# Patient Record
Sex: FEMALE | Race: WHITE | NOT HISPANIC OR LATINO | Employment: OTHER | URBAN - METROPOLITAN AREA
[De-identification: names, ages, dates, MRNs, and addresses within clinical notes are randomized per-mention and may not be internally consistent; named-entity substitution may affect disease eponyms.]

---

## 2019-08-08 ENCOUNTER — OFFICE VISIT (OUTPATIENT)
Dept: FAMILY MEDICINE CLINIC | Facility: CLINIC | Age: 64
End: 2019-08-08

## 2019-08-08 VITALS
TEMPERATURE: 99 F | HEART RATE: 90 BPM | SYSTOLIC BLOOD PRESSURE: 132 MMHG | OXYGEN SATURATION: 92 % | HEIGHT: 66 IN | DIASTOLIC BLOOD PRESSURE: 82 MMHG | BODY MASS INDEX: 25.52 KG/M2 | WEIGHT: 158.8 LBS

## 2019-08-08 DIAGNOSIS — R39.9 URINARY SYMPTOM OR SIGN: ICD-10-CM

## 2019-08-08 DIAGNOSIS — F39 MOOD DISORDER (HCC): ICD-10-CM

## 2019-08-08 DIAGNOSIS — H53.9 VISUAL DISTURBANCES: Primary | ICD-10-CM

## 2019-08-08 DIAGNOSIS — N95.9 POSTMENOPAUSAL SYMPTOMS: ICD-10-CM

## 2019-08-08 DIAGNOSIS — I10 ESSENTIAL HYPERTENSION: ICD-10-CM

## 2019-08-08 DIAGNOSIS — R51.9 WORSENING HEADACHES: ICD-10-CM

## 2019-08-08 DIAGNOSIS — E78.5 HYPERLIPIDEMIA, UNSPECIFIED HYPERLIPIDEMIA TYPE: ICD-10-CM

## 2019-08-08 DIAGNOSIS — M25.561 CHRONIC PAIN OF RIGHT KNEE: ICD-10-CM

## 2019-08-08 DIAGNOSIS — G89.29 CHRONIC PAIN OF RIGHT KNEE: ICD-10-CM

## 2019-08-08 DIAGNOSIS — K21.9 GASTROESOPHAGEAL REFLUX DISEASE WITHOUT ESOPHAGITIS: ICD-10-CM

## 2019-08-08 DIAGNOSIS — G89.4 CHRONIC PAIN SYNDROME: ICD-10-CM

## 2019-08-08 PROBLEM — N39.0 UTI (URINARY TRACT INFECTION): Status: ACTIVE | Noted: 2019-08-08

## 2019-08-08 LAB
BILIRUB BLD-MCNC: NEGATIVE MG/DL
CLARITY, POC: CLEAR
COLOR UR: YELLOW
GLUCOSE UR STRIP-MCNC: NEGATIVE MG/DL
KETONES UR QL: NEGATIVE
LEUKOCYTE EST, POC: NEGATIVE
NITRITE UR-MCNC: NEGATIVE MG/ML
PH UR: 7.5 [PH] (ref 5–8)
PROT UR STRIP-MCNC: NEGATIVE MG/DL
RBC # UR STRIP: NEGATIVE /UL
SP GR UR: 1.01 (ref 1–1.03)
UROBILINOGEN UR QL: NORMAL

## 2019-08-08 PROCEDURE — 81003 URINALYSIS AUTO W/O SCOPE: CPT | Performed by: INTERNAL MEDICINE

## 2019-08-08 PROCEDURE — 99214 OFFICE O/P EST MOD 30 MIN: CPT | Performed by: INTERNAL MEDICINE

## 2019-08-08 RX ORDER — METOPROLOL TARTRATE 100 MG/1
100 TABLET ORAL 2 TIMES DAILY
Qty: 180 TABLET | Refills: 1 | Status: SHIPPED | OUTPATIENT
Start: 2019-08-08 | End: 2020-02-06 | Stop reason: SDUPTHER

## 2019-08-08 RX ORDER — HYDROCODONE BITARTRATE AND ACETAMINOPHEN 7.5; 325 MG/1; MG/1
1 TABLET ORAL EVERY 8 HOURS PRN
Qty: 90 TABLET | Refills: 0 | Status: SHIPPED | OUTPATIENT
Start: 2019-08-08 | End: 2019-09-06 | Stop reason: SDUPTHER

## 2019-08-08 RX ORDER — PAROXETINE HYDROCHLORIDE 40 MG/1
60 TABLET, FILM COATED ORAL EVERY MORNING
Qty: 45 TABLET | Refills: 3 | Status: SHIPPED | OUTPATIENT
Start: 2019-08-08 | End: 2019-11-07

## 2019-08-08 RX ORDER — PANTOPRAZOLE SODIUM 40 MG/1
40 TABLET, DELAYED RELEASE ORAL DAILY
Qty: 90 TABLET | Refills: 1 | Status: SHIPPED | OUTPATIENT
Start: 2019-08-08 | End: 2020-02-06 | Stop reason: SDUPTHER

## 2019-08-08 NOTE — PROGRESS NOTES
Subjective   Lula Decker is a 64 y.o. female.     Chief Complaint   Patient presents with   • Knee Pain     right   • Shoulder Pain   • Osteoarthritis   • Urinary Tract Infection   • Vision Disturbance   • Lung Follow-up       History of Present Illness   Having strong smelling and appearing urine for the last 2 weeks.  No fever, chills, dysuria.  Has had some night sweats that was helped with paroxetine in the past.  Has chronic right knee pain with episodes of exacerbation the most recent 2 months ago that was severe and impeded walking for a week.  Some shoulder pain and shoulder blade pain worse with deep inspiration and carrying the portable oxygen with shoulder strap.  Had evaluation with Dr Falcon and had bronchoscopy finding pseudomonas and treated with cipro.  Better but still with chronic short of breath that is oxygen requiring.  When awakens has loss of vision with white starburst in the middle bilaterally. Has had 4-6 episodes and increasing. No associated dizziness.  Having increased headaches. Saw ophthalmology with Den and no eye issues found.  No new weakness or numbness.  The following portions of the patient's history were reviewed and updated as appropriate: allergies, current medications, past family history, past medical history, past social history, past surgical history and problem list.    Past Medical History:   Diagnosis Date   • Abnormal findings on diagnostic imaging of abdomen    • Alopecia areata    • Asthma    • Bleeding external hemorrhoids    • BMI 25.0-25.9,adult    • Calf cramp    • Carpal tunnel syndrome    • Chest pain    • Chest wall pain    • Constipation    • COPD (chronic obstructive pulmonary disease) (CMS/HCC)    • COPD with acute exacerbation (CMS/HCC)    • Discoloration of skin of foot    • Dyslipidemia    • Dysuria    • Eczema    • Encounter for immunization    • Esophageal reflux    • Fatty liver    • GERD (gastroesophageal reflux disease)    • Headache     • Hives of unknown origin    • Hyperactivity of bladder    • Hypertension    • IBS (irritable bowel syndrome)    • Incontinence of urine    • Insomnia    • Menopausal symptom    • Migraine    • MRSA exposure    • Muscle spasm    • Nocturia    • OA (osteoarthritis)    • Obstructive chronic bronchitis with acute exacerbation (CMS/HCC)    • Oral thrush    • Osteoarthritis of left knee    • Osteoporosis    • PAD (peripheral artery disease) (CMS/HCC)    • Sinusitis, acute    • Synovial cyst of popliteal space    • Tremor    • Tubular adenoma of colon    • Urinary frequency    • Urinary urgency    • Urinary, incontinence, stress female    • UTI (urinary tract infection)        Past Surgical History:   Procedure Laterality Date   • BREAST LUMPECTOMY     • BREAST SURGERY     • COLONOSCOPY     • HYSTERECTOMY      Not due to cancer   • NECK SURGERY     • NEUROPLASTY      Median nerve at carpal tunnel.   • OOPHORECTOMY     • OTHER SURGICAL HISTORY  08/20/2014    Esophadogastroduodenoscopy - Gastritis    • TONSILLECTOMY     • TUBAL ABDOMINAL LIGATION         Family History   Problem Relation Age of Onset   • Heart failure Father        Social History     Socioeconomic History   • Marital status:      Spouse name: Not on file   • Number of children: Not on file   • Years of education: Not on file   • Highest education level: Not on file   Tobacco Use   • Smoking status: Current Every Day Smoker     Packs/day: 0.50     Types: Cigarettes   Substance and Sexual Activity   • Alcohol use: Yes       Review of Systems    Objective   Vitals:    08/08/19 1338   BP: 132/82   Pulse: 90   Temp: 99 °F (37.2 °C)   SpO2: 92%     Body mass index is 25.63 kg/m².  Physical Exam    No results found for this or any previous visit (from the past 2016 hour(s)).  Assessment/Plan   Lula was seen today for knee pain, shoulder pain, osteoarthritis, urinary tract infection, vision disturbance and lung follow-up.    Diagnoses and all orders for  this visit:    Visual disturbances  -     MRI Angiogram Head With & Without Contrast; Future  -     CBC & Differential  -     TSH Rfx On Abnormal To Free T4    Postmenopausal symptoms  -     PARoxetine (PAXIL) 40 MG tablet; Take 1.5 tablets by mouth Every Morning.  -     CBC & Differential  -     TSH Rfx On Abnormal To Free T4    Chronic pain of right knee  -     Ambulatory Referral to Orthopedic Surgery    Chronic pain syndrome    Gastroesophageal reflux disease without esophagitis  -     pantoprazole (PROTONIX) 40 MG EC tablet; Take 1 tablet by mouth Daily.    Essential hypertension  -     metoprolol tartrate (LOPRESSOR) 100 MG tablet; Take 1 tablet by mouth 2 (Two) Times a Day.    Worsening headaches  -     MRI Angiogram Head With & Without Contrast; Future    Hyperlipidemia, unspecified hyperlipidemia type  -     Comprehensive Metabolic Panel  -     Lipid Panel    Mood disorder (CMS/HCC)   -     TSH Rfx On Abnormal To Free T4    Other orders  -     HYDROcodone-acetaminophen (NORCO) 7.5-325 MG per tablet; Take 1 tablet by mouth Every 8 (Eight) Hours As Needed for Moderate Pain .      Controlled substance agreement reviewed and signed in office.  ROGER run and reviewed.  Risks of the medication include but are not limited to fatigue, somnolence, increased risk of falls, constipation, allergic reaction, dependence, and addiction

## 2019-08-09 LAB
ALBUMIN SERPL-MCNC: 4.3 G/DL (ref 3.6–4.8)
ALBUMIN/GLOB SERPL: 1.7 {RATIO} (ref 1.2–2.2)
ALP SERPL-CCNC: 69 IU/L (ref 39–117)
ALT SERPL-CCNC: 12 IU/L (ref 0–32)
AST SERPL-CCNC: 23 IU/L (ref 0–40)
BASOPHILS # BLD AUTO: 0 X10E3/UL (ref 0–0.2)
BASOPHILS NFR BLD AUTO: 0 %
BILIRUB SERPL-MCNC: 0.2 MG/DL (ref 0–1.2)
BUN SERPL-MCNC: 7 MG/DL (ref 8–27)
BUN/CREAT SERPL: 12 (ref 12–28)
CALCIUM SERPL-MCNC: 9.1 MG/DL (ref 8.7–10.3)
CHLORIDE SERPL-SCNC: 93 MMOL/L (ref 96–106)
CHOLEST SERPL-MCNC: 257 MG/DL (ref 100–199)
CO2 SERPL-SCNC: 31 MMOL/L (ref 20–29)
CREAT SERPL-MCNC: 0.6 MG/DL (ref 0.57–1)
EOSINOPHIL # BLD AUTO: 0.1 X10E3/UL (ref 0–0.4)
EOSINOPHIL NFR BLD AUTO: 1 %
ERYTHROCYTE [DISTWIDTH] IN BLOOD BY AUTOMATED COUNT: 13.2 % (ref 12.3–15.4)
GLOBULIN SER CALC-MCNC: 2.6 G/DL (ref 1.5–4.5)
GLUCOSE SERPL-MCNC: 77 MG/DL (ref 65–99)
HCT VFR BLD AUTO: 35.8 % (ref 34–46.6)
HDLC SERPL-MCNC: 68 MG/DL
HGB BLD-MCNC: 11.6 G/DL (ref 11.1–15.9)
IMM GRANULOCYTES # BLD AUTO: 0 X10E3/UL (ref 0–0.1)
IMM GRANULOCYTES NFR BLD AUTO: 0 %
LDLC SERPL CALC-MCNC: 158 MG/DL (ref 0–99)
LYMPHOCYTES # BLD AUTO: 3.8 X10E3/UL (ref 0.7–3.1)
LYMPHOCYTES NFR BLD AUTO: 42 %
MCH RBC QN AUTO: 30 PG (ref 26.6–33)
MCHC RBC AUTO-ENTMCNC: 32.4 G/DL (ref 31.5–35.7)
MCV RBC AUTO: 93 FL (ref 79–97)
MONOCYTES # BLD AUTO: 0.9 X10E3/UL (ref 0.1–0.9)
MONOCYTES NFR BLD AUTO: 10 %
NEUTROPHILS # BLD AUTO: 4.1 X10E3/UL (ref 1.4–7)
NEUTROPHILS NFR BLD AUTO: 47 %
PLATELET # BLD AUTO: 325 X10E3/UL (ref 150–450)
POTASSIUM SERPL-SCNC: 4.3 MMOL/L (ref 3.5–5.2)
PROT SERPL-MCNC: 6.9 G/DL (ref 6–8.5)
RBC # BLD AUTO: 3.87 X10E6/UL (ref 3.77–5.28)
SODIUM SERPL-SCNC: 139 MMOL/L (ref 134–144)
TRIGL SERPL-MCNC: 156 MG/DL (ref 0–149)
TSH SERPL DL<=0.005 MIU/L-ACNC: 1.3 UIU/ML (ref 0.45–4.5)
VLDLC SERPL CALC-MCNC: 31 MG/DL (ref 5–40)
WBC # BLD AUTO: 8.9 X10E3/UL (ref 3.4–10.8)

## 2019-08-14 LAB — DRUGS UR: NORMAL

## 2019-09-04 ENCOUNTER — OFFICE VISIT (OUTPATIENT)
Dept: ORTHOPEDIC SURGERY | Facility: CLINIC | Age: 64
End: 2019-09-04

## 2019-09-04 VITALS — WEIGHT: 158 LBS | TEMPERATURE: 97.3 F | HEIGHT: 66 IN | BODY MASS INDEX: 25.39 KG/M2

## 2019-09-04 DIAGNOSIS — M17.11 ARTHRITIS OF RIGHT KNEE: ICD-10-CM

## 2019-09-04 DIAGNOSIS — M25.561 CHRONIC PAIN OF RIGHT KNEE: Primary | ICD-10-CM

## 2019-09-04 DIAGNOSIS — G89.29 CHRONIC PAIN OF RIGHT KNEE: Primary | ICD-10-CM

## 2019-09-04 PROCEDURE — 99203 OFFICE O/P NEW LOW 30 MIN: CPT | Performed by: ORTHOPAEDIC SURGERY

## 2019-09-04 PROCEDURE — 73562 X-RAY EXAM OF KNEE 3: CPT | Performed by: ORTHOPAEDIC SURGERY

## 2019-09-04 NOTE — PATIENT INSTRUCTIONS
Knee Exercises    Ask your health care provider which exercises are safe for you. Do exercises exactly as told by your health care provider and adjust them as directed. It is normal to feel mild stretching, pulling, tightness, or discomfort as you do these exercises, but you should stop right away if you feel sudden pain or your pain gets worse. Do not begin these exercises until told by your health care provider.  STRETCHING AND RANGE OF MOTION EXERCISES  These exercises warm up your muscles and joints and improve the movement and flexibility of your knee. These exercises also help to relieve pain, numbness, and tingling.  Exercise A: Knee Extension, Prone  1. Lie on your abdomen on a bed.  2. Place your left / right knee just beyond the edge of the surface so your knee is not on the bed. You can put a towel under your left / right thigh just above your knee for comfort.  3. Relax your leg muscles and allow gravity to straighten your knee. You should feel a stretch behind your left / right knee.  4. Hold this position for __________ seconds.  5. Scoot up so your knee is supported between repetitions.  Repeat __________ times. Complete this stretch __________ times a day.  Exercise B: Knee Flexion, Active  1. Lie on your back with both knees straight. If this causes back discomfort, bend your left / right knee so your foot is flat on the floor.  2. Slowly slide your left / right heel back toward your buttocks until you feel a gentle stretch in the front of your knee or thigh.  3. Hold this position for __________ seconds.  4. Slowly slide your left / right heel back to the starting position.  Repeat __________ times. Complete this exercise __________ times a day.  Exercise C: Quadriceps, Prone  1. Lie on your abdomen on a firm surface, such as a bed or padded floor.  2. Bend your left / right knee and hold your ankle. If you cannot reach your ankle or pant leg, loop a belt around your foot and grab the belt  instead.  3. Gently pull your heel toward your buttocks. Your knee should not slide out to the side. You should feel a stretch in the front of your thigh and knee.  4. Hold this position for __________ seconds.  Repeat __________ times. Complete this stretch __________ times a day.  Exercise D: Hamstring, Supine  1. Lie on your back.  2. Loop a belt or towel over the ball of your left / right foot. The ball of your foot is on the walking surface, right under your toes.  3. Straighten your left / right knee and slowly pull on the belt to raise your leg until you feel a gentle stretch behind your knee.  ? Do not let your left / right knee bend while you do this.  ? Keep your other leg flat on the floor.  4. Hold this position for __________ seconds.  Repeat __________ times. Complete this stretch __________ times a day.  STRENGTHENING EXERCISES  These exercises build strength and endurance in your knee. Endurance is the ability to use your muscles for a long time, even after they get tired.  Exercise E: Quadriceps, Isometric  1. Lie on your back with your left / right leg extended and your other knee bent. Put a rolled towel or small pillow under your knee if told by your health care provider.  2. Slowly tense the muscles in the front of your left / right thigh. You should see your kneecap slide up toward your hip or see increased dimpling just above the knee. This motion will push the back of the knee toward the floor.  3. For __________ seconds, keep the muscle as tight as you can without increasing your pain.  4. Relax the muscles slowly and completely.  Repeat __________ times. Complete this exercise __________ times a day.  Exercise F: Straight Leg Raises - Quadriceps  1. Lie on your back with your left / right leg extended and your other knee bent.  2. Tense the muscles in the front of your left / right thigh. You should see your kneecap slide up or see increased dimpling just above the knee. Your thigh may even  shake a bit.  3. Keep these muscles tight as you raise your leg 4-6 inches (10-15 cm) off the floor. Do not let your knee bend.  4. Hold this position for __________ seconds.  5. Keep these muscles tense as you lower your leg.  6. Relax your muscles slowly and completely after each repetition.  Repeat __________ times. Complete this exercise __________ times a day.  Exercise G: Hamstring, Isometric  1. Lie on your back on a firm surface.  2. Bend your left / right knee approximately __________ degrees.  3. Dig your left / right heel into the surface as if you are trying to pull it toward your buttocks. Tighten the muscles in the back of your thighs to dig as hard as you can without increasing any pain.  4. Hold this position for __________ seconds.  5. Release the tension gradually and allow your muscles to relax completely for __________ seconds after each repetition.  Repeat __________ times. Complete this exercise __________ times a day.  Exercise H: Hamstring Curls  If told by your health care provider, do this exercise while wearing ankle weights. Begin with __________ weights. Then increase the weight by 1 lb (0.5 kg) increments. Do not wear ankle weights that are more than __________.  1. Lie on your abdomen with your legs straight.  2. Bend your left / right knee as far as you can without feeling pain. Keep your hips flat against the floor.  3. Hold this position for __________ seconds.  4. Slowly lower your leg to the starting position.  Repeat __________ times. Complete this exercise __________ times a day.  Exercise I: Squats (Quadriceps)  1.  front of a table, with your feet and knees pointing straight ahead. You may rest your hands on the table for balance but not for support.  2. Slowly bend your knees and lower your hips like you are going to sit in a chair.  ? Keep your weight over your heels, not over your toes.  ? Keep your lower legs upright so they are parallel with the table legs.  ? Do  not let your hips go lower than your knees.  ? Do not bend lower than told by your health care provider.  ? If your knee pain increases, do not bend as low.  3. Hold the squat position for __________ seconds.  4. Slowly push with your legs to return to standing. Do not use your hands to pull yourself to standing.  Repeat __________ times. Complete this exercise __________ times a day.  Exercise J: Wall Slides (Quadriceps)  1. Lean your back against a smooth wall or door while you walk your feet out 18-24 inches (46-61 cm) from it.  2. Place your feet hip-width apart.  3. Slowly slide down the wall or door until your knees bend __________ degrees. Keep your knees over your heels, not over your toes. Keep your knees in line with your hips.  4. Hold for __________ seconds.  Repeat __________ times. Complete this exercise __________ times a day.  Exercise K: Straight Leg Raises - Hip Abductors  1. Lie on your side with your left / right leg in the top position. Lie so your head, shoulder, knee, and hip line up. You may bend your bottom knee to help you keep your balance.  2. Roll your hips slightly forward so your hips are stacked directly over each other and your left / right knee is facing forward.  3. Leading with your heel, lift your top leg 4-6 inches (10-15 cm). You should feel the muscles in your outer hip lifting.  ? Do not let your foot drift forward.  ? Do not let your knee roll toward the ceiling.  4. Hold this position for __________ seconds.  5. Slowly return your leg to the starting position.  6. Let your muscles relax completely after each repetition.  Repeat __________ times. Complete this exercise __________ times a day.  Exercise L: Straight Leg Raises - Hip Extensors  1. Lie on your abdomen on a firm surface. You can put a pillow under your hips if that is more comfortable.  2. Tense the muscles in your buttocks and lift your left / right leg about 4-6 inches (10-15 cm). Keep your knee straight as you  lift your leg.  3. Hold this position for __________ seconds.  4. Slowly lower your leg to the starting position.  5. Let your leg relax completely after each repetition.  Repeat __________ times. Complete this exercise __________ times a day.  This information is not intended to replace advice given to you by your health care provider. Make sure you discuss any questions you have with your health care provider.  Document Released: 11/01/2006 Document Revised: 09/11/2017 Document Reviewed: 10/23/2016  [employer]vier Interactive Patient Education © 2019 [employer]

## 2019-09-04 NOTE — PROGRESS NOTES
"Patient Name: Lula Decker   YOB: 1955  Referring Primary Care Physician: Nghia Eaton MD  BMI: Body mass index is 25.5 kg/m².    Chief Complaint:    Chief Complaint   Patient presents with   • Right Knee - Establish Care        HPI:     Lula Decker is a 64 y.o. female who presents today for evaluation of   Chief Complaint   Patient presents with   • Right Knee - Establish Care   .  Patient is seen here today complaining of right knee pain.  She says that is been going on for a few years.  She said her last few months and last few weeks she had a severe exacerbation without trauma that was achy and painful.  It was relieved by BenGay stick on pads and rest.  He is on oxygen and has \"25% lung    This problem is new to this examiner.     Subjective   Medications:   Home Medications:  Current Outpatient Medications on File Prior to Visit   Medication Sig   • albuterol sulfate  (90 Base) MCG/ACT inhaler Inhale 2 puffs Every 4 (Four) Hours As Needed for Wheezing.   • amitriptyline (ELAVIL) 100 MG tablet Take 100 mg by mouth Every Night.   • HYDROcodone-acetaminophen (NORCO) 7.5-325 MG per tablet Take 1 tablet by mouth Every 8 (Eight) Hours As Needed for Moderate Pain .   • hydrocortisone 2.5 % cream Apply 2.5 application topically to the appropriate area as directed 3 (Three) Times a Day.   • IPRATROPIUM-ALBUTEROL IN Inhale.   • metoprolol tartrate (LOPRESSOR) 100 MG tablet Take 1 tablet by mouth 2 (Two) Times a Day.   • mometasone-formoterol (DULERA 200) 200-5 MCG/ACT inhaler Inhale 2 puffs 2 (Two) Times a Day.   • naproxen sodium (ALEVE) 220 MG tablet Take 220 mg by mouth 2 (Two) Times a Day As Needed.   • pantoprazole (PROTONIX) 40 MG EC tablet Take 1 tablet by mouth Daily.   • triamcinolone (KENALOG) 0.1 % cream Apply  topically to the appropriate area as directed 2 (Two) Times a Day.   • PARoxetine (PAXIL) 40 MG tablet Take 1.5 tablets by mouth Every Morning.   • simvastatin (ZOCOR) 40 " MG tablet Take 40 mg by mouth Every Night.     No current facility-administered medications on file prior to visit.      Current Medications:  Scheduled Meds:  Continuous Infusions:  No current facility-administered medications for this visit.   PRN Meds:.    I have reviewed the patient's medical history in detail and updated the computerized patient record.  Review and summarization of old records includes:    Past Medical History:   Diagnosis Date   • Abnormal findings on diagnostic imaging of abdomen    • Alopecia areata    • Asthma    • Bleeding external hemorrhoids    • BMI 25.0-25.9,adult    • Calf cramp    • Carpal tunnel syndrome    • Chest pain    • Chest wall pain    • Constipation    • COPD (chronic obstructive pulmonary disease) (CMS/[facility])    • COPD with acute exacerbation (CMS/[facility])    • Discoloration of skin of foot    • Dyslipidemia    • Dysuria    • Eczema    • Encounter for immunization    • Esophageal reflux    • Fatty liver    • GERD (gastroesophageal reflux disease)    • Headache    • Hives of unknown origin    • Hyperactivity of bladder    • Hypertension    • IBS (irritable bowel syndrome)    • Incontinence of urine    • Insomnia    • Menopausal symptom    • Migraine    • MRSA exposure    • Muscle spasm    • Nocturia    • OA (osteoarthritis)    • Obstructive chronic bronchitis with acute exacerbation (CMS/[facility])    • Oral thrush    • Osteoarthritis of left knee    • Osteoporosis    • PAD (peripheral artery disease) (CMS/[facility])    • Sinusitis, acute    • Synovial cyst of popliteal space    • Tremor    • Tubular adenoma of colon    • Urinary frequency    • Urinary urgency    • Urinary, incontinence, stress female    • UTI (urinary tract infection)         Past Surgical History:   Procedure Laterality Date   • BREAST LUMPECTOMY     • BREAST SURGERY     • COLONOSCOPY     • HYSTERECTOMY      Not due to cancer   • NECK SURGERY     • NEUROPLASTY      Median nerve at carpal tunnel.   • OOPHORECTOMY     •  "OTHER SURGICAL HISTORY  08/20/2014    Esophadogastroduodenoscopy - Gastritis    • TONSILLECTOMY     • TUBAL ABDOMINAL LIGATION          Social History     Occupational History   • Not on file   Tobacco Use   • Smoking status: Current Every Day Smoker     Packs/day: 0.50     Types: Cigarettes   Substance and Sexual Activity   • Alcohol use: Yes   • Drug use: Not on file   • Sexual activity: Defer      Social History     Social History Narrative   • Not on file        Family History   Problem Relation Age of Onset   • Heart failure Father        ROS: 14 point review of systems was performed and all other systems were reviewed and are negative except for documented findings in HPI and today's encounter.     Allergies: No Known Allergies  Constitutional:  Denies fever, shaking or chills   Eyes:  Denies change in visual acuity   HENT:  Denies nasal congestion or sore throat   Respiratory:  Denies cough or shortness of breath   Cardiovascular:  Denies chest pain or severe LE edema   GI:  Denies abdominal pain, nausea, vomiting, bloody stools or diarrhea   Musculoskeletal:  Numbness, tingling, pain, or loss of motor function only as noted above in history of present illness.  : Denies painful urination or hematuria  Integument:  Denies rash, lesion or ulceration   Neurologic:  Denies headache or focal weakness  Endocrine:  Denies lymphadenopathy  Psych:  Denies confusion or change in mental status   Hem:  Denies active bleeding    OBJECTIVE:  Physical Exam: 64 y.o. female  Wt Readings from Last 3 Encounters:   09/04/19 71.7 kg (158 lb)   08/08/19 72 kg (158 lb 12.8 oz)     Ht Readings from Last 1 Encounters:   09/04/19 167.6 cm (66\")     Body mass index is 25.5 kg/m².  Vitals:    09/04/19 1301   Temp: 97.3 °F (36.3 °C)     Vital signs reviewed.     General Appearance:    Alert, cooperative, in no acute distress                  Eyes: conjunctiva clear  ENT: external ears and nose atraumatic  CV: no peripheral " edema  Resp: normal respiratory effort  Skin: no rashes or wounds; normal turgor  Psych: mood and affect appropriate  Lymph: no nodes appreciated  Neuro: gross sensation intact  Vascular:  Palpable peripheral pulse in noted extremity  Musculoskeletal Extremities: Exam today shows range of motion goes about -5 to about 100 she has some effusion synovitis and diffuse joint line tenderness her calf is soft and she can walk with a slight limp    Radiology:   AP lateral 40 degree PA x-ray right knee taken the office today without comparison view show advanced arthritis best seen on the lateral    Assessment:     ICD-10-CM ICD-9-CM   1. Chronic pain of right knee M25.561 719.46    G89.29 338.29   2. Arthritis of right knee M17.11 716.96        Procedures       Plan: Biomechanics of pertinent body area discussed.  Risks, benefits, alternatives, comparisons, and complications of accepted medicines, injections, recommendations, surgical procedures, and therapies explained and education provided in laymen's terms. Natural history and expected course of this patient's diagnosis discussed along with evaluation of therapies. Questions answered. When appropriate I also discussed proper use of cane, walker, trekking poles.   BMI:  The concept of BMI body mass index and its importance and implications discussed.  BMI suggested to be < 40 or as low as possible. Lifestyle measures for weight loss and how this affects orthopedic condition.  EXERCISES:  Advice on benefits of, and types of regular/moderate exercise including biomechanical forces involved as it pertains to this complaint.  MEDICATIONS:  Prescription, OTC and Monitoring of Medications per orders to address ortho complaints; Evaluation and discussion of safety, precautions, side effects, and warnings given especially of long term NSAID or steroid therapy.    RICE: Rest, ice, compression, and elevation therapy, Cryotherapy/brachy therapy, and or OTC linaments as indicated  with instructions.   PT referral offered and declined.offered physical therapy she wanted to handout I explained it.  Offered an injection but since is doing better she wants to watch it.  Is taking Aleve denies any contraindications and went over dosing of Aleve as well as Tylenol depending on the symptoms.  If she is having trouble she could be brought back to the nurse practitioner for cortisone injection      9/4/2019    Much of this encounter note is an electronic transcription/translation of spoken language to printed text. The electronic translation of spoken language may permit erroneous, or at times, nonsensical words or phrases to be inadvertently transcribed; Although I have reviewed the note for such errors, some may still exist

## 2019-09-09 RX ORDER — HYDROCODONE BITARTRATE AND ACETAMINOPHEN 7.5; 325 MG/1; MG/1
1 TABLET ORAL EVERY 8 HOURS PRN
Qty: 90 TABLET | Refills: 0 | Status: SHIPPED | OUTPATIENT
Start: 2019-09-09 | End: 2019-10-06 | Stop reason: SDUPTHER

## 2019-10-07 RX ORDER — HYDROCODONE BITARTRATE AND ACETAMINOPHEN 7.5; 325 MG/1; MG/1
1 TABLET ORAL EVERY 8 HOURS PRN
Qty: 90 TABLET | Refills: 0 | Status: SHIPPED | OUTPATIENT
Start: 2019-10-07 | End: 2019-11-07 | Stop reason: SDUPTHER

## 2019-10-08 ENCOUNTER — TELEPHONE (OUTPATIENT)
Dept: FAMILY MEDICINE CLINIC | Facility: CLINIC | Age: 64
End: 2019-10-08

## 2019-10-28 DIAGNOSIS — H53.9 VISUAL DISTURBANCES: ICD-10-CM

## 2019-10-28 DIAGNOSIS — R51.9 WORSENING HEADACHES: ICD-10-CM

## 2019-11-07 ENCOUNTER — OFFICE VISIT (OUTPATIENT)
Dept: FAMILY MEDICINE CLINIC | Facility: CLINIC | Age: 64
End: 2019-11-07

## 2019-11-07 VITALS
DIASTOLIC BLOOD PRESSURE: 82 MMHG | TEMPERATURE: 98 F | OXYGEN SATURATION: 96 % | HEIGHT: 66 IN | WEIGHT: 163 LBS | BODY MASS INDEX: 26.2 KG/M2 | SYSTOLIC BLOOD PRESSURE: 130 MMHG | HEART RATE: 92 BPM

## 2019-11-07 DIAGNOSIS — G89.4 CHRONIC PAIN SYNDROME: Primary | ICD-10-CM

## 2019-11-07 DIAGNOSIS — M15.9 PRIMARY OSTEOARTHRITIS INVOLVING MULTIPLE JOINTS: ICD-10-CM

## 2019-11-07 DIAGNOSIS — M25.472 EDEMA OF LEFT ANKLE: ICD-10-CM

## 2019-11-07 DIAGNOSIS — G89.4 CHRONIC PAIN SYNDROME: ICD-10-CM

## 2019-11-07 DIAGNOSIS — E66.3 OVERWEIGHT (BMI 25.0-29.9): ICD-10-CM

## 2019-11-07 PROBLEM — M19.90 OA (OSTEOARTHRITIS): Status: ACTIVE | Noted: 2019-11-07

## 2019-11-07 PROCEDURE — 99213 OFFICE O/P EST LOW 20 MIN: CPT | Performed by: INTERNAL MEDICINE

## 2019-11-07 PROCEDURE — 90471 IMMUNIZATION ADMIN: CPT | Performed by: INTERNAL MEDICINE

## 2019-11-07 PROCEDURE — 90686 IIV4 VACC NO PRSV 0.5 ML IM: CPT | Performed by: INTERNAL MEDICINE

## 2019-11-07 RX ORDER — HYDROCODONE BITARTRATE AND ACETAMINOPHEN 7.5; 325 MG/1; MG/1
1 TABLET ORAL EVERY 8 HOURS PRN
Qty: 90 TABLET | Refills: 0 | Status: SHIPPED | OUTPATIENT
Start: 2019-11-07 | End: 2019-12-03 | Stop reason: SDUPTHER

## 2019-11-07 RX ORDER — HYDROCODONE BITARTRATE AND ACETAMINOPHEN 7.5; 325 MG/1; MG/1
1 TABLET ORAL EVERY 8 HOURS PRN
Qty: 90 TABLET | Refills: 0 | Status: SHIPPED | OUTPATIENT
Start: 2019-11-07 | End: 2019-11-07 | Stop reason: SDUPTHER

## 2019-11-07 RX ORDER — AMOXICILLIN 250 MG
3 CAPSULE ORAL DAILY
COMMUNITY
End: 2020-05-07 | Stop reason: SDUPTHER

## 2019-11-07 NOTE — PROGRESS NOTES
Subjective   Lula Decker is a 64 y.o. female.     Chief Complaint   Patient presents with   • Pain   • Joint Swelling       History of Present Illness   Here for follow up with no headaches or dizziness or vision issues.  MRA reviewed.  Patient continues with the right knee pain and saw Dr Smart and discussed the severe OA and recommended injections at this time but patient refuses.  Left ankle swells some but no pain.  Continues to have chronic pain in the back, shoulders and knee.  Using the hydrocodone as needed and averages 2-3 times per day.  Denies side effects or issues with the problems.    The following portions of the patient's history were reviewed and updated as appropriate: allergies, current medications, past family history, past medical history, past social history, past surgical history and problem list.    Past Medical History:   Diagnosis Date   • Abnormal findings on diagnostic imaging of abdomen    • Alopecia areata    • Asthma    • Bleeding external hemorrhoids    • BMI 25.0-25.9,adult    • Calf cramp    • Carpal tunnel syndrome    • Chest pain    • Chest wall pain    • Constipation    • COPD (chronic obstructive pulmonary disease) (CMS/HCC)    • COPD with acute exacerbation (CMS/HCC)    • Discoloration of skin of foot    • Dyslipidemia    • Dysuria    • Eczema    • Encounter for immunization    • Esophageal reflux    • Fatty liver    • GERD (gastroesophageal reflux disease)    • Headache    • Hives of unknown origin    • Hyperactivity of bladder    • Hypertension    • IBS (irritable bowel syndrome)    • Incontinence of urine    • Insomnia    • Menopausal symptom    • Migraine    • MRSA exposure    • Muscle spasm    • Nocturia    • OA (osteoarthritis)    • Obstructive chronic bronchitis with acute exacerbation (CMS/HCC)    • Oral thrush    • Osteoarthritis of left knee    • Osteoporosis    • PAD (peripheral artery disease) (CMS/HCC)    • Sinusitis, acute    • Synovial cyst of popliteal space     • Tremor    • Tubular adenoma of colon    • Urinary frequency    • Urinary urgency    • Urinary, incontinence, stress female    • UTI (urinary tract infection)        Past Surgical History:   Procedure Laterality Date   • BREAST LUMPECTOMY     • BREAST SURGERY     • COLONOSCOPY     • HYSTERECTOMY      Not due to cancer   • NECK SURGERY     • NEUROPLASTY      Median nerve at carpal tunnel.   • OOPHORECTOMY     • OTHER SURGICAL HISTORY  08/20/2014    Esophadogastroduodenoscopy - Gastritis    • TONSILLECTOMY     • TUBAL ABDOMINAL LIGATION         Family History   Problem Relation Age of Onset   • Heart failure Father        Social History     Socioeconomic History   • Marital status:      Spouse name: Not on file   • Number of children: Not on file   • Years of education: Not on file   • Highest education level: Not on file   Tobacco Use   • Smoking status: Current Every Day Smoker     Packs/day: 0.50     Types: Cigarettes   • Smokeless tobacco: Never Used   Substance and Sexual Activity   • Alcohol use: Yes   • Drug use: No   • Sexual activity: Defer       Review of Systems   Constitutional: Negative for activity change, appetite change, fatigue, fever, unexpected weight gain and unexpected weight loss.   HENT: Negative for nosebleeds, rhinorrhea, trouble swallowing and voice change.    Eyes: Negative for visual disturbance.   Respiratory: Negative for cough, chest tightness, shortness of breath and wheezing.    Cardiovascular: Negative for chest pain, palpitations and leg swelling.   Gastrointestinal: Negative for abdominal pain, blood in stool, constipation, diarrhea, nausea, vomiting, GERD and indigestion.   Genitourinary: Negative for dysuria, frequency and hematuria.   Musculoskeletal: Negative for arthralgias, back pain and myalgias.   Skin: Negative for rash and bruise.   Neurological: Negative for dizziness, tremors, weakness, light-headedness, numbness, headache and memory problem.   Hematological:  Negative for adenopathy. Does not bruise/bleed easily.   Psychiatric/Behavioral: Negative for sleep disturbance and depressed mood. The patient is not nervous/anxious.        Objective   Vitals:    11/07/19 1309   BP: 130/82   Pulse: 92   Temp: 98 °F (36.7 °C)   SpO2: 96%     Body mass index is 26.32 kg/m².  Physical Exam   Constitutional: She is oriented to person, place, and time. She appears well-developed and well-nourished. No distress.   HENT:   Head: Normocephalic and atraumatic.   Right Ear: External ear normal.   Left Ear: External ear normal.   Nose: Nose normal.   Mouth/Throat: Oropharynx is clear and moist.   Eyes: Conjunctivae and EOM are normal. Pupils are equal, round, and reactive to light.   Neck: Normal range of motion. Neck supple. No tracheal deviation present. No thyromegaly present.   Cardiovascular: Normal rate, regular rhythm, normal heart sounds and intact distal pulses. Exam reveals no gallop and no friction rub.   No murmur heard.  Pulmonary/Chest: Effort normal and breath sounds normal. No respiratory distress.   Abdominal: Soft. Bowel sounds are normal. She exhibits no mass. There is no tenderness. There is no guarding.   Musculoskeletal: Normal range of motion. She exhibits no edema.   Lymphadenopathy:     She has no cervical adenopathy.   Neurological: She is alert and oriented to person, place, and time. She displays normal reflexes. She exhibits normal muscle tone.   Skin: Skin is warm and dry. Capillary refill takes less than 2 seconds. No rash noted. She is not diaphoretic.   Psychiatric: She has a normal mood and affect. Her behavior is normal. Judgment and thought content normal.   Nursing note and vitals reviewed.      No results found for this or any previous visit (from the past 2016 hour(s)).  Assessment/Plan   Lula was seen today for pain and joint swelling.    Diagnoses and all orders for this visit:    Chronic pain syndrome    Primary osteoarthritis involving multiple  joints    Edema of left ankle    Overweight (BMI 25.0-29.9)    Other orders  -     Fluarix/Fluzone/Afluria Quad/FluLaval Quad    Will continue the current medications.  Encouraged to elevate the left leg and compression stockings.  Patient does not want injections or surgery.  ROGER run and reviewed.  Risks of the medication include but are not limited to fatigue, somnolence, increased risk of falls, constipation, allergic reaction, dependence, and addiction

## 2019-11-07 NOTE — PATIENT INSTRUCTIONS
MyPlate from USDA    MyPlate is an outline of a general healthy diet based on the 2010 Dietary Guidelines for Americans, from the U.S. Department of Agriculture (USDA). It sets guidelines for how much food you should eat from each food group based on your age, sex, and level of physical activity.  What are tips for following MyPlate?  To follow MyPlate recommendations:  · Eat a wide variety of fruits and vegetables, grains, and protein foods.  · Serve smaller portions and eat less food throughout the day.  · Limit portion sizes to avoid overeating.  · Enjoy your food.  · Get at least 150 minutes of exercise every week. This is about 30 minutes each day, 5 or more days per week.  It can be difficult to have every meal look like MyPlate. Think about MyPlate as eating guidelines for an entire day, rather than each individual meal.  Fruits and vegetables  · Make half of your plate fruits and vegetables.  · Eat many different colors of fruits and vegetables each day.  · For a 2,000 calorie daily food plan, eat:  ? 2½ cups of vegetables every day.  ? 2 cups of fruit every day.  · 1 cup is equal to:  ? 1 cup raw or cooked vegetables.  ? 1 cup raw fruit.  ? 1 medium-sized orange, apple, or banana.  ? 1 cup 100% fruit or vegetable juice.  ? 2 cups raw leafy greens, such as lettuce, spinach, or kale.  ? ½ cup dried fruit.  Grains  · One fourth of your plate should be grains.  · Make at least half of the grains you eat each day whole grains.  · For a 2,000 calorie daily food plan, eat 6 oz of grains every day.  · 1 oz is equal to:  ? 1 slice bread.  ? 1 cup cereal.  ? ½ cup cooked rice, cereal, or pasta.  Protein  · One fourth of your plate should be protein.  · Eat a wide variety of protein foods, including meat, poultry, fish, eggs, beans, nuts, and tofu.  · For a 2,000 calorie daily food plan, eat 5½ oz of protein every day.  · 1 oz is equal to:  ? 1 oz meat, poultry, or fish.  ? ¼ cup cooked beans.  ? 1 egg.  ? ½ oz  nuts or seeds.  ? 1 Tbsp peanut butter.  Dairy  · Drink fat-free or low-fat (1%) milk.  · Eat or drink dairy as a side to meals.  · For a 2,000 calorie daily food plan, eat or drink 3 cups of dairy every day.  · 1 cup is equal to:  ? 1 cup milk, yogurt, cottage cheese, or soy milk (soy beverage).  ? 2 oz processed cheese.  ? 1½ oz natural cheese.  Fats, oils, salt, and sugars  · Only small amounts of oils are recommended.  · Avoid foods that are high in calories and low in nutritional value (empty calories), like foods high in fat or added sugars.  · Choose foods that are low in salt (sodium). Choose foods that have less than 140 milligrams (mg) of sodium per serving.  · Drink water instead of sugary drinks. Drink enough water each day to keep your urine pale yellow.  Where to find support  · Work with your health care provider or a nutrition specialist (dietitian) to develop a customized eating plan that is right for you.  · Download an gisselle (mobile application) to help you track your daily food intake.  Where to find more information  · Go to ChooseMyPlate.gov for more information.  · Learn more and log your daily food intake according to MyPlate using USDA's [employer]: www.AlleyWatchcker.usda.gov  Summary  · MyPlate is a general guideline for healthy eating from the USDA. It is based on the 2010 Dietary Guidelines for Americans.  · In general, fruits and vegetables should take up ½ of your plate, grains should take up ¼ of your plate, and protein should take up ¼ of your plate.  This information is not intended to replace advice given to you by your health care provider. Make sure you discuss any questions you have with your health care provider.  Document Released: 01/06/2009 Document Revised: 03/19/2018 Document Reviewed: 03/19/2018  [employer] Interactive Patient Education © 2019 Else[employer] Inc.

## 2019-12-03 DIAGNOSIS — M15.9 PRIMARY OSTEOARTHRITIS INVOLVING MULTIPLE JOINTS: ICD-10-CM

## 2019-12-03 DIAGNOSIS — G89.4 CHRONIC PAIN SYNDROME: ICD-10-CM

## 2019-12-05 RX ORDER — HYDROCODONE BITARTRATE AND ACETAMINOPHEN 7.5; 325 MG/1; MG/1
1 TABLET ORAL EVERY 8 HOURS PRN
Qty: 90 TABLET | Refills: 0 | Status: SHIPPED | OUTPATIENT
Start: 2019-12-05 | End: 2020-01-05 | Stop reason: SDUPTHER

## 2019-12-05 RX ORDER — AMITRIPTYLINE HYDROCHLORIDE 100 MG/1
100 TABLET, FILM COATED ORAL NIGHTLY
Qty: 90 TABLET | Refills: 1 | Status: SHIPPED | OUTPATIENT
Start: 2019-12-05 | End: 2020-03-30

## 2020-01-05 DIAGNOSIS — G89.4 CHRONIC PAIN SYNDROME: ICD-10-CM

## 2020-01-05 DIAGNOSIS — M15.9 PRIMARY OSTEOARTHRITIS INVOLVING MULTIPLE JOINTS: ICD-10-CM

## 2020-01-06 RX ORDER — HYDROCODONE BITARTRATE AND ACETAMINOPHEN 7.5; 325 MG/1; MG/1
1 TABLET ORAL EVERY 8 HOURS PRN
Qty: 90 TABLET | Refills: 0 | Status: SHIPPED | OUTPATIENT
Start: 2020-01-06 | End: 2020-02-06 | Stop reason: SDUPTHER

## 2020-02-06 ENCOUNTER — OFFICE VISIT (OUTPATIENT)
Dept: FAMILY MEDICINE CLINIC | Facility: CLINIC | Age: 65
End: 2020-02-06

## 2020-02-06 VITALS
HEIGHT: 66 IN | BODY MASS INDEX: 26.2 KG/M2 | DIASTOLIC BLOOD PRESSURE: 86 MMHG | HEART RATE: 81 BPM | WEIGHT: 163 LBS | OXYGEN SATURATION: 95 % | SYSTOLIC BLOOD PRESSURE: 138 MMHG

## 2020-02-06 DIAGNOSIS — K21.9 GASTROESOPHAGEAL REFLUX DISEASE WITHOUT ESOPHAGITIS: ICD-10-CM

## 2020-02-06 DIAGNOSIS — M15.9 PRIMARY OSTEOARTHRITIS INVOLVING MULTIPLE JOINTS: ICD-10-CM

## 2020-02-06 DIAGNOSIS — G89.4 CHRONIC PAIN SYNDROME: Primary | ICD-10-CM

## 2020-02-06 DIAGNOSIS — I83.93 VARICOSE VEINS OF BOTH LOWER EXTREMITIES, UNSPECIFIED WHETHER COMPLICATED: ICD-10-CM

## 2020-02-06 DIAGNOSIS — I87.2 EDEMA OF LEFT LOWER EXTREMITY DUE TO PERIPHERAL VENOUS INSUFFICIENCY: ICD-10-CM

## 2020-02-06 DIAGNOSIS — I10 ESSENTIAL HYPERTENSION: ICD-10-CM

## 2020-02-06 PROCEDURE — 99214 OFFICE O/P EST MOD 30 MIN: CPT | Performed by: INTERNAL MEDICINE

## 2020-02-06 RX ORDER — METOPROLOL TARTRATE 100 MG/1
100 TABLET ORAL 2 TIMES DAILY
Qty: 180 TABLET | Refills: 1 | Status: SHIPPED | OUTPATIENT
Start: 2020-02-06 | End: 2020-09-03 | Stop reason: SDUPTHER

## 2020-02-06 RX ORDER — HYDROCODONE BITARTRATE AND ACETAMINOPHEN 7.5; 325 MG/1; MG/1
1 TABLET ORAL EVERY 8 HOURS PRN
Qty: 90 TABLET | Refills: 0 | Status: SHIPPED | OUTPATIENT
Start: 2020-02-06 | End: 2020-03-08 | Stop reason: SDUPTHER

## 2020-02-06 RX ORDER — PANTOPRAZOLE SODIUM 40 MG/1
40 TABLET, DELAYED RELEASE ORAL DAILY
Qty: 90 TABLET | Refills: 1 | Status: SHIPPED | OUTPATIENT
Start: 2020-02-06 | End: 2020-07-06

## 2020-02-06 NOTE — PROGRESS NOTES
Subjective   Lula Decker is a 64 y.o. female.     Chief Complaint   Patient presents with   • Nausea   • Pain   • Foot Swelling     Left    • Heartburn   • Hypertension       History of Present Illness   Follow-up for hypertension.  Currently has been feeling well and asymptomatic without any headaches,vision changes, cough, chest pain, shortness of breath, swelling, focal neurologic deficit, memory loss or syncope.  Has been taking the medications regularly and adherent with the regimen, Denies medication side effects and no significant interval events.   Follow up with continued knee pain that intermittently flares and is severe in the right.  Has seen ortho and only options are injections or surgery but patient refuses these options at this time.  Follow-up with GERD and intermittent nausea lasting 5- 10 minutes then self resolves.  This has been a chronic issue for a long time.  Continues to have chronic pain in the back, shoulders and knee.  Using the hydrocodone as needed and averages 2-3 times per day.  Denies side effects or issues with the problems.  Still some swelling in the left ankle that resolves with elevation and when lays down.  No pain or redness.    The following portions of the patient's history were reviewed and updated as appropriate: allergies, current medications, past family history, past medical history, past social history, past surgical history and problem list.    Depression Screen:  No flowsheet data found.    Past Medical History:   Diagnosis Date   • Abnormal findings on diagnostic imaging of abdomen    • Alopecia areata    • Asthma    • Bleeding external hemorrhoids    • BMI 25.0-25.9,adult    • Calf cramp    • Carpal tunnel syndrome    • Chest pain    • Chest wall pain    • Constipation    • COPD (chronic obstructive pulmonary disease) (CMS/HCC)    • COPD with acute exacerbation (CMS/[facility])    • Discoloration of skin of foot    • Dyslipidemia    • Dysuria    • Eczema    • Encounter  for immunization    • Esophageal reflux    • Fatty liver    • GERD (gastroesophageal reflux disease)    • Headache    • Hives of unknown origin    • Hyperactivity of bladder    • Hypertension    • IBS (irritable bowel syndrome)    • Incontinence of urine    • Insomnia    • Menopausal symptom    • Migraine    • MRSA exposure    • Muscle spasm    • Nocturia    • OA (osteoarthritis)    • Obstructive chronic bronchitis with acute exacerbation (CMS/HCC)    • Oral thrush    • Osteoarthritis of left knee    • Osteoporosis    • PAD (peripheral artery disease) (CMS/HCC)    • Sinusitis, acute    • Synovial cyst of popliteal space    • Tremor    • Tubular adenoma of colon    • Urinary frequency    • Urinary urgency    • Urinary, incontinence, stress female    • UTI (urinary tract infection)        Past Surgical History:   Procedure Laterality Date   • BREAST LUMPECTOMY     • BREAST SURGERY     • COLONOSCOPY     • HYSTERECTOMY      Not due to cancer   • NECK SURGERY     • NEUROPLASTY      Median nerve at carpal tunnel.   • OOPHORECTOMY     • OTHER SURGICAL HISTORY  08/20/2014    Esophadogastroduodenoscopy - Gastritis    • TONSILLECTOMY     • TUBAL ABDOMINAL LIGATION         Family History   Problem Relation Age of Onset   • Heart failure Father        Social History     Socioeconomic History   • Marital status:      Spouse name: Not on file   • Number of children: Not on file   • Years of education: Not on file   • Highest education level: Not on file   Tobacco Use   • Smoking status: Current Every Day Smoker     Packs/day: 0.50     Types: Cigarettes   • Smokeless tobacco: Never Used   Substance and Sexual Activity   • Alcohol use: Yes   • Drug use: No   • Sexual activity: Defer       Current Outpatient Medications   Medication Sig Dispense Refill   • albuterol sulfate  (90 Base) MCG/ACT inhaler Inhale 2 puffs Every 4 (Four) Hours As Needed for Wheezing.     • amitriptyline (ELAVIL) 100 MG tablet Take 1 tablet by  mouth Every Night. 90 tablet 1   • doxylamine (UNISOM) 25 MG tablet Take 1 tablet by mouth Daily.     • HYDROcodone-acetaminophen (NORCO) 7.5-325 MG per tablet Take 1 tablet by mouth Every 8 (Eight) Hours As Needed for Moderate Pain . 90 tablet 0   • hydrocortisone 2.5 % cream Apply 2.5 application topically to the appropriate area as directed 3 (Three) Times a Day.     • IPRATROPIUM-ALBUTEROL IN Inhale.     • metoprolol tartrate (LOPRESSOR) 100 MG tablet Take 1 tablet by mouth 2 (Two) Times a Day. 180 tablet 1   • mometasone-formoterol (DULERA 200) 200-5 MCG/ACT inhaler Inhale 2 puffs 2 (Two) Times a Day. Rinse Mouth After Use 39 g 1   • naproxen sodium (ALEVE) 220 MG tablet Take 220 mg by mouth 2 (Two) Times a Day As Needed.     • pantoprazole (PROTONIX) 40 MG EC tablet Take 1 tablet by mouth Daily. 90 tablet 1   • sennosides-docusate (PERICOLACE) 8.6-50 MG per tablet Take 3 tablets by mouth Daily.     • triamcinolone (KENALOG) 0.1 % cream Apply  topically to the appropriate area as directed 2 (Two) Times a Day.       No current facility-administered medications for this visit.        Review of Systems   Constitutional: Negative for activity change, appetite change, fatigue, fever, unexpected weight gain and unexpected weight loss.   HENT: Negative for nosebleeds, rhinorrhea, trouble swallowing and voice change.    Eyes: Negative for visual disturbance.   Respiratory: Positive for cough and shortness of breath. Negative for chest tightness and wheezing.    Cardiovascular: Negative for chest pain, palpitations and leg swelling.        Left ankle swelling and varicose viens   Gastrointestinal: Positive for nausea and GERD. Negative for abdominal pain, blood in stool, constipation, diarrhea, vomiting and indigestion.   Genitourinary: Negative for dysuria, frequency and hematuria.   Musculoskeletal: Positive for arthralgias. Negative for back pain and myalgias.   Skin: Negative for rash and bruise.   Neurological:  "Negative for dizziness, tremors, weakness, light-headedness, numbness, headache and memory problem.   Hematological: Negative for adenopathy. Does not bruise/bleed easily.   Psychiatric/Behavioral: Negative for sleep disturbance and depressed mood. The patient is not nervous/anxious.        Objective   /86 (BP Location: Right arm, Patient Position: Sitting, Cuff Size: Adult)   Pulse 81   Ht 167.6 cm (65.98\")   Wt 73.9 kg (163 lb)   SpO2 95%   BMI 26.32 kg/m²     Physical Exam   Constitutional: She is oriented to person, place, and time. She appears well-developed and well-nourished. No distress.   HENT:   Head: Normocephalic and atraumatic.   Right Ear: External ear normal.   Left Ear: External ear normal.   Nose: Nose normal.   Mouth/Throat: Oropharynx is clear and moist.   Eyes: Pupils are equal, round, and reactive to light. Conjunctivae and EOM are normal.   Neck: Normal range of motion. Neck supple. No tracheal deviation present. No thyromegaly present.   Cardiovascular: Normal rate, regular rhythm, normal heart sounds and intact distal pulses. Exam reveals no gallop and no friction rub.   No murmur heard.  Left greater than right varicose veins.   Pulmonary/Chest: Effort normal and breath sounds normal. No respiratory distress.   On 2L nasal canula oxygen.  Diffuse mild expiratory wheezes.   Abdominal: Soft. Bowel sounds are normal. She exhibits no mass. There is no tenderness. There is no guarding.   Musculoskeletal: Normal range of motion. She exhibits edema.   Trace left ankle edema   Lymphadenopathy:     She has no cervical adenopathy.   Neurological: She is alert and oriented to person, place, and time. She displays normal reflexes. She exhibits normal muscle tone.   Skin: Skin is warm and dry. Capillary refill takes less than 2 seconds. No rash noted. She is not diaphoretic.   Psychiatric: She has a normal mood and affect. Her behavior is normal. Judgment and thought content normal.   Nursing " note and vitals reviewed.      No results found for this or any previous visit (from the past 2016 hour(s)).  Assessment/Plan   Lula was seen today for nausea, pain, foot swelling, heartburn and hypertension.    Diagnoses and all orders for this visit:    Chronic pain syndrome  -     HYDROcodone-acetaminophen (NORCO) 7.5-325 MG per tablet; Take 1 tablet by mouth Every 8 (Eight) Hours As Needed for Moderate Pain .  -     Compliance Drug Analysis, Ur - Urine, Clean Catch    Primary osteoarthritis involving multiple joints  -     HYDROcodone-acetaminophen (NORCO) 7.5-325 MG per tablet; Take 1 tablet by mouth Every 8 (Eight) Hours As Needed for Moderate Pain .    Gastroesophageal reflux disease without esophagitis  -     pantoprazole (PROTONIX) 40 MG EC tablet; Take 1 tablet by mouth Daily.    Essential hypertension  -     metoprolol tartrate (LOPRESSOR) 100 MG tablet; Take 1 tablet by mouth 2 (Two) Times a Day.    Edema of left lower extremity due to peripheral venous insufficiency    Varicose veins of both lower extremities, unspecified whether complicated    Discussed the varicose veins and the swelling is likely secondary to the venous insufficiency of the varicose veins.  Recommended compression stockings.  Continue the current medications and BP is controlled.  No change in antihypertensive at this time.

## 2020-02-12 LAB — DRUGS UR: NORMAL

## 2020-02-17 RX ORDER — MOMETASONE FUROATE AND FORMOTEROL FUMARATE DIHYDRATE 200; 5 UG/1; UG/1
AEROSOL RESPIRATORY (INHALATION)
Qty: 39 G | Refills: 1 | Status: SHIPPED | OUTPATIENT
Start: 2020-02-17 | End: 2020-12-07

## 2020-02-18 ENCOUNTER — TELEPHONE (OUTPATIENT)
Dept: FAMILY MEDICINE CLINIC | Facility: CLINIC | Age: 65
End: 2020-02-18

## 2020-02-18 NOTE — TELEPHONE ENCOUNTER
[place] from [facility] called to see if you got the form for Oxygen?  Phone number is [phone] ext [phone].

## 2020-02-19 NOTE — TELEPHONE ENCOUNTER
I have not received anything for this patient. I called [facility] to have them refax the form to me.

## 2020-03-03 ENCOUNTER — TELEPHONE (OUTPATIENT)
Dept: FAMILY MEDICINE CLINIC | Facility: CLINIC | Age: 65
End: 2020-03-03

## 2020-03-03 DIAGNOSIS — J44.9 CHRONIC OBSTRUCTIVE PULMONARY DISEASE, UNSPECIFIED COPD TYPE (HCC): ICD-10-CM

## 2020-03-03 DIAGNOSIS — J45.20 MILD INTERMITTENT ASTHMA WITHOUT COMPLICATION: Primary | ICD-10-CM

## 2020-03-03 NOTE — TELEPHONE ENCOUNTER
"Requesting order for nebulizer mouthpiece - \"adminstrative kit\" - faxed to [place] [phone]  "

## 2020-03-08 DIAGNOSIS — M15.9 PRIMARY OSTEOARTHRITIS INVOLVING MULTIPLE JOINTS: ICD-10-CM

## 2020-03-08 DIAGNOSIS — G89.4 CHRONIC PAIN SYNDROME: ICD-10-CM

## 2020-03-09 RX ORDER — HYDROCODONE BITARTRATE AND ACETAMINOPHEN 7.5; 325 MG/1; MG/1
1 TABLET ORAL EVERY 8 HOURS PRN
Qty: 90 TABLET | Refills: 0 | Status: SHIPPED | OUTPATIENT
Start: 2020-03-09 | End: 2020-04-07 | Stop reason: SDUPTHER

## 2020-03-30 ENCOUNTER — TELEPHONE (OUTPATIENT)
Dept: FAMILY MEDICINE CLINIC | Facility: CLINIC | Age: 65
End: 2020-03-30

## 2020-03-30 RX ORDER — AMITRIPTYLINE HYDROCHLORIDE 100 MG/1
100 TABLET, FILM COATED ORAL NIGHTLY
Qty: 90 TABLET | Refills: 1 | Status: SHIPPED | OUTPATIENT
Start: 2020-03-30 | End: 2020-05-07 | Stop reason: SDUPTHER

## 2020-03-30 NOTE — TELEPHONE ENCOUNTER
Spoke with patient she states she has called [facility] to see what they would do for her. Patient states they can help her out with a new nebulizer as long as she has a prescription. Please advise and write prescription for Edgar to be faxed.

## 2020-03-30 NOTE — TELEPHONE ENCOUNTER
Patients nebulizer went out and she has stage 4 COPD. Needs to know what she can do? Please call [phone]

## 2020-03-30 NOTE — TELEPHONE ENCOUNTER
Spoke with patient she states she has called [facility] to see what they would do for her. Patient states they can help her out with a new nebulizer as long as she has a prescription. Please advise and write prescription for Edgar to be faxed.

## 2020-03-30 NOTE — TELEPHONE ENCOUNTER
Called patient to let her know she can get a new machine through the company that sent her the last one. She states she wants to get out of the contract with them and go to [facility]. I told her to call [facility] to see if she can get a new machine since she gets supplies through them.

## 2020-04-01 ENCOUNTER — TELEPHONE (OUTPATIENT)
Dept: FAMILY MEDICINE CLINIC | Facility: CLINIC | Age: 65
End: 2020-04-01

## 2020-04-01 NOTE — TELEPHONE ENCOUNTER
Pt called and stated the a new DWO needs to be faxed to [facility] for Nebulizer with compressor. Please fax demographic and ins card with DWO order.   Pt ask that you call her if you have any questions.

## 2020-04-02 ENCOUNTER — DOCUMENTATION (OUTPATIENT)
Dept: FAMILY MEDICINE CLINIC | Facility: CLINIC | Age: 65
End: 2020-04-02

## 2020-04-06 DIAGNOSIS — M15.9 PRIMARY OSTEOARTHRITIS INVOLVING MULTIPLE JOINTS: ICD-10-CM

## 2020-04-06 DIAGNOSIS — G89.4 CHRONIC PAIN SYNDROME: ICD-10-CM

## 2020-04-06 RX ORDER — HYDROCODONE BITARTRATE AND ACETAMINOPHEN 7.5; 325 MG/1; MG/1
1 TABLET ORAL EVERY 8 HOURS PRN
Qty: 90 TABLET | Refills: 0 | Status: CANCELLED | OUTPATIENT
Start: 2020-04-06

## 2020-04-07 DIAGNOSIS — G89.4 CHRONIC PAIN SYNDROME: ICD-10-CM

## 2020-04-07 DIAGNOSIS — M15.9 PRIMARY OSTEOARTHRITIS INVOLVING MULTIPLE JOINTS: ICD-10-CM

## 2020-04-07 RX ORDER — HYDROCODONE BITARTRATE AND ACETAMINOPHEN 7.5; 325 MG/1; MG/1
1 TABLET ORAL EVERY 8 HOURS PRN
Qty: 90 TABLET | Refills: 0 | Status: SHIPPED | OUTPATIENT
Start: 2020-04-07 | End: 2020-05-07 | Stop reason: SDUPTHER

## 2020-04-27 RX ORDER — IPRATROPIUM BROMIDE AND ALBUTEROL SULFATE 2.5; .5 MG/3ML; MG/3ML
3 SOLUTION RESPIRATORY (INHALATION)
Qty: 360 ML | Refills: 3 | Status: SHIPPED | OUTPATIENT
Start: 2020-04-27 | End: 2020-08-10

## 2020-05-07 ENCOUNTER — OFFICE VISIT (OUTPATIENT)
Dept: FAMILY MEDICINE CLINIC | Facility: CLINIC | Age: 65
End: 2020-05-07

## 2020-05-07 DIAGNOSIS — G89.4 CHRONIC PAIN SYNDROME: ICD-10-CM

## 2020-05-07 DIAGNOSIS — I10 ESSENTIAL HYPERTENSION: ICD-10-CM

## 2020-05-07 DIAGNOSIS — Z00.00 MEDICARE ANNUAL WELLNESS VISIT, SUBSEQUENT: Primary | ICD-10-CM

## 2020-05-07 DIAGNOSIS — J44.9 CHRONIC OBSTRUCTIVE PULMONARY DISEASE, UNSPECIFIED COPD TYPE (HCC): ICD-10-CM

## 2020-05-07 DIAGNOSIS — M15.9 PRIMARY OSTEOARTHRITIS INVOLVING MULTIPLE JOINTS: ICD-10-CM

## 2020-05-07 DIAGNOSIS — F51.01 PRIMARY INSOMNIA: ICD-10-CM

## 2020-05-07 DIAGNOSIS — N39.0 URINARY TRACT INFECTION WITHOUT HEMATURIA, SITE UNSPECIFIED: ICD-10-CM

## 2020-05-07 PROCEDURE — 99213 OFFICE O/P EST LOW 20 MIN: CPT | Performed by: INTERNAL MEDICINE

## 2020-05-07 PROCEDURE — G0439 PPPS, SUBSEQ VISIT: HCPCS | Performed by: INTERNAL MEDICINE

## 2020-05-07 RX ORDER — HYDROCODONE BITARTRATE AND ACETAMINOPHEN 7.5; 325 MG/1; MG/1
1 TABLET ORAL EVERY 8 HOURS PRN
Qty: 90 TABLET | Refills: 0 | Status: SHIPPED | OUTPATIENT
Start: 2020-05-07 | End: 2020-06-08 | Stop reason: SDUPTHER

## 2020-05-07 RX ORDER — AMITRIPTYLINE HYDROCHLORIDE 100 MG/1
100 TABLET, FILM COATED ORAL NIGHTLY
Qty: 90 TABLET | Refills: 1 | Status: SHIPPED | OUTPATIENT
Start: 2020-05-07 | End: 2020-08-24 | Stop reason: SDUPTHER

## 2020-05-07 RX ORDER — CEPHALEXIN 500 MG/1
500 CAPSULE ORAL 3 TIMES DAILY
Qty: 30 CAPSULE | Refills: 0 | Status: SHIPPED | OUTPATIENT
Start: 2020-05-07 | End: 2020-09-03

## 2020-05-07 NOTE — PROGRESS NOTES
Initial Medicare Wellness Visit   The ABC's of the Annual Wellness Visit    Chief Complaint   Patient presents with   • medicare subsequent wellness   • Urinary Tract Infection   • Rash   • Hypertension       HPI:  You have chosen to receive care through a telehealth visit.  Do you consent to use a video/audio connection for your medical care today? Yes  Patient unable to log in to the [employer] from home computer and connected video through [email or web address] secure video format    Lula Decker, -1955, is a 64 y.o. female who presents for an Initial Medicare Wellness Visit.    Patient is having several days of urinary frequency, burning and her typical left arm discomfort.  Has rash on the inner aspect of both breast that does not itch, burn or hurt.  Is only red.  Follow-up for hypertension.  Currently has been feeling well and asymptomatic without any headaches,vision changes, cough, chest pain, shortness of breath, swelling, focal neurologic deficit, memory loss or syncope.  Has been taking the medications regularly and adherent with the regimen, Denies medication side effects and no significant interval events.   Follow up with continued knee pain that intermittently flares and is severe in the right.  Has seen ortho and only options are injections or surgery but patient refuses these options at this time.  Follow-up with GERD and intermittent nausea lasting 5- 10 minutes then self resolves.  This has been a chronic issue for a long time.  Continues to have chronic pain in the back, shoulders and knee.  Using the hydrocodone as needed and averages 2-3 times per day.  Denies side effects or issues with the problems.  Patient with COPD and on oxygen nasal canula and using the inhaled medications is stable to slightly worsening and continues to see pulmonology.    Recent Hospitalizations:  No hospitalization(s) within the last year..    Current Medical Providers:  Patient Care Team:  Nghia Eaton MD as  PCP - General (Internal Medicine)  Jeb Smart MD as Consulting Physician (Orthopedic Surgery)  Stefani Prince MD (Pulmonary Disease)    Health Habits and Functional and Cognitive Screening and Depression Screening:  Functional & Cognitive Status 5/7/2020   Do you have difficulty preparing food and eating? No   Do you have difficulty bathing yourself, getting dressed or grooming yourself? No   Do you have difficulty using the toilet? No   Do you have difficulty moving around from place to place? No   Do you have trouble with steps or getting out of a bed or a chair? No   Current Diet Well Balanced Diet   Dental Exam Not up to date   Eye Exam Up to date   Exercise (times per week) 0 times per week   Current Exercise Activities Include None   Do you need help using the phone?  No   Are you deaf or do you have serious difficulty hearing?  No   Do you need help with transportation? No   Do you need help shopping? No   Do you need help preparing meals?  No   Do you need help with housework?  No   Do you need help with laundry? No   Do you need help taking your medications? No   Do you need help managing money? No   Do you ever drive or ride in a car without wearing a seat belt? No   Have you felt unusual stress, anger or loneliness in the last month? No   Who do you live with? Alone   If you need help, do you have trouble finding someone available to you? No   Have you been bothered in the last four weeks by sexual problems? No   Do you have difficulty concentrating, remembering or making decisions? No       Compared to one year ago, the patient feels her physical health is the same and her mental health is the same.    Depression Screen:  PHQ-2/PHQ-9 Depression Screening 5/7/2020   Little interest or pleasure in doing things 0   Feeling down, depressed, or hopeless 0   Trouble falling or staying asleep, or sleeping too much 0   Feeling tired or having little energy 0   Poor appetite or overeating 0   Feeling  bad about yourself - or that you are a failure or have let yourself or your family down 0   Trouble concentrating on things, such as reading the newspaper or watching television 0   Moving or speaking so slowly that other people could have noticed. Or the opposite - being so fidgety or restless that you have been moving around a lot more than usual 0   Thoughts that you would be better off dead, or of hurting yourself in some way 0   Total Score 0         Past Medical/Family/Social History:  The following portions of the patient's history were reviewed and updated as appropriate: allergies, current medications, past family history, past medical history, past social history, past surgical history and problem list.    No Known Allergies      Current Outpatient Medications:   •  amitriptyline (ELAVIL) 100 MG tablet, Take 1 tablet by mouth Every Night., Disp: 90 tablet, Rfl: 1  •  cephalexin (Keflex) 500 MG capsule, Take 1 capsule by mouth 3 (Three) Times a Day., Disp: 30 capsule, Rfl: 0  •  doxylamine (UNISOM) 25 MG tablet, Take 1 tablet by mouth Daily., Disp: , Rfl:   •  DULERA 200-5 MCG/ACT inhaler, INHALE 2 PUFFS BY MOUTH 2  TIMES A DAY. RINSE MOUTH  AFTER USE, Disp: 39 g, Rfl: 1  •  HYDROcodone-acetaminophen (NORCO) 7.5-325 MG per tablet, Take 1 tablet by mouth Every 8 (Eight) Hours As Needed for Moderate Pain ., Disp: 90 tablet, Rfl: 0  •  hydrocortisone 2.5 % cream, Apply 2.5 application topically to the appropriate area as directed 3 (Three) Times a Day., Disp: , Rfl:   •  ipratropium-albuterol (DUO-NEB) 0.5-2.5 mg/3 ml nebulizer, Take 3 mL by nebulization 4 (Four) Times a Day., Disp: 360 mL, Rfl: 3  •  metoprolol tartrate (LOPRESSOR) 100 MG tablet, Take 1 tablet by mouth 2 (Two) Times a Day., Disp: 180 tablet, Rfl: 1  •  naproxen sodium (ALEVE) 220 MG tablet, Take 220 mg by mouth 2 (Two) Times a Day As Needed., Disp: , Rfl:   •  pantoprazole (PROTONIX) 40 MG EC tablet, Take 1 tablet by mouth Daily., Disp: 90  tablet, Rfl: 1  •  PROAIR  (90 Base) MCG/ACT inhaler, INHALE 2 PUFFS EVERY 4 HOURS AS NEEDED, Disp: 8.5 g, Rfl: 6  •  triamcinolone (KENALOG) 0.1 % cream, Apply  topically to the appropriate area as directed 2 (Two) Times a Day., Disp: , Rfl:     Aspirin use counseling: Does not need ASA (and currently is not on it)    Current medication list contains no high risk medications.  No harmful drug interactions have been identified.     Family History   Problem Relation Age of Onset   • Heart failure Father        Social History     Tobacco Use   • Smoking status: Current Every Day Smoker     Packs/day: 0.50     Types: Cigarettes   • Smokeless tobacco: Never Used   Substance Use Topics   • Alcohol use: Yes       Past Surgical History:   Procedure Laterality Date   • BREAST LUMPECTOMY     • BREAST SURGERY     • COLONOSCOPY     • HYSTERECTOMY      Not due to cancer   • NECK SURGERY     • NEUROPLASTY      Median nerve at carpal tunnel.   • OOPHORECTOMY     • OTHER SURGICAL HISTORY  08/20/2014    Esophadogastroduodenoscopy - Gastritis    • TONSILLECTOMY     • TUBAL ABDOMINAL LIGATION         Patient Active Problem List   Diagnosis   • Chronic pain syndrome   • Hypertension   • GERD (gastroesophageal reflux disease)   • UTI (urinary tract infection)   • Right knee pain   • Visual disturbances   • Postmenopausal symptoms   • Worsening headaches   • Hyperlipidemia   • Urinary symptom or sign   • OA (osteoarthritis)   • Edema of left ankle   • Edema of left lower extremity due to peripheral venous insufficiency   • Varicose veins of both lower extremities   • Asthma   • COPD (chronic obstructive pulmonary disease) (CMS/[facility])   • Insomnia       Review of Systems   Constitutional: Negative for activity change, appetite change, fatigue, fever and unexpected weight change.   HENT: Negative for congestion, ear pain, hearing loss, rhinorrhea and sore throat.    Eyes: Negative for visual disturbance.   Respiratory: Positive for  shortness of breath. Negative for cough, chest tightness and wheezing.    Cardiovascular: Negative for chest pain, palpitations and leg swelling.   Gastrointestinal: Negative for abdominal pain, blood in stool, constipation and diarrhea.   Endocrine: Negative for polydipsia, polyphagia and polyuria.   Genitourinary: Positive for dysuria and frequency. Negative for hematuria.   Musculoskeletal: Negative for arthralgias, gait problem and myalgias.   Skin: Positive for rash. Negative for wound.   Neurological: Negative for dizziness, tremors, syncope, speech difficulty, weakness, light-headedness and numbness.   Psychiatric/Behavioral: Negative for behavioral problems and sleep disturbance. The patient is not nervous/anxious.        Objective     There were no vitals filed for this visit.    Patient's There is no height or weight on file to calculate BMI. BMI is above normal parameters. Recommendations include: nutrition counseling.      No exam data present    The patient has no evidence of cognitve impairment.     Physical Exam   Constitutional: She is oriented to person, place, and time. She appears well-developed and well-nourished. No distress.   HENT:   Head: Normocephalic and atraumatic.   Pulmonary/Chest: Effort normal. No respiratory distress.   On 3L nasal canula oxygen   Neurological: She is alert and oriented to person, place, and time.   Skin: She is not diaphoretic.   Psychiatric: She has a normal mood and affect. Her behavior is normal. Judgment and thought content normal.       Recent Lab Results:  Lab Results   Component Value Date    GLU 77 08/08/2019     Lab Results   Component Value Date    TRIG 156 (H) 08/08/2019    HDL 68 08/08/2019    VLDL 31 08/08/2019         Assessment/Plan   Age-appropriate Screening Schedule:  Refer to the list below for future screening recommendations based on patient's age, sex and/or medical conditions.      Health Maintenance   Topic Date Due   • TDAP/TD VACCINES (1 -  Tdap) 06/27/1966   • ZOSTER VACCINE (1 of 2) 06/27/2005   • MAMMOGRAM  02/05/2021 (Originally 1955)   • DXA SCAN  02/05/2021 (Originally 8/8/2019)   • INFLUENZA VACCINE  08/01/2020   • LIPID PANEL  08/08/2020   • COLONOSCOPY  08/20/2024   • PNEUMOCOCCAL VACCINE (19-64 MEDIUM RISK)  Completed       Medicare Risks and Personalized Health Plan:  Advance Directive Discussion  Chronic Pain       CMS-Preventive Services Quick Reference  Medicare Preventive Services Addressed:  Annual Wellness Visit (AWV)    Advance Care Planning:  ACP discussion was held with the patient during this visit. Patient has an advance directive (not in EMR), copy requested.    Diagnoses and all orders for this visit:    1. Urinary tract infection without hematuria, site unspecified (Primary)  -     cephalexin (Keflex) 500 MG capsule; Take 1 capsule by mouth 3 (Three) Times a Day.  Dispense: 30 capsule; Refill: 0    2. Chronic pain syndrome  -     HYDROcodone-acetaminophen (NORCO) 7.5-325 MG per tablet; Take 1 tablet by mouth Every 8 (Eight) Hours As Needed for Moderate Pain .  Dispense: 90 tablet; Refill: 0    3. Primary osteoarthritis involving multiple joints  -     HYDROcodone-acetaminophen (NORCO) 7.5-325 MG per tablet; Take 1 tablet by mouth Every 8 (Eight) Hours As Needed for Moderate Pain .  Dispense: 90 tablet; Refill: 0    4. Chronic obstructive pulmonary disease, unspecified COPD type (CMS/[facility])    5. Essential hypertension    6. Primary insomnia  -     amitriptyline (ELAVIL) 100 MG tablet; Take 1 tablet by mouth Every Night.  Dispense: 90 tablet; Refill: 1      Reviewed history and annual wellness visit with patient during office time.  Medications reviewed as appropriate.  Discussed advanced directives and living will.  Patient has living will: Living will: yes and patient will bring copy to office.  Discussed fall risk and precautions encourage removing throw rugs and using grab bars within the home and bathroom.  Will check  the labs as ordered above to evaluate the blood sugars, kidney, liver, cholesterol for screening.  Discussed flu shot recommended to get the high-dose influenza vaccine annually in the fall.  Prevnar-13 and pneumovax-23 up to date and appropriate.  Shingrix vaccination series and Dtap recommended.  Encourage follow-up with the eye doctor on annual basis for glaucoma evaluation.  Discussed weight and encouraged exercise as tolerated while following a healthy diet.  Colon cancer screening discussed and current status: colonoscopy next due 8/20/24.  Recommend to get annual mammograms.  Follow-up with pulmonology as scheduled.  Will treat for the likely UTI with symptoms.  Observe rash.  If worsens or persists then follow up.    Visit completed using [employer] website secure video connection as patient could not log into the "[employer] video but completed the online information gathering.  Time of visit in care, review and discussion of care and problems face to face of 30 minutes not including charting time.    An After Visit Summary and PPPS with all of these plans were given to the patient.      Follow Up:  No follow-ups on file.

## 2020-05-07 NOTE — PATIENT INSTRUCTIONS
Medicare Wellness  Personal Prevention Plan of Service     Date of Office Visit:  2020  Encounter Provider:  Nghia Eaton MD  Place of Service:  [facility]  Patient Name: Lula Decker  :  1955    As part of the Medicare Wellness portion of your visit today, we are providing you with this personalized preventive plan of services (PPPS). This plan is based upon recommendations of the [place] Preventive Services Task Force (USPSTF) and the Advisory Committee on Immunization Practices (ACIP).    This lists the preventive care services that should be considered, and provides dates of when you are due. Items listed as completed are up-to-date and do not require any further intervention.    Health Maintenance   Topic Date Due   • TDAP/TD VACCINES (1 - Tdap) 1966   • ZOSTER VACCINE (1 of 2) 2005   • HEPATITIS C SCREENING  2019   • MEDICARE ANNUAL WELLNESS  2019   • MAMMOGRAM  2021 (Originally 1955)   • DXA SCAN  2021 (Originally 2019)   • INFLUENZA VACCINE  2020   • LIPID PANEL  2020   • COLONOSCOPY  2024   • PNEUMOCOCCAL VACCINE (19-64 MEDIUM RISK)  Completed       No orders of the defined types were placed in this encounter.      No follow-ups on file.

## 2020-06-08 DIAGNOSIS — M15.9 PRIMARY OSTEOARTHRITIS INVOLVING MULTIPLE JOINTS: ICD-10-CM

## 2020-06-08 DIAGNOSIS — G89.4 CHRONIC PAIN SYNDROME: ICD-10-CM

## 2020-06-08 RX ORDER — HYDROCODONE BITARTRATE AND ACETAMINOPHEN 7.5; 325 MG/1; MG/1
1 TABLET ORAL EVERY 8 HOURS PRN
Qty: 90 TABLET | Refills: 0 | Status: SHIPPED | OUTPATIENT
Start: 2020-06-08 | End: 2020-07-05 | Stop reason: SDUPTHER

## 2020-06-16 ENCOUNTER — TELEPHONE (OUTPATIENT)
Dept: FAMILY MEDICINE CLINIC | Facility: CLINIC | Age: 65
End: 2020-06-16

## 2020-06-16 NOTE — TELEPHONE ENCOUNTER
PATIENT STATE: that she has another Urinary tract infection. She would like for some meds to be called in to treat. Please advise     PATIENT CAN BE REACHED ON:[phone]    PHARMACY PREFERRED:[facility] - [place] [id number] [facility] [phone] [facility] [phone]

## 2020-06-17 DIAGNOSIS — R39.9 URINARY SYMPTOM OR SIGN: ICD-10-CM

## 2020-06-17 DIAGNOSIS — N39.0 URINARY TRACT INFECTION WITHOUT HEMATURIA, SITE UNSPECIFIED: Primary | ICD-10-CM

## 2020-06-19 LAB
BACTERIA UR CULT: NORMAL
BACTERIA UR CULT: NORMAL

## 2020-07-05 DIAGNOSIS — G89.4 CHRONIC PAIN SYNDROME: ICD-10-CM

## 2020-07-05 DIAGNOSIS — M15.9 PRIMARY OSTEOARTHRITIS INVOLVING MULTIPLE JOINTS: ICD-10-CM

## 2020-07-05 DIAGNOSIS — K21.9 GASTROESOPHAGEAL REFLUX DISEASE WITHOUT ESOPHAGITIS: ICD-10-CM

## 2020-07-06 RX ORDER — PANTOPRAZOLE SODIUM 40 MG/1
40 TABLET, DELAYED RELEASE ORAL DAILY
Qty: 90 TABLET | Refills: 1 | Status: SHIPPED | OUTPATIENT
Start: 2020-07-06 | End: 2020-08-11 | Stop reason: SDUPTHER

## 2020-07-06 RX ORDER — HYDROCODONE BITARTRATE AND ACETAMINOPHEN 7.5; 325 MG/1; MG/1
1 TABLET ORAL EVERY 8 HOURS PRN
Qty: 90 TABLET | Refills: 0 | Status: SHIPPED | OUTPATIENT
Start: 2020-07-06 | End: 2020-09-03 | Stop reason: SDUPTHER

## 2020-08-10 RX ORDER — IPRATROPIUM BROMIDE AND ALBUTEROL SULFATE 2.5; .5 MG/3ML; MG/3ML
SOLUTION RESPIRATORY (INHALATION)
Qty: 360 ML | Refills: 3 | Status: SHIPPED | OUTPATIENT
Start: 2020-08-10 | End: 2020-12-02

## 2020-08-11 DIAGNOSIS — K21.9 GASTROESOPHAGEAL REFLUX DISEASE WITHOUT ESOPHAGITIS: ICD-10-CM

## 2020-08-11 RX ORDER — PANTOPRAZOLE SODIUM 40 MG/1
40 TABLET, DELAYED RELEASE ORAL DAILY
Qty: 90 TABLET | Refills: 1 | Status: SHIPPED | OUTPATIENT
Start: 2020-08-11 | End: 2021-02-03

## 2020-08-24 DIAGNOSIS — G89.4 CHRONIC PAIN SYNDROME: ICD-10-CM

## 2020-08-24 DIAGNOSIS — M15.9 PRIMARY OSTEOARTHRITIS INVOLVING MULTIPLE JOINTS: ICD-10-CM

## 2020-08-24 DIAGNOSIS — F51.01 PRIMARY INSOMNIA: ICD-10-CM

## 2020-08-25 RX ORDER — AMITRIPTYLINE HYDROCHLORIDE 100 MG/1
100 TABLET, FILM COATED ORAL NIGHTLY
Qty: 90 TABLET | Refills: 1 | Status: SHIPPED | OUTPATIENT
Start: 2020-08-25 | End: 2020-09-03 | Stop reason: SDUPTHER

## 2020-08-26 RX ORDER — HYDROCODONE BITARTRATE AND ACETAMINOPHEN 7.5; 325 MG/1; MG/1
1 TABLET ORAL EVERY 8 HOURS PRN
Qty: 90 TABLET | Refills: 0 | OUTPATIENT
Start: 2020-08-26

## 2020-09-03 ENCOUNTER — OFFICE VISIT (OUTPATIENT)
Dept: FAMILY MEDICINE CLINIC | Facility: CLINIC | Age: 65
End: 2020-09-03

## 2020-09-03 VITALS
TEMPERATURE: 98 F | HEART RATE: 91 BPM | SYSTOLIC BLOOD PRESSURE: 132 MMHG | WEIGHT: 160 LBS | DIASTOLIC BLOOD PRESSURE: 80 MMHG | BODY MASS INDEX: 25.71 KG/M2 | OXYGEN SATURATION: 97 % | HEIGHT: 66 IN

## 2020-09-03 DIAGNOSIS — Z23 IMMUNIZATION DUE: ICD-10-CM

## 2020-09-03 DIAGNOSIS — M15.9 PRIMARY OSTEOARTHRITIS INVOLVING MULTIPLE JOINTS: ICD-10-CM

## 2020-09-03 DIAGNOSIS — N39.0 URINARY TRACT INFECTION WITHOUT HEMATURIA, SITE UNSPECIFIED: Primary | ICD-10-CM

## 2020-09-03 DIAGNOSIS — I10 ESSENTIAL HYPERTENSION: ICD-10-CM

## 2020-09-03 DIAGNOSIS — F51.01 PRIMARY INSOMNIA: ICD-10-CM

## 2020-09-03 DIAGNOSIS — G89.4 CHRONIC PAIN SYNDROME: ICD-10-CM

## 2020-09-03 DIAGNOSIS — Z11.59 ENCOUNTER FOR HEPATITIS C SCREENING TEST FOR LOW RISK PATIENT: ICD-10-CM

## 2020-09-03 LAB
BILIRUB BLD-MCNC: NEGATIVE MG/DL
CLARITY, POC: CLEAR
COLOR UR: YELLOW
GLUCOSE UR STRIP-MCNC: NEGATIVE MG/DL
KETONES UR QL: NEGATIVE
LEUKOCYTE EST, POC: NEGATIVE
NITRITE UR-MCNC: NEGATIVE MG/ML
PH UR: 7 [PH] (ref 5–8)
PROT UR STRIP-MCNC: NEGATIVE MG/DL
RBC # UR STRIP: NEGATIVE /UL
SP GR UR: 1.01 (ref 1–1.03)
UROBILINOGEN UR QL: NORMAL

## 2020-09-03 PROCEDURE — 99214 OFFICE O/P EST MOD 30 MIN: CPT | Performed by: INTERNAL MEDICINE

## 2020-09-03 PROCEDURE — 90732 PPSV23 VACC 2 YRS+ SUBQ/IM: CPT | Performed by: INTERNAL MEDICINE

## 2020-09-03 PROCEDURE — 81003 URINALYSIS AUTO W/O SCOPE: CPT | Performed by: INTERNAL MEDICINE

## 2020-09-03 PROCEDURE — G0009 ADMIN PNEUMOCOCCAL VACCINE: HCPCS | Performed by: INTERNAL MEDICINE

## 2020-09-03 RX ORDER — HYDROCODONE BITARTRATE AND ACETAMINOPHEN 7.5; 325 MG/1; MG/1
1 TABLET ORAL EVERY 8 HOURS PRN
Qty: 90 TABLET | Refills: 0 | Status: SHIPPED | OUTPATIENT
Start: 2020-09-03 | End: 2020-10-06 | Stop reason: SDUPTHER

## 2020-09-03 RX ORDER — AMITRIPTYLINE HYDROCHLORIDE 100 MG/1
100 TABLET, FILM COATED ORAL NIGHTLY
Qty: 90 TABLET | Refills: 1 | Status: SHIPPED | OUTPATIENT
Start: 2020-09-03 | End: 2021-02-22 | Stop reason: SDUPTHER

## 2020-09-03 RX ORDER — METOPROLOL TARTRATE 100 MG/1
100 TABLET ORAL 2 TIMES DAILY
Qty: 180 TABLET | Refills: 1 | Status: SHIPPED | OUTPATIENT
Start: 2020-09-03 | End: 2021-03-03 | Stop reason: SDUPTHER

## 2020-09-03 NOTE — PROGRESS NOTES
Subjective   Lula Decker is a 65 y.o. female.     Chief Complaint   Patient presents with   • Urinary Tract Infection   • Pain     med refill       History of Present Illness   Patient is concerned still possible UTIs.  Has had multiple UTIs in the past and wants to be sure she does not have any further issues.  She denies any burning, dysuria, and frequency that is new.    Follow-up for hypertension.  Currently, has been feeling well and asymptomatic without any headaches,vision changes, cough, chest pain, shortness of breath, swelling, focal neurologic deficit, memory loss or syncope.  Has been taking the medications regularly and adherent with the regimen of metoprolol tartrate 100 mg twice a day.  Denies medication side effects and no significant interval events.     Follow up with continued knee pain that intermittently flares and is severe in the right.  Has seen ortho and only options are injections or surgery but patient refuses these options at this time.    Follow-up with GERD and intermittent nausea lasting 5- 10 minutes then self resolves.  This has been a chronic issue for a long time and unchanged.  She continues with the pantoprazole 40 mg daily.    Continues to have chronic pain in the back, shoulders and knee.  Using the hydrocodone as needed and averages 2-3 times per day.  Denies side effects or issues with the problems or medications.    Patient with COPD and on oxygen nasal canula and using the inhaled medications is stable to slightly worsening and continues to see pulmonology.  She states she has not been using the Dulera as it is too expensive and all other preventive inhalers are too expensive and she will not use them.  I recommended that she continue to follow-up with a pulmonologist.    The following portions of the patient's history were reviewed and updated as appropriate: allergies, current medications, past family history, past medical history, past social history, past surgical  history and problem list.    Depression Screen:  PHQ-2/PHQ-9 Depression Screening 5/7/2020   Little interest or pleasure in doing things 0   Feeling down, depressed, or hopeless 0   Trouble falling or staying asleep, or sleeping too much 0   Feeling tired or having little energy 0   Poor appetite or overeating 0   Feeling bad about yourself - or that you are a failure or have let yourself or your family down 0   Trouble concentrating on things, such as reading the newspaper or watching television 0   Moving or speaking so slowly that other people could have noticed. Or the opposite - being so fidgety or restless that you have been moving around a lot more than usual 0   Thoughts that you would be better off dead, or of hurting yourself in some way 0   Total Score 0       Past Medical History:   Diagnosis Date   • Abnormal findings on diagnostic imaging of abdomen    • Alopecia areata    • Asthma    • Bleeding external hemorrhoids    • BMI 25.0-25.9,adult    • Calf cramp    • Carpal tunnel syndrome    • Chest pain    • Chest wall pain    • Constipation    • COPD (chronic obstructive pulmonary disease) (CMS/HCC)    • COPD with acute exacerbation (CMS/HCC)    • Discoloration of skin of foot    • Dyslipidemia    • Dysuria    • Eczema    • Encounter for immunization    • Esophageal reflux    • Fatty liver    • GERD (gastroesophageal reflux disease)    • Headache    • Hives of unknown origin    • Hyperactivity of bladder    • Hypertension    • IBS (irritable bowel syndrome)    • Incontinence of urine    • Insomnia    • Menopausal symptom    • Migraine    • MRSA exposure    • Muscle spasm    • Nocturia    • OA (osteoarthritis)    • Obstructive chronic bronchitis with acute exacerbation (CMS/HCC)    • Oral thrush    • Osteoarthritis of left knee    • Osteoporosis    • PAD (peripheral artery disease) (CMS/HCC)    • Sinusitis, acute    • Synovial cyst of popliteal space    • Tremor    • Tubular adenoma of colon    • Urinary  frequency    • Urinary urgency    • Urinary, incontinence, stress female    • UTI (urinary tract infection)        Past Surgical History:   Procedure Laterality Date   • BREAST LUMPECTOMY     • BREAST SURGERY     • COLONOSCOPY     • HYSTERECTOMY      Not due to cancer   • NECK SURGERY     • NEUROPLASTY      Median nerve at carpal tunnel.   • OOPHORECTOMY     • OTHER SURGICAL HISTORY  08/20/2014    Esophadogastroduodenoscopy - Gastritis    • TONSILLECTOMY     • TUBAL ABDOMINAL LIGATION         Family History   Problem Relation Age of Onset   • Heart failure Father        Social History     Socioeconomic History   • Marital status:      Spouse name: Not on file   • Number of children: Not on file   • Years of education: Not on file   • Highest education level: Not on file   Tobacco Use   • Smoking status: Current Every Day Smoker     Packs/day: 0.50     Types: Cigarettes   • Smokeless tobacco: Never Used   Substance and Sexual Activity   • Alcohol use: Yes   • Drug use: No   • Sexual activity: Defer       Current Outpatient Medications   Medication Sig Dispense Refill   • amitriptyline (ELAVIL) 100 MG tablet Take 1 tablet by mouth Every Night. 90 tablet 1   • doxylamine (UNISOM) 25 MG tablet Take 1 tablet by mouth Daily.     • DULERA 200-5 MCG/ACT inhaler INHALE 2 PUFFS BY MOUTH 2  TIMES A DAY. RINSE MOUTH  AFTER USE 39 g 1   • HYDROcodone-acetaminophen (NORCO) 7.5-325 MG per tablet Take 1 tablet by mouth Every 8 (Eight) Hours As Needed for Moderate Pain . 90 tablet 0   • hydrocortisone 2.5 % cream Apply 2.5 application topically to the appropriate area as directed 3 (Three) Times a Day.     • ipratropium-albuterol (DUO-NEB) 0.5-2.5 mg/3 ml nebulizer INHALE 1 VIAL VIA NEBULIZER FOUR TIMES DAILY 360 mL 3   • metoprolol tartrate (LOPRESSOR) 100 MG tablet Take 1 tablet by mouth 2 (Two) Times a Day. 180 tablet 1   • naproxen sodium (ALEVE) 220 MG tablet Take 220 mg by mouth 2 (Two) Times a Day As Needed.     •  "pantoprazole (PROTONIX) 40 MG EC tablet Take 1 tablet by mouth Daily. 90 tablet 1   • PROAIR  (90 Base) MCG/ACT inhaler INHALE 2 PUFFS EVERY 4 HOURS AS NEEDED 8.5 g 6   • triamcinolone (KENALOG) 0.1 % cream Apply  topically to the appropriate area as directed 2 (Two) Times a Day.       No current facility-administered medications for this visit.        Review of Systems   Constitutional: Negative for activity change, appetite change, fatigue, fever, unexpected weight gain and unexpected weight loss.   HENT: Negative for nosebleeds, rhinorrhea, trouble swallowing and voice change.    Eyes: Negative for visual disturbance.   Respiratory: Positive for shortness of breath. Negative for cough, chest tightness and wheezing.    Cardiovascular: Negative for chest pain, palpitations and leg swelling.   Gastrointestinal: Positive for GERD. Negative for abdominal pain, blood in stool, constipation, diarrhea, nausea, vomiting and indigestion.   Genitourinary: Negative for dysuria, frequency and hematuria.   Musculoskeletal: Positive for arthralgias and back pain. Negative for myalgias.   Skin: Negative for rash and bruise.   Neurological: Negative for dizziness, tremors, weakness, light-headedness, numbness, headache and memory problem.   Hematological: Negative for adenopathy. Does not bruise/bleed easily.   Psychiatric/Behavioral: Negative for sleep disturbance and depressed mood. The patient is not nervous/anxious.        Objective   /80 (BP Location: Left arm, Patient Position: Sitting, Cuff Size: Adult)   Pulse 91   Temp 98 °F (36.7 °C) (Temporal)   Ht 167.6 cm (65.98\")   Wt 72.6 kg (160 lb)   SpO2 97%   BMI 25.84 kg/m²     Physical Exam   Constitutional: She is oriented to person, place, and time. She appears well-developed and well-nourished. No distress.   HENT:   Head: Normocephalic and atraumatic.   Right Ear: External ear normal.   Left Ear: External ear normal.   Nose: Nose normal.   Mouth/Throat: " Oropharynx is clear and moist.   Eyes: Pupils are equal, round, and reactive to light. Conjunctivae and EOM are normal.   Neck: Normal range of motion. Neck supple. No tracheal deviation present. No thyromegaly present.   Cardiovascular: Normal rate, regular rhythm, normal heart sounds and intact distal pulses. Exam reveals no gallop and no friction rub.   No murmur heard.  Pulmonary/Chest: Effort normal. No respiratory distress. She has wheezes.   On oxygen 3L per nasal canula.  End bibasilar expiratory wheeze   Abdominal: Soft. Bowel sounds are normal. She exhibits no mass. There is no tenderness. There is no guarding.   Musculoskeletal: Normal range of motion. She exhibits no edema.   Lymphadenopathy:     She has no cervical adenopathy.   Neurological: She is alert and oriented to person, place, and time. She displays normal reflexes. She exhibits normal muscle tone.   Skin: Skin is warm and dry. Capillary refill takes less than 2 seconds. No rash noted. She is not diaphoretic.   Psychiatric: She has a normal mood and affect. Her behavior is normal. Judgment and thought content normal.   Nursing note and vitals reviewed.      Recent Results (from the past 2016 hour(s))   Urine Culture - Urine, Urine, Clean Catch    Collection Time: 06/17/20 12:00 AM   Result Value Ref Range    Urine Culture Final report     Result 1 Comment    POCT urinalysis dipstick, automated    Collection Time: 06/17/20  2:11 PM   Result Value Ref Range    Color Yellow Yellow, Straw, Dark Yellow, Sepideh    Clarity, UA Clear Clear    Specific Gravity  1.010 1.005 - 1.030    pH, Urine 6.5 5.0 - 8.0    Leukocytes Negative Negative    Nitrite, UA Negative Negative    Protein, POC Negative Negative mg/dL    Glucose, UA Negative Negative, 1000 mg/dL (3+) mg/dL    Ketones, UA Negative Negative    Urobilinogen, UA Normal Normal    Bilirubin Negative Negative    Blood, UA Negative Negative   POCT urinalysis dipstick, automated    Collection Time:  09/03/20  2:11 PM   Result Value Ref Range    Color Yellow Yellow, Straw, Dark Yellow, Sepideh    Clarity, UA Clear Clear    Specific Gravity  1.010 1.005 - 1.030    pH, Urine 7.0 5.0 - 8.0    Leukocytes Negative Negative    Nitrite, UA Negative Negative    Protein, POC Negative Negative mg/dL    Glucose, UA Negative Negative, 1000 mg/dL (3+) mg/dL    Ketones, UA Negative Negative    Urobilinogen, UA Normal Normal    Bilirubin Negative Negative    Blood, UA Negative Negative     Assessment/Plan   Lula was seen today for urinary tract infection and pain.    Diagnoses and all orders for this visit:    Urinary tract infection without hematuria, site unspecified  -     POCT urinalysis dipstick, automated    Primary insomnia  -     amitriptyline (ELAVIL) 100 MG tablet; Take 1 tablet by mouth Every Night.    Essential hypertension  -     metoprolol tartrate (LOPRESSOR) 100 MG tablet; Take 1 tablet by mouth 2 (Two) Times a Day.  -     Comprehensive Metabolic Panel  -     Lipid Panel    Chronic pain syndrome  -     HYDROcodone-acetaminophen (NORCO) 7.5-325 MG per tablet; Take 1 tablet by mouth Every 8 (Eight) Hours As Needed for Moderate Pain .  -     ToxASSURE Select 13 (MW) - Urine, Clean Catch    Primary osteoarthritis involving multiple joints  -     HYDROcodone-acetaminophen (NORCO) 7.5-325 MG per tablet; Take 1 tablet by mouth Every 8 (Eight) Hours As Needed for Moderate Pain .    Encounter for hepatitis C screening test for low risk patient  -     Hepatitis C Antibody    Immunization due  -     Pneumococcal Polysaccharide Vaccine 23-Valent Greater Than or Equal To 3yo Subcutaneous / IM    UTI appears to be resolved no symptoms and no abnormalities on her urinalysis at this time.  We will continue to observe.    Insomnia stable continue the amitriptyline 100 mg nightly.    Hypertension is well controlled at this time continue metoprolol unchanged.  Will check labs including a CMP and lipid panel.    Chronic pain  syndrome with the arthritis will check the tox assure studies today, continue current medication unchanged.  ROGER run and reviewed from 7/20/2020.  Risks of the medication include but are not limited to fatigue, somnolence, increased risk of falls, constipation, allergic reaction, dependence, and addiction.    Immunizations discussed we will give the Pneumovax 23 today and then she will be up-to-date on her pneumonia vaccinations.

## 2020-09-07 LAB
ALBUMIN SERPL-MCNC: 4.8 G/DL (ref 3.8–4.8)
ALBUMIN/GLOB SERPL: 1.7 {RATIO} (ref 1.2–2.2)
ALP SERPL-CCNC: 91 IU/L (ref 39–117)
ALT SERPL-CCNC: 16 IU/L (ref 0–32)
AST SERPL-CCNC: 24 IU/L (ref 0–40)
BILIRUB SERPL-MCNC: 0.3 MG/DL (ref 0–1.2)
BUN SERPL-MCNC: 7 MG/DL (ref 8–27)
BUN/CREAT SERPL: 10 (ref 12–28)
CALCIUM SERPL-MCNC: 9.1 MG/DL (ref 8.7–10.3)
CHLORIDE SERPL-SCNC: 95 MMOL/L (ref 96–106)
CHOLEST SERPL-MCNC: 283 MG/DL (ref 100–199)
CO2 SERPL-SCNC: 30 MMOL/L (ref 20–29)
CREAT SERPL-MCNC: 0.73 MG/DL (ref 0.57–1)
DRUGS UR: NORMAL
GLOBULIN SER CALC-MCNC: 2.9 G/DL (ref 1.5–4.5)
GLUCOSE SERPL-MCNC: 80 MG/DL (ref 65–99)
HCV AB S/CO SERPL IA: <0.1 S/CO RATIO (ref 0–0.9)
HDLC SERPL-MCNC: 56 MG/DL
LDLC SERPL CALC-MCNC: 176 MG/DL (ref 0–99)
POTASSIUM SERPL-SCNC: 4.5 MMOL/L (ref 3.5–5.2)
PROT SERPL-MCNC: 7.7 G/DL (ref 6–8.5)
SODIUM SERPL-SCNC: 140 MMOL/L (ref 134–144)
TRIGL SERPL-MCNC: 267 MG/DL (ref 0–149)
VLDLC SERPL CALC-MCNC: 51 MG/DL (ref 5–40)

## 2020-09-16 ENCOUNTER — TELEPHONE (OUTPATIENT)
Dept: FAMILY MEDICINE CLINIC | Facility: CLINIC | Age: 65
End: 2020-09-16

## 2020-10-06 DIAGNOSIS — M15.9 PRIMARY OSTEOARTHRITIS INVOLVING MULTIPLE JOINTS: ICD-10-CM

## 2020-10-06 DIAGNOSIS — G89.4 CHRONIC PAIN SYNDROME: ICD-10-CM

## 2020-10-08 RX ORDER — HYDROCODONE BITARTRATE AND ACETAMINOPHEN 7.5; 325 MG/1; MG/1
1 TABLET ORAL EVERY 8 HOURS PRN
Qty: 90 TABLET | Refills: 0 | Status: SHIPPED | OUTPATIENT
Start: 2020-10-08 | End: 2020-11-05 | Stop reason: SDUPTHER

## 2020-11-02 NOTE — TELEPHONE ENCOUNTER
Digital Medicine: Clinician Follow-Up    Called patient for routine follow up on blood pressure. Patient reports adherence to medication regimen daily and denies missed doses. Patient denies hypotensive s/s (lightheadedness, dizziness, nausea, fatigue); patient denies hypertensive s/s (SOB, CP, severe headaches, changes in vision, dizziness, fatigue).  Patient stated she believed that the lower BP recorded weeks ago was possibly because of the muscle relaxants she was taking. Explained we would not begin weaning off BP meds unless she was experiencing low BP s/s and especially if she was still taking phentermine for weight loss because of the CV risk associated with it. Patient is also getting estrogen injections which may contribute to elevated bp in some patients.    The history is provided by the patient.   Follow-up reason(s): routine follow up.     Hypertension    Readings are trending up due to lifestyle change.    Patient is not experiencing signs/symptoms of hypotension.  Patient is not experiencing signs/symptoms of hypertension.            Last 5 Patient Entered Readings                                      Current 30 Day Average: 111/74     Recent Readings 10/28/2020 10/28/2020 10/26/2020 10/26/2020 10/24/2020    SBP (mmHg) 115 115 110 115 115    DBP (mmHg) 78 78 77 78 78    Pulse 89 89 83 89 89                 Depression Screening  Daphne Bocanegra screened negative on the depression screening.     Sleep Apnea Screening  Patient not previously diagnosed with MARLEN       Medication Affordability Screening  Patient did not answer the medication affordability questionnaires. Patient is currently not having problems affording medications    Medication Adherence-Medication adherence was assessed.          ASSESSMENT(S)  Patients BP average is 111/74 mmHg, which is at goal. Patient's BP goal is less than or equal to 130/80.    Hypertension Plan  Continue current therapy.  Continue current diet/physical  LOV 11/7 jordy on desk   activity routine.       Addressed patient questions and patient has my contact information if needed prior to next outreach. Patient verbalizes understanding.      Explained to the patient that the Digital Medicine team is not available for emergencies. Advised patient call [facility] ([phone] or [phone]) or [phone] if needed.            There are no preventive care reminders to display for this patient.  There are no preventive care reminders to display for this patient.      Hypertension Medications             hydroCHLOROthiazide (HYDRODIURIL) 12.5 MG Tab TAKE 1 TABLET BY MOUTH EVERY DAY    lisinopriL (PRINIVIL,ZESTRIL) 20 MG tablet TAKE 1 TABLET BY MOUTH EVERY DAY

## 2020-11-05 DIAGNOSIS — M15.9 PRIMARY OSTEOARTHRITIS INVOLVING MULTIPLE JOINTS: ICD-10-CM

## 2020-11-05 DIAGNOSIS — G89.4 CHRONIC PAIN SYNDROME: ICD-10-CM

## 2020-11-05 RX ORDER — HYDROCODONE BITARTRATE AND ACETAMINOPHEN 7.5; 325 MG/1; MG/1
1 TABLET ORAL EVERY 8 HOURS PRN
Qty: 90 TABLET | Refills: 0 | Status: SHIPPED | OUTPATIENT
Start: 2020-11-05 | End: 2020-12-07 | Stop reason: SDUPTHER

## 2020-12-02 RX ORDER — IPRATROPIUM BROMIDE AND ALBUTEROL SULFATE 2.5; .5 MG/3ML; MG/3ML
SOLUTION RESPIRATORY (INHALATION)
Qty: 360 ML | Refills: 3 | Status: SHIPPED | OUTPATIENT
Start: 2020-12-02 | End: 2021-01-14 | Stop reason: SDUPTHER

## 2020-12-07 ENCOUNTER — OFFICE VISIT (OUTPATIENT)
Dept: FAMILY MEDICINE CLINIC | Facility: CLINIC | Age: 65
End: 2020-12-07

## 2020-12-07 VITALS
BODY MASS INDEX: 24.91 KG/M2 | DIASTOLIC BLOOD PRESSURE: 90 MMHG | SYSTOLIC BLOOD PRESSURE: 132 MMHG | OXYGEN SATURATION: 99 % | HEART RATE: 88 BPM | TEMPERATURE: 97.6 F | HEIGHT: 66 IN | WEIGHT: 155 LBS

## 2020-12-07 DIAGNOSIS — R39.81 URINARY INCONTINENCE, FUNCTIONAL: ICD-10-CM

## 2020-12-07 DIAGNOSIS — M15.9 PRIMARY OSTEOARTHRITIS INVOLVING MULTIPLE JOINTS: ICD-10-CM

## 2020-12-07 DIAGNOSIS — I10 ESSENTIAL HYPERTENSION: ICD-10-CM

## 2020-12-07 DIAGNOSIS — N31.8 HYPERACTIVITY OF BLADDER: ICD-10-CM

## 2020-12-07 DIAGNOSIS — N39.0 URINARY TRACT INFECTION WITHOUT HEMATURIA, SITE UNSPECIFIED: Primary | ICD-10-CM

## 2020-12-07 DIAGNOSIS — K21.9 GASTROESOPHAGEAL REFLUX DISEASE WITHOUT ESOPHAGITIS: ICD-10-CM

## 2020-12-07 DIAGNOSIS — G89.4 CHRONIC PAIN SYNDROME: ICD-10-CM

## 2020-12-07 LAB
BILIRUB BLD-MCNC: NEGATIVE MG/DL
CLARITY, POC: CLEAR
COLOR UR: YELLOW
GLUCOSE UR STRIP-MCNC: NEGATIVE MG/DL
KETONES UR QL: NEGATIVE
LEUKOCYTE EST, POC: NEGATIVE
NITRITE UR-MCNC: NEGATIVE MG/ML
PH UR: 6.5 [PH] (ref 5–8)
PROT UR STRIP-MCNC: NEGATIVE MG/DL
RBC # UR STRIP: NEGATIVE /UL
SP GR UR: 1.01 (ref 1–1.03)
UROBILINOGEN UR QL: NORMAL

## 2020-12-07 PROCEDURE — 99214 OFFICE O/P EST MOD 30 MIN: CPT | Performed by: INTERNAL MEDICINE

## 2020-12-07 PROCEDURE — 81003 URINALYSIS AUTO W/O SCOPE: CPT | Performed by: INTERNAL MEDICINE

## 2020-12-07 RX ORDER — OXYBUTYNIN CHLORIDE 5 MG/1
5 TABLET, EXTENDED RELEASE ORAL DAILY
Qty: 30 TABLET | Refills: 3 | Status: SHIPPED | OUTPATIENT
Start: 2020-12-07 | End: 2021-09-23

## 2020-12-07 RX ORDER — HYDROCODONE BITARTRATE AND ACETAMINOPHEN 7.5; 325 MG/1; MG/1
1 TABLET ORAL EVERY 8 HOURS PRN
Qty: 90 TABLET | Refills: 0 | Status: SHIPPED | OUTPATIENT
Start: 2020-12-07 | End: 2021-01-06 | Stop reason: SDUPTHER

## 2020-12-07 NOTE — PROGRESS NOTES
Subjective   Lula Decker is a 65 y.o. female.     Chief Complaint   Patient presents with   • chronic pain syndrome   • Nausea   • Urinary Incontinence       History of Present Illness   Patient is concerned still possible UTIs.  Has had multiple UTIs in the past and wants to be sure she does not have any further issues.  She denies any burning, dysuria, and frequency that is new.  Has urinary incontinence for a long time but some pressure on urination at times.  Feels like has feeling to need to urinate all the time and has urinary incontinence.     Follow-up for hypertension.  Currently, has been feeling well and asymptomatic without any headaches,vision changes, cough, chest pain, shortness of breath, swelling, focal neurologic deficit, memory loss or syncope.  Has been taking the medications regularly and adherent with the regimen of metoprolol tartrate 100 mg twice a day.  Denies medication side effects and no significant interval events.      Follow up with continued knee pain that intermittently flares and is severe in the right.  Has seen ortho and only options are injections or surgery but patient continues to refuse these options at this time.     Follow-up with GERD and intermittent nausea lasting 5- 10 minutes then self resolves.  This has been a chronic issue for a long time and unchanged.  She continues with the pantoprazole 40 mg daily.     Continues to have chronic pain in the back, shoulders and knee.  Using the hydrocodone as needed and averages 2-3 times per day.  Denies side effects or issues with the problems or medications.     Patient with COPD and on oxygen nasal canula and using the inhaled medications is stable to slightly worsening and continues to see pulmonology.  She states she has not been using the Dulera as it is too expensive and all other preventive inhalers are too expensive and she will not use them.  I recommended that she continue to follow-up with a pulmonologist.    The  following portions of the patient's history were reviewed and updated as appropriate: allergies, current medications, past family history, past medical history, past social history, past surgical history and problem list.    Depression Screen:  PHQ-2/PHQ-9 Depression Screening 5/7/2020   Little interest or pleasure in doing things 0   Feeling down, depressed, or hopeless 0   Trouble falling or staying asleep, or sleeping too much 0   Feeling tired or having little energy 0   Poor appetite or overeating 0   Feeling bad about yourself - or that you are a failure or have let yourself or your family down 0   Trouble concentrating on things, such as reading the newspaper or watching television 0   Moving or speaking so slowly that other people could have noticed. Or the opposite - being so fidgety or restless that you have been moving around a lot more than usual 0   Thoughts that you would be better off dead, or of hurting yourself in some way 0   Total Score 0       Past Medical History:   Diagnosis Date   • Abnormal findings on diagnostic imaging of abdomen    • Alopecia areata    • Asthma    • Bleeding external hemorrhoids    • BMI 25.0-25.9,adult    • Calf cramp    • Carpal tunnel syndrome    • Chest pain    • Chest wall pain    • Constipation    • COPD (chronic obstructive pulmonary disease) (CMS/HCC)    • COPD with acute exacerbation (CMS/HCC)    • Discoloration of skin of foot    • Dyslipidemia    • Dysuria    • Eczema    • Encounter for immunization    • Esophageal reflux    • Fatty liver    • GERD (gastroesophageal reflux disease)    • Headache    • Hives of unknown origin    • Hyperactivity of bladder    • Hypertension    • IBS (irritable bowel syndrome)    • Incontinence of urine    • Insomnia    • Menopausal symptom    • Migraine    • MRSA exposure    • Muscle spasm    • Nocturia    • OA (osteoarthritis)    • Obstructive chronic bronchitis with acute exacerbation (CMS/HCC)    • Oral thrush    •  Osteoarthritis of left knee    • Osteoporosis    • PAD (peripheral artery disease) (CMS/HCC)    • Sinusitis, acute    • Synovial cyst of popliteal space    • Tremor    • Tubular adenoma of colon    • Urinary frequency    • Urinary urgency    • Urinary, incontinence, stress female    • UTI (urinary tract infection)        Past Surgical History:   Procedure Laterality Date   • BREAST LUMPECTOMY     • BREAST SURGERY     • COLONOSCOPY     • HYSTERECTOMY      Not due to cancer   • NECK SURGERY     • NEUROPLASTY      Median nerve at carpal tunnel.   • OOPHORECTOMY     • OTHER SURGICAL HISTORY  08/20/2014    Esophadogastroduodenoscopy - Gastritis    • TONSILLECTOMY     • TUBAL ABDOMINAL LIGATION         Family History   Problem Relation Age of Onset   • Heart failure Father        Social History     Socioeconomic History   • Marital status:      Spouse name: Not on file   • Number of children: Not on file   • Years of education: Not on file   • Highest education level: Not on file   Tobacco Use   • Smoking status: Current Every Day Smoker     Packs/day: 0.50     Types: Cigarettes   • Smokeless tobacco: Never Used   Substance and Sexual Activity   • Alcohol use: Yes   • Drug use: No   • Sexual activity: Defer       Current Outpatient Medications   Medication Sig Dispense Refill   • amitriptyline (ELAVIL) 100 MG tablet Take 1 tablet by mouth Every Night. 90 tablet 1   • doxylamine (UNISOM) 25 MG tablet Take 1 tablet by mouth Daily.     • HYDROcodone-acetaminophen (NORCO) 7.5-325 MG per tablet Take 1 tablet by mouth Every 8 (Eight) Hours As Needed for Moderate Pain . 90 tablet 0   • hydrocortisone 2.5 % cream Apply 2.5 application topically to the appropriate area as directed 3 (Three) Times a Day.     • ipratropium-albuterol (DUO-NEB) 0.5-2.5 mg/3 ml nebulizer USE 1 VIAL VIA NEBULIZER FOUR TIMES DAILY 360 mL 3   • metoprolol tartrate (LOPRESSOR) 100 MG tablet Take 1 tablet by mouth 2 (Two) Times a Day. 180 tablet 1  "  • naproxen sodium (ALEVE) 220 MG tablet Take 220 mg by mouth 2 (Two) Times a Day As Needed.     • pantoprazole (PROTONIX) 40 MG EC tablet Take 1 tablet by mouth Daily. 90 tablet 1   • PROAIR  (90 Base) MCG/ACT inhaler INHALE 2 PUFFS EVERY 4 HOURS AS NEEDED 8.5 g 6   • triamcinolone (KENALOG) 0.1 % cream Apply  topically to the appropriate area as directed 2 (Two) Times a Day.     • oxybutynin XL (DITROPAN-XL) 5 MG 24 hr tablet Take 1 tablet by mouth Daily. 30 tablet 3     No current facility-administered medications for this visit.        Review of Systems   Constitutional: Negative for activity change, appetite change, fatigue, fever, unexpected weight gain and unexpected weight loss.   HENT: Negative for nosebleeds, rhinorrhea, trouble swallowing and voice change.    Eyes: Negative for visual disturbance.   Respiratory: Negative for cough, chest tightness, shortness of breath and wheezing.         Chronic short of breath and cough   Cardiovascular: Negative for chest pain, palpitations and leg swelling.   Gastrointestinal: Positive for nausea and GERD. Negative for abdominal pain, blood in stool, constipation, diarrhea, vomiting and indigestion.   Genitourinary: Negative for dysuria, frequency and hematuria.   Musculoskeletal: Positive for arthralgias and back pain. Negative for myalgias.   Skin: Negative for rash and bruise.   Neurological: Negative for dizziness, tremors, weakness, light-headedness, numbness, headache and memory problem.   Hematological: Negative for adenopathy. Does not bruise/bleed easily.   Psychiatric/Behavioral: Negative for sleep disturbance and depressed mood. The patient is not nervous/anxious.        Objective   /90 (BP Location: Right arm, Patient Position: Sitting, Cuff Size: Adult)   Pulse 88   Temp 97.6 °F (36.4 °C) (Temporal)   Ht 167.6 cm (65.98\")   Wt 70.3 kg (155 lb)   SpO2 99%   BMI 25.03 kg/m²     Physical Exam  Vitals signs and nursing note reviewed. "   Constitutional:       General: She is not in acute distress.     Appearance: She is well-developed. She is not diaphoretic.   HENT:      Head: Normocephalic and atraumatic.      Right Ear: External ear normal.      Left Ear: External ear normal.      Nose: Nose normal.   Eyes:      Conjunctiva/sclera: Conjunctivae normal.      Pupils: Pupils are equal, round, and reactive to light.   Neck:      Musculoskeletal: Normal range of motion and neck supple.      Thyroid: No thyromegaly.      Trachea: No tracheal deviation.   Cardiovascular:      Rate and Rhythm: Normal rate and regular rhythm.      Heart sounds: Normal heart sounds. No murmur. No friction rub. No gallop.    Pulmonary:      Effort: Pulmonary effort is normal. No respiratory distress.      Breath sounds: Normal breath sounds.      Comments: On oxygen 3L per nasal canula.  End bibasilar expiratory wheeze   Abdominal:      General: Bowel sounds are normal.      Palpations: Abdomen is soft. There is no mass.      Tenderness: There is no abdominal tenderness. There is no guarding.   Musculoskeletal: Normal range of motion.   Lymphadenopathy:      Cervical: No cervical adenopathy.   Skin:     General: Skin is warm and dry.      Capillary Refill: Capillary refill takes less than 2 seconds.      Findings: No rash.   Neurological:      Mental Status: She is alert and oriented to person, place, and time.      Motor: No abnormal muscle tone.      Deep Tendon Reflexes: Reflexes normal.   Psychiatric:         Behavior: Behavior normal.         Thought Content: Thought content normal.         Judgment: Judgment normal.         Recent Results (from the past 2016 hour(s))   POCT urinalysis dipstick, automated    Collection Time: 12/07/20 11:42 AM    Specimen: Urine   Result Value Ref Range    Color Yellow Yellow, Straw, Dark Yellow, Sepideh    Clarity, UA Clear Clear    Specific Gravity  1.010 1.005 - 1.030    pH, Urine 6.5 5.0 - 8.0    Leukocytes Negative Negative     Nitrite, UA Negative Negative    Protein, POC Negative Negative mg/dL    Glucose, UA Negative Negative, 1000 mg/dL (3+) mg/dL    Ketones, UA Negative Negative    Urobilinogen, UA Normal Normal    Bilirubin Negative Negative    Blood, UA Negative Negative     Assessment/Plan   Diagnoses and all orders for this visit:    1. Urinary tract infection without hematuria, site unspecified (Primary)  -     POCT urinalysis dipstick, automated    2. Chronic pain syndrome  -     HYDROcodone-acetaminophen (NORCO) 7.5-325 MG per tablet; Take 1 tablet by mouth Every 8 (Eight) Hours As Needed for Moderate Pain .  Dispense: 90 tablet; Refill: 0    3. Primary osteoarthritis involving multiple joints  -     HYDROcodone-acetaminophen (NORCO) 7.5-325 MG per tablet; Take 1 tablet by mouth Every 8 (Eight) Hours As Needed for Moderate Pain .  Dispense: 90 tablet; Refill: 0    4. Hyperactivity of bladder  -     oxybutynin XL (DITROPAN-XL) 5 MG 24 hr tablet; Take 1 tablet by mouth Daily.  Dispense: 30 tablet; Refill: 3    5. Urinary incontinence, functional  -     oxybutynin XL (DITROPAN-XL) 5 MG 24 hr tablet; Take 1 tablet by mouth Daily.  Dispense: 30 tablet; Refill: 3    6. Essential hypertension    7. Gastroesophageal reflux disease without esophagitis      Chronic pain syndrome which is stable and unchanged.  We will continue current medication unchanged.  Roel reviewed and discussed with patient.  We discussed the risks of the medications including but not limited to constipation, somnolence, fatigue, weakness, driving under the influence, increased risk for falls, dependence, addiction etc.  Patient is understanding.  Hypertension borderline at this time we will continue the current medication.  Noted to have a overactive bladder with urinary incontinence discussed with patient this is likely more functional and structural but we will try the oxybutynin.  If she does not have any improvement then I would stop the medication and we  discussed the possible side effects of the medication including dry mouth, fatigue, memory changes etc.

## 2020-12-28 ENCOUNTER — OFFICE VISIT (OUTPATIENT)
Dept: FAMILY MEDICINE CLINIC | Facility: CLINIC | Age: 65
End: 2020-12-28

## 2020-12-28 VITALS
TEMPERATURE: 97.1 F | WEIGHT: 155 LBS | HEART RATE: 94 BPM | DIASTOLIC BLOOD PRESSURE: 88 MMHG | BODY MASS INDEX: 24.91 KG/M2 | HEIGHT: 66 IN | SYSTOLIC BLOOD PRESSURE: 142 MMHG | OXYGEN SATURATION: 97 %

## 2020-12-28 DIAGNOSIS — R21 RASH OF FOOT: ICD-10-CM

## 2020-12-28 DIAGNOSIS — B02.9 HERPES ZOSTER WITHOUT COMPLICATION: Primary | ICD-10-CM

## 2020-12-28 PROCEDURE — 99214 OFFICE O/P EST MOD 30 MIN: CPT | Performed by: INTERNAL MEDICINE

## 2020-12-28 RX ORDER — VALACYCLOVIR HYDROCHLORIDE 1 G/1
1000 TABLET, FILM COATED ORAL 3 TIMES DAILY
Qty: 21 TABLET | Refills: 0 | Status: SHIPPED | OUTPATIENT
Start: 2020-12-28 | End: 2020-12-29

## 2020-12-28 RX ORDER — TRIAMCINOLONE ACETONIDE 1 MG/G
CREAM TOPICAL 2 TIMES DAILY
Qty: 45 G | Refills: 0 | Status: SHIPPED | OUTPATIENT
Start: 2020-12-28

## 2020-12-28 NOTE — PROGRESS NOTES
Subjective   Lula Decker is a 65 y.o. female.     Chief Complaint   Patient presents with   • Sore     bilateral feet       History of Present Illness   Answers for HPI/ROS submitted by the patient on 12/26/2020   What is the primary reason for your visit?: Other  Please describe your symptoms.: infections on toes  Have you had these symptoms before?: No  How long have you been having these symptoms?: Greater than 2 weeks    3-4 months with a bump on the base of the 4th toe then in the last 2 weeks has been having small bumps the itch in foot distally in base of the toes and on the toes.  No pain.  Has noted nearly every day a new little bump forming and one has opened and scabbed.    The following portions of the patient's history were reviewed and updated as appropriate: allergies, current medications, past family history, past medical history, past social history, past surgical history and problem list.    Depression Screen:  PHQ-2/PHQ-9 Depression Screening 5/7/2020   Little interest or pleasure in doing things 0   Feeling down, depressed, or hopeless 0   Trouble falling or staying asleep, or sleeping too much 0   Feeling tired or having little energy 0   Poor appetite or overeating 0   Feeling bad about yourself - or that you are a failure or have let yourself or your family down 0   Trouble concentrating on things, such as reading the newspaper or watching television 0   Moving or speaking so slowly that other people could have noticed. Or the opposite - being so fidgety or restless that you have been moving around a lot more than usual 0   Thoughts that you would be better off dead, or of hurting yourself in some way 0   Total Score 0       Past Medical History:   Diagnosis Date   • Abnormal findings on diagnostic imaging of abdomen    • Alopecia areata    • Asthma    • Bleeding external hemorrhoids    • BMI 25.0-25.9,adult    • Calf cramp    • Carpal tunnel syndrome    • Chest pain    • Chest wall pain    •  Constipation    • COPD (chronic obstructive pulmonary disease) (CMS/HCC)    • COPD with acute exacerbation (CMS/[facility])    • Discoloration of skin of foot    • Dyslipidemia    • Dysuria    • Eczema    • Encounter for immunization    • Esophageal reflux    • Fatty liver    • GERD (gastroesophageal reflux disease)    • Headache    • Hives of unknown origin    • Hyperactivity of bladder    • Hypertension    • IBS (irritable bowel syndrome)    • Incontinence of urine    • Insomnia    • Menopausal symptom    • Migraine    • MRSA exposure    • Muscle spasm    • Nocturia    • OA (osteoarthritis)    • Obstructive chronic bronchitis with acute exacerbation (CMS/[facility])    • Oral thrush    • Osteoarthritis of left knee    • Osteoporosis    • PAD (peripheral artery disease) (CMS/[facility])    • Sinusitis, acute    • Synovial cyst of popliteal space    • Tremor    • Tubular adenoma of colon    • Urinary frequency    • Urinary urgency    • Urinary, incontinence, stress female    • UTI (urinary tract infection)        Past Surgical History:   Procedure Laterality Date   • BREAST LUMPECTOMY     • BREAST SURGERY     • COLONOSCOPY     • HYSTERECTOMY      Not due to cancer   • NECK SURGERY     • NEUROPLASTY      Median nerve at carpal tunnel.   • OOPHORECTOMY     • OTHER SURGICAL HISTORY  08/20/2014    Esophadogastroduodenoscopy - Gastritis    • TONSILLECTOMY     • TUBAL ABDOMINAL LIGATION         Family History   Problem Relation Age of Onset   • Heart failure Father        Social History     Socioeconomic History   • Marital status:      Spouse name: Not on file   • Number of children: Not on file   • Years of education: Not on file   • Highest education level: Not on file   Tobacco Use   • Smoking status: Current Every Day Smoker     Packs/day: 0.50     Types: Cigarettes   • Smokeless tobacco: Never Used   Substance and Sexual Activity   • Alcohol use: Yes   • Drug use: No   • Sexual activity: Defer       Current Outpatient Medications    Medication Sig Dispense Refill   • amitriptyline (ELAVIL) 100 MG tablet Take 1 tablet by mouth Every Night. 90 tablet 1   • doxylamine (UNISOM) 25 MG tablet Take 1 tablet by mouth Daily.     • HYDROcodone-acetaminophen (NORCO) 7.5-325 MG per tablet Take 1 tablet by mouth Every 8 (Eight) Hours As Needed for Moderate Pain . 90 tablet 0   • hydrocortisone 2.5 % cream Apply 2.5 application topically to the appropriate area as directed 3 (Three) Times a Day.     • ipratropium-albuterol (DUO-NEB) 0.5-2.5 mg/3 ml nebulizer USE 1 VIAL VIA NEBULIZER FOUR TIMES DAILY 360 mL 3   • metoprolol tartrate (LOPRESSOR) 100 MG tablet Take 1 tablet by mouth 2 (Two) Times a Day. 180 tablet 1   • naproxen sodium (ALEVE) 220 MG tablet Take 220 mg by mouth 2 (Two) Times a Day As Needed.     • oxybutynin XL (DITROPAN-XL) 5 MG 24 hr tablet Take 1 tablet by mouth Daily. 30 tablet 3   • pantoprazole (PROTONIX) 40 MG EC tablet Take 1 tablet by mouth Daily. 90 tablet 1   • PROAIR  (90 Base) MCG/ACT inhaler INHALE 2 PUFFS EVERY 4 HOURS AS NEEDED 8.5 g 6   • triamcinolone (KENALOG) 0.1 % cream Apply  topically to the appropriate area as directed 2 (Two) Times a Day. 45 g 0   • valACYclovir (Valtrex) 1000 MG tablet Take 1 tablet by mouth 3 (Three) Times a Day. 21 tablet 0     No current facility-administered medications for this visit.        Review of Systems   Constitutional: Negative for activity change, appetite change, fatigue, fever, unexpected weight gain and unexpected weight loss.   HENT: Negative for nosebleeds, rhinorrhea, trouble swallowing and voice change.    Eyes: Negative for visual disturbance.   Respiratory: Negative for cough, chest tightness, shortness of breath and wheezing.         Chronic short of breath and cough    Cardiovascular: Negative for chest pain, palpitations and leg swelling.   Gastrointestinal: Positive for GERD. Negative for abdominal pain, blood in stool, constipation, diarrhea, nausea, vomiting and  "indigestion.   Genitourinary: Negative for dysuria, frequency and hematuria.   Musculoskeletal: Positive for arthralgias and back pain. Negative for myalgias.        Sores of toes   Skin: Positive for rash. Negative for bruise.   Neurological: Negative for dizziness, tremors, weakness, light-headedness, numbness, headache and memory problem.   Hematological: Negative for adenopathy. Does not bruise/bleed easily.   Psychiatric/Behavioral: Negative for sleep disturbance and depressed mood. The patient is not nervous/anxious.        Objective   /88 (BP Location: Left arm, Patient Position: Sitting, Cuff Size: Adult)   Pulse 94   Temp 97.1 °F (36.2 °C) (Temporal)   Ht 167.6 cm (65.98\")   Wt 70.3 kg (155 lb)   SpO2 97%   BMI 25.03 kg/m²     Physical Exam  Vitals signs and nursing note reviewed.   Constitutional:       General: She is not in acute distress.     Appearance: She is well-developed. She is not diaphoretic.   HENT:      Head: Normocephalic and atraumatic.      Right Ear: External ear normal.      Left Ear: External ear normal.      Nose: Nose normal.   Eyes:      Conjunctiva/sclera: Conjunctivae normal.      Pupils: Pupils are equal, round, and reactive to light.   Neck:      Musculoskeletal: Normal range of motion and neck supple.      Thyroid: No thyromegaly.      Trachea: No tracheal deviation.   Cardiovascular:      Rate and Rhythm: Normal rate and regular rhythm.      Heart sounds: Normal heart sounds. No murmur. No friction rub. No gallop.    Pulmonary:      Effort: Pulmonary effort is normal. No respiratory distress.      Breath sounds: Normal breath sounds.      Comments: On oxygen 3L per nasal canula.  End bibasilar expiratory wheeze   Abdominal:      General: Bowel sounds are normal.      Palpations: Abdomen is soft. There is no mass.      Tenderness: There is no abdominal tenderness. There is no guarding.   Musculoskeletal: Normal range of motion.   Lymphadenopathy:      Cervical: No " cervical adenopathy.   Skin:     Capillary Refill: Capillary refill takes less than 2 seconds.      Findings: Rash present.      Comments: Patient with a papular rash at the base of the third and fourth toe that extends up to the toes to include the first second third and fourth toes.  1 scabbed lesion at the base of the fourth toe was noted but only on the dorsal aspect.  There are no rash on the plantar aspect of the toes or foot.  No rash on the left foot was noted.  She does have some discoloration in the distal toes bilaterally with cold toes secondary to poor flow of blood.  Good sensation was noted in both sets of toes.   Neurological:      Mental Status: She is alert and oriented to person, place, and time.      Motor: No abnormal muscle tone.      Deep Tendon Reflexes: Reflexes normal.   Psychiatric:         Behavior: Behavior normal.         Thought Content: Thought content normal.         Judgment: Judgment normal.         Recent Results (from the past 2016 hour(s))   POCT urinalysis dipstick, automated    Collection Time: 12/07/20 11:42 AM    Specimen: Urine   Result Value Ref Range    Color Yellow Yellow, Straw, Dark Yellow, Sepideh    Clarity, UA Clear Clear    Specific Gravity  1.010 1.005 - 1.030    pH, Urine 6.5 5.0 - 8.0    Leukocytes Negative Negative    Nitrite, UA Negative Negative    Protein, POC Negative Negative mg/dL    Glucose, UA Negative Negative, 1000 mg/dL (3+) mg/dL    Ketones, UA Negative Negative    Urobilinogen, UA Normal Normal    Bilirubin Negative Negative    Blood, UA Negative Negative     Assessment/Plan   Diagnoses and all orders for this visit:    1. Herpes zoster without complication (Primary)  -     valACYclovir (Valtrex) 1000 MG tablet; Take 1 tablet by mouth 3 (Three) Times a Day.  Dispense: 21 tablet; Refill: 0  -     triamcinolone (KENALOG) 0.1 % cream; Apply  topically to the appropriate area as directed 2 (Two) Times a Day.  Dispense: 45 g; Refill: 0    2. Rash of  foot  -     valACYclovir (Valtrex) 1000 MG tablet; Take 1 tablet by mouth 3 (Three) Times a Day.  Dispense: 21 tablet; Refill: 0  -     triamcinolone (KENALOG) 0.1 % cream; Apply  topically to the appropriate area as directed 2 (Two) Times a Day.  Dispense: 45 g; Refill: 0    After discussion and exposure history was taken it appears the rash may actually be a shingles variant rash of the foot and toes.  I recommend to treat with antiviral steroid cream elevation and observe.  If worsening problems, pain, drainage that is to follow-up.

## 2020-12-29 ENCOUNTER — TELEPHONE (OUTPATIENT)
Dept: FAMILY MEDICINE CLINIC | Facility: CLINIC | Age: 65
End: 2020-12-29

## 2020-12-29 DIAGNOSIS — B02.9 HERPES ZOSTER WITHOUT COMPLICATION: Primary | ICD-10-CM

## 2020-12-29 RX ORDER — ACYCLOVIR 800 MG/1
800 TABLET ORAL
Qty: 35 TABLET | Refills: 0 | Status: SHIPPED | OUTPATIENT
Start: 2020-12-29

## 2020-12-29 NOTE — TELEPHONE ENCOUNTER
The only thing that may be cheaper is acyclovir which is 800 mg 5 times a day for a week and a prescription has been sent to the pharmacy.

## 2020-12-29 NOTE — TELEPHONE ENCOUNTER
PATIENT CALLED STATING THAT THE PRESCRIPTION THAT DR. CADET WROTE HER YESTERDAY (12/28/20) FOR THE SHINGLES ON HER TOES, THE VALACYCLOVIR (VALTREX) 1000 MG TABLET, IS GOING TO COST HER $50 FOR 7 TABLETS, AND SHE IS UNABLE TO AFFORD IT. CONSEQUENTLY, THE PATIENT STATED THAT SHE WOULD LIKE AN ALTERNATIVE MEDICATION TO BE PRESCRIBED THAT IS CHEAPER, IF POSSIBLE.    I CONFIRMED THE CORRECT PHARMACY WITH THE PATIENT TO BE [facility] ON [facility] ON [facility] (PHONE NUMBER: [phone]).    PLEASE CALL THE PATIENT [phone] WHEN THIS MESSAGE HAS BEEN RECEIVED AND INFORM HER OF THE STATUS OF THIS REQUEST.

## 2020-12-29 NOTE — TELEPHONE ENCOUNTER
PATIENT CALLED STATING THAT SHE WAS IN OFFICE YESTERDAY (12/28/20) TO SEE DR. CADET, AND SHE WANTED TO LET HIM KNOW THAT HER GRANDSON, WHOM SHE LAST SAW ON ULISSES DAY (12/25/20), WAS TESTED FOR COVID-19 TODAY (12/29/20) AND WAS INFORMED THAT HE WAS POSITIVE. THE PATIENT STATED THAT SHE IS NOT CURRENTLY EXPERIENCING ANY SYMPTOMS OF COVID-19, AND SHE DOES NOT WANT TO BE TESTED AT THIS TIME, BUT SHE JUST WANTED TO MAKE DR. CADET AWARE OF THIS.    IF THERE ARE ANY QUESTIONS OR CONCERNS PLEASE CALL THE PATIENT [phone].

## 2021-01-06 DIAGNOSIS — G89.4 CHRONIC PAIN SYNDROME: ICD-10-CM

## 2021-01-06 DIAGNOSIS — M15.9 PRIMARY OSTEOARTHRITIS INVOLVING MULTIPLE JOINTS: ICD-10-CM

## 2021-01-07 RX ORDER — HYDROCODONE BITARTRATE AND ACETAMINOPHEN 7.5; 325 MG/1; MG/1
1 TABLET ORAL EVERY 8 HOURS PRN
Qty: 90 TABLET | Refills: 0 | Status: SHIPPED | OUTPATIENT
Start: 2021-01-07 | End: 2021-02-03 | Stop reason: SDUPTHER

## 2021-01-14 DIAGNOSIS — J44.9 CHRONIC OBSTRUCTIVE PULMONARY DISEASE, UNSPECIFIED COPD TYPE (HCC): Primary | ICD-10-CM

## 2021-01-14 RX ORDER — IPRATROPIUM BROMIDE AND ALBUTEROL SULFATE 2.5; .5 MG/3ML; MG/3ML
3 SOLUTION RESPIRATORY (INHALATION)
Qty: 360 ML | Refills: 3 | Status: SHIPPED | OUTPATIENT
Start: 2021-01-14 | End: 2022-02-20 | Stop reason: SDUPTHER

## 2021-01-14 RX ORDER — IPRATROPIUM BROMIDE AND ALBUTEROL SULFATE 2.5; .5 MG/3ML; MG/3ML
3 SOLUTION RESPIRATORY (INHALATION)
Qty: 360 ML | Refills: 3 | Status: SHIPPED | OUTPATIENT
Start: 2021-01-14 | End: 2021-01-14

## 2021-02-03 DIAGNOSIS — M15.9 PRIMARY OSTEOARTHRITIS INVOLVING MULTIPLE JOINTS: ICD-10-CM

## 2021-02-03 DIAGNOSIS — G89.4 CHRONIC PAIN SYNDROME: ICD-10-CM

## 2021-02-03 DIAGNOSIS — K21.9 GASTROESOPHAGEAL REFLUX DISEASE WITHOUT ESOPHAGITIS: ICD-10-CM

## 2021-02-03 RX ORDER — PANTOPRAZOLE SODIUM 40 MG/1
TABLET, DELAYED RELEASE ORAL
Qty: 90 TABLET | Refills: 1 | Status: SHIPPED | OUTPATIENT
Start: 2021-02-03 | End: 2021-06-21 | Stop reason: SDUPTHER

## 2021-02-04 RX ORDER — HYDROCODONE BITARTRATE AND ACETAMINOPHEN 7.5; 325 MG/1; MG/1
1 TABLET ORAL EVERY 8 HOURS PRN
Qty: 90 TABLET | Refills: 0 | Status: SHIPPED | OUTPATIENT
Start: 2021-02-04 | End: 2021-03-08 | Stop reason: SDUPTHER

## 2021-02-22 DIAGNOSIS — F51.01 PRIMARY INSOMNIA: ICD-10-CM

## 2021-02-23 RX ORDER — AMITRIPTYLINE HYDROCHLORIDE 100 MG/1
100 TABLET, FILM COATED ORAL NIGHTLY
Qty: 90 TABLET | Refills: 1 | Status: SHIPPED | OUTPATIENT
Start: 2021-02-23 | End: 2021-08-20

## 2021-03-03 DIAGNOSIS — I10 ESSENTIAL HYPERTENSION: ICD-10-CM

## 2021-03-04 RX ORDER — METOPROLOL TARTRATE 100 MG/1
100 TABLET ORAL 2 TIMES DAILY
Qty: 180 TABLET | Refills: 1 | Status: SHIPPED | OUTPATIENT
Start: 2021-03-04 | End: 2021-06-21 | Stop reason: SDUPTHER

## 2021-03-08 ENCOUNTER — OFFICE VISIT (OUTPATIENT)
Dept: FAMILY MEDICINE CLINIC | Facility: CLINIC | Age: 66
End: 2021-03-08

## 2021-03-08 VITALS
TEMPERATURE: 97.4 F | DIASTOLIC BLOOD PRESSURE: 80 MMHG | WEIGHT: 163 LBS | OXYGEN SATURATION: 99 % | HEIGHT: 66 IN | HEART RATE: 80 BPM | BODY MASS INDEX: 26.2 KG/M2 | SYSTOLIC BLOOD PRESSURE: 122 MMHG

## 2021-03-08 DIAGNOSIS — I83.93 VARICOSE VEINS OF BOTH LOWER EXTREMITIES, UNSPECIFIED WHETHER COMPLICATED: ICD-10-CM

## 2021-03-08 DIAGNOSIS — R53.82 CHRONIC FATIGUE: ICD-10-CM

## 2021-03-08 DIAGNOSIS — L81.9 DISCOLORATION OF SKIN OF FOOT: ICD-10-CM

## 2021-03-08 DIAGNOSIS — I87.2 EDEMA OF LEFT LOWER EXTREMITY DUE TO PERIPHERAL VENOUS INSUFFICIENCY: ICD-10-CM

## 2021-03-08 DIAGNOSIS — M15.9 PRIMARY OSTEOARTHRITIS INVOLVING MULTIPLE JOINTS: ICD-10-CM

## 2021-03-08 DIAGNOSIS — G89.4 CHRONIC PAIN SYNDROME: ICD-10-CM

## 2021-03-08 DIAGNOSIS — E78.5 HYPERLIPIDEMIA, UNSPECIFIED HYPERLIPIDEMIA TYPE: ICD-10-CM

## 2021-03-08 DIAGNOSIS — I10 ESSENTIAL HYPERTENSION: Primary | ICD-10-CM

## 2021-03-08 PROCEDURE — 99214 OFFICE O/P EST MOD 30 MIN: CPT | Performed by: INTERNAL MEDICINE

## 2021-03-08 RX ORDER — LANOLIN ALCOHOL/MO/W.PET/CERES
1000 CREAM (GRAM) TOPICAL DAILY
COMMUNITY

## 2021-03-08 RX ORDER — ARFORMOTEROL TARTRATE 15 UG/2ML
SOLUTION RESPIRATORY (INHALATION)
COMMUNITY

## 2021-03-08 RX ORDER — HYDROCODONE BITARTRATE AND ACETAMINOPHEN 7.5; 325 MG/1; MG/1
1 TABLET ORAL EVERY 8 HOURS PRN
Qty: 90 TABLET | Refills: 0 | Status: SHIPPED | OUTPATIENT
Start: 2021-03-08 | End: 2021-04-04 | Stop reason: SDUPTHER

## 2021-03-08 RX ORDER — BUDESONIDE 0.5 MG/2ML
0.5 INHALANT ORAL
COMMUNITY

## 2021-03-08 RX ORDER — IPRATROPIUM BROMIDE AND ALBUTEROL SULFATE 2.5; .5 MG/3ML; MG/3ML
SOLUTION RESPIRATORY (INHALATION)
COMMUNITY
Start: 2021-01-10 | End: 2021-03-08 | Stop reason: SDUPTHER

## 2021-03-08 NOTE — PROGRESS NOTES
Subjective   Lula Decker is a 65 y.o. female.     Chief Complaint   Patient presents with   • Pain     med refill   • Lower Extremity Issue       History of Present Illness     Follow-up for hypertension.  Currently, has been feeling well and asymptomatic without any headaches,vision changes, cough, chest pain, shortness of breath, swelling, focal neurologic deficit, memory loss or syncope.  Has been taking the medications regularly and adherent with the regimen of metoprolol tartrate 100 mg twice a day.  Denies medication side effects and no significant interval events.      Follow up with continued knee pain that intermittently flares and is severe in the right.  Has seen ortho and only options are injections or surgery but patient continues to refuse these options at this time.     Follow-up with GERD and intermittent nausea lasting 5- 10 minutes then self resolves.  This has been a chronic issue for a long time and unchanged.  She continues with the pantoprazole 40 mg daily.     Continues to have chronic pain in the back, shoulders and knee.  Using the hydrocodone as needed and averages 2-3 times per day.  Denies side effects or issues with the problems or medications.     Patient with COPD and on oxygen nasal canula and using the inhaled medications is stable to slightly worsening and continues to see pulmonology.  Now on duo-neb, pulmicort and brovana.    Patient with purple colored toes and some pain at night but the amitryptiline helps.  Had arterial MIKE study 4 years ago that was normal.    The following portions of the patient's history were reviewed and updated as appropriate: allergies, current medications, past family history, past medical history, past social history, past surgical history and problem list.    Depression Screen:  PHQ-2/PHQ-9 Depression Screening 5/7/2020   Little interest or pleasure in doing things 0   Feeling down, depressed, or hopeless 0   Trouble falling or staying asleep, or  sleeping too much 0   Feeling tired or having little energy 0   Poor appetite or overeating 0   Feeling bad about yourself - or that you are a failure or have let yourself or your family down 0   Trouble concentrating on things, such as reading the newspaper or watching television 0   Moving or speaking so slowly that other people could have noticed. Or the opposite - being so fidgety or restless that you have been moving around a lot more than usual 0   Thoughts that you would be better off dead, or of hurting yourself in some way 0   Total Score 0       Past Medical History:   Diagnosis Date   • Abnormal findings on diagnostic imaging of abdomen    • Alopecia areata    • Asthma    • Bleeding external hemorrhoids    • BMI 25.0-25.9,adult    • Calf cramp    • Carpal tunnel syndrome    • Chest pain    • Chest wall pain    • Constipation    • COPD (chronic obstructive pulmonary disease) (CMS/HCC)    • COPD with acute exacerbation (CMS/HCC)    • Discoloration of skin of foot    • Dyslipidemia    • Dysuria    • Eczema    • Encounter for immunization    • Esophageal reflux    • Fatty liver    • GERD (gastroesophageal reflux disease)    • Headache    • Hives of unknown origin    • Hyperactivity of bladder    • Hypertension    • IBS (irritable bowel syndrome)    • Incontinence of urine    • Insomnia    • Menopausal symptom    • Migraine    • MRSA exposure    • Muscle spasm    • Nocturia    • OA (osteoarthritis)    • Obstructive chronic bronchitis with acute exacerbation (CMS/HCC)    • Oral thrush    • Osteoarthritis of left knee    • Osteoporosis    • PAD (peripheral artery disease) (CMS/HCC)    • Sinusitis, acute    • Synovial cyst of popliteal space    • Tremor    • Tubular adenoma of colon    • Urinary frequency    • Urinary urgency    • Urinary, incontinence, stress female    • UTI (urinary tract infection)        Past Surgical History:   Procedure Laterality Date   • BREAST LUMPECTOMY     • BREAST SURGERY     •  COLONOSCOPY     • HYSTERECTOMY      Not due to cancer   • NECK SURGERY     • NEUROPLASTY      Median nerve at carpal tunnel.   • OOPHORECTOMY     • OTHER SURGICAL HISTORY  08/20/2014    Esophadogastroduodenoscopy - Gastritis    • TONSILLECTOMY     • TUBAL ABDOMINAL LIGATION         Family History   Problem Relation Age of Onset   • Heart failure Father        Social History     Socioeconomic History   • Marital status:      Spouse name: Not on file   • Number of children: Not on file   • Years of education: Not on file   • Highest education level: Not on file   Tobacco Use   • Smoking status: Current Every Day Smoker     Packs/day: 0.50     Types: Cigarettes   • Smokeless tobacco: Never Used   Substance and Sexual Activity   • Alcohol use: Yes   • Drug use: No   • Sexual activity: Defer       Current Outpatient Medications   Medication Sig Dispense Refill   • acyclovir (ZOVIRAX) 800 MG tablet Take 1 tablet by mouth 5 (Five) Times a Day. 35 tablet 0   • amitriptyline (ELAVIL) 100 MG tablet Take 1 tablet by mouth Every Night. 90 tablet 1   • arformoterol (BROVANA) 15 MCG/2ML nebulizer solution Take  by nebulization 2 (Two) Times a Day.     • budesonide (PULMICORT) 0.5 MG/2ML nebulizer solution Take 0.5 mg by nebulization Daily.     • doxylamine (UNISOM) 25 MG tablet Take 1 tablet by mouth Daily.     • HYDROcodone-acetaminophen (NORCO) 7.5-325 MG per tablet Take 1 tablet by mouth Every 8 (Eight) Hours As Needed for Moderate Pain . 90 tablet 0   • hydrocortisone 2.5 % cream Apply 2.5 application topically to the appropriate area as directed 3 (Three) Times a Day.     • ipratropium-albuterol (DUO-NEB) 0.5-2.5 mg/3 ml nebulizer Take 3 mL by nebulization 4 (Four) Times a Day. 360 mL 3   • metoprolol tartrate (LOPRESSOR) 100 MG tablet Take 1 tablet by mouth 2 (Two) Times a Day. 180 tablet 1   • naproxen sodium (ALEVE) 220 MG tablet Take 220 mg by mouth 2 (Two) Times a Day As Needed.     • oxybutynin XL (DITROPAN-XL)  "5 MG 24 hr tablet Take 1 tablet by mouth Daily. 30 tablet 3   • pantoprazole (PROTONIX) 40 MG EC tablet TAKE 1 TABLET BY MOUTH EVERY DAY 90 tablet 1   • PROAIR  (90 Base) MCG/ACT inhaler INHALE 2 PUFFS EVERY 4 HOURS AS NEEDED 8.5 g 6   • triamcinolone (KENALOG) 0.1 % cream Apply  topically to the appropriate area as directed 2 (Two) Times a Day. 45 g 0   • vitamin B-12 (CYANOCOBALAMIN) 1000 MCG tablet Take 1,000 mcg by mouth Daily.       No current facility-administered medications for this visit.       Review of Systems   Constitutional: Negative for activity change, appetite change, fatigue, fever, unexpected weight gain and unexpected weight loss.   HENT: Negative for nosebleeds, rhinorrhea, trouble swallowing and voice change.    Eyes: Negative for visual disturbance.   Respiratory: Positive for cough and shortness of breath. Negative for chest tightness and wheezing.         Chronic short of breath and cough.   Cardiovascular: Negative for chest pain, palpitations and leg swelling.   Gastrointestinal: Negative for abdominal pain, blood in stool, constipation, diarrhea, nausea, vomiting, GERD and indigestion.   Genitourinary: Negative for dysuria, frequency and hematuria.   Musculoskeletal: Positive for arthralgias and back pain. Negative for myalgias.   Skin: Negative for rash and bruise.        Cold feet and toes with some pain.   Neurological: Negative for dizziness, tremors, weakness, light-headedness, numbness, headache and memory problem.   Hematological: Negative for adenopathy. Does not bruise/bleed easily.   Psychiatric/Behavioral: Negative for sleep disturbance and depressed mood. The patient is not nervous/anxious.        Objective   /80 (BP Location: Right arm, Patient Position: Sitting, Cuff Size: Adult)   Pulse 80   Temp 97.4 °F (36.3 °C) (Temporal)   Ht 167.6 cm (65.98\")   Wt 73.9 kg (163 lb)   SpO2 99%   BMI 26.32 kg/m²     Physical Exam  Vitals and nursing note reviewed. "   Constitutional:       General: She is not in acute distress.     Appearance: She is well-developed. She is not diaphoretic.   HENT:      Head: Normocephalic and atraumatic.      Right Ear: External ear normal.      Left Ear: External ear normal.      Nose: Nose normal.   Eyes:      Conjunctiva/sclera: Conjunctivae normal.      Pupils: Pupils are equal, round, and reactive to light.   Neck:      Thyroid: No thyromegaly.      Trachea: No tracheal deviation.   Cardiovascular:      Rate and Rhythm: Normal rate and regular rhythm.      Heart sounds: Normal heart sounds. No murmur. No friction rub. No gallop.    Pulmonary:      Effort: Pulmonary effort is normal. No respiratory distress.      Breath sounds: Normal breath sounds.   Abdominal:      General: Bowel sounds are normal.      Palpations: Abdomen is soft. There is no mass.      Tenderness: There is no abdominal tenderness. There is no guarding.   Musculoskeletal:         General: Normal range of motion.      Cervical back: Normal range of motion and neck supple.   Lymphadenopathy:      Cervical: No cervical adenopathy.   Skin:     General: Skin is warm and dry.      Capillary Refill: Capillary refill takes less than 2 seconds.      Findings: No rash.      Comments: Bilateral feet discoloration and varicose veins that are cold with poor cap refill.   Neurological:      Mental Status: She is alert and oriented to person, place, and time.      Motor: No abnormal muscle tone.      Deep Tendon Reflexes: Reflexes normal.   Psychiatric:         Behavior: Behavior normal.         Thought Content: Thought content normal.         Judgment: Judgment normal.         No results found for this or any previous visit (from the past 2016 hour(s)).  Assessment/Plan   Diagnoses and all orders for this visit:    1. Essential hypertension (Primary)  -     CBC & Differential  -     Comprehensive Metabolic Panel  -     Lipid Panel    2. Chronic pain syndrome  -      HYDROcodone-acetaminophen (NORCO) 7.5-325 MG per tablet; Take 1 tablet by mouth Every 8 (Eight) Hours As Needed for Moderate Pain .  Dispense: 90 tablet; Refill: 0    3. Primary osteoarthritis involving multiple joints  -     HYDROcodone-acetaminophen (NORCO) 7.5-325 MG per tablet; Take 1 tablet by mouth Every 8 (Eight) Hours As Needed for Moderate Pain .  Dispense: 90 tablet; Refill: 0    4. Edema of left lower extremity due to peripheral venous insufficiency    5. Varicose veins of both lower extremities, unspecified whether complicated    6. Discoloration of skin of foot  -     Cancel: Ambulatory Referral to Podiatry  -     Ambulatory Referral to Podiatry    7. Chronic fatigue  -     CBC & Differential  -     TSH  -     T4, Free    8. Hyperlipidemia, unspecified hyperlipidemia type  -     Comprehensive Metabolic Panel  -     Lipid Panel    Hypertension is well controlled.  Continue the current medication.  Patient with chronic pain in the back and feet.  We will continue the current hydrocodone and discussed the risk of the medication.ROGER run and reviewed.  Risks of the medication include but are not limited to fatigue, somnolence, increased risk of falls, constipation, allergic reaction, dependence, and addiction.  Some swelling in the feet with discoloration which appears to be more of a an arterial disorder however looking back noted that she had absolutely normal arterial MIKE study 4 years ago.  Patient with chronic fatigue we will check the labs as noted above.  Will refer for podiatry evaluation.           · COVID-19 Precautions - Patient was compliant in wearing a mask. When I saw the patient, I used appropriate personal protective equipment (PPE) including mask and eye shield (standard procedure).  Additionally, I used gown and gloves if indicated.  Hand hygiene was completed before and after seeing the patient.  · Dictated utilizing Dragon Dictation

## 2021-03-09 LAB
ALBUMIN SERPL-MCNC: 4.6 G/DL (ref 3.8–4.8)
ALBUMIN/GLOB SERPL: 1.6 {RATIO} (ref 1.2–2.2)
ALP SERPL-CCNC: 102 IU/L (ref 39–117)
ALT SERPL-CCNC: 17 IU/L (ref 0–32)
AST SERPL-CCNC: 25 IU/L (ref 0–40)
BASOPHILS # BLD AUTO: 0.1 X10E3/UL (ref 0–0.2)
BASOPHILS NFR BLD AUTO: 1 %
BILIRUB SERPL-MCNC: <0.2 MG/DL (ref 0–1.2)
BUN SERPL-MCNC: 8 MG/DL (ref 8–27)
BUN/CREAT SERPL: 11 (ref 12–28)
CALCIUM SERPL-MCNC: 9.2 MG/DL (ref 8.7–10.3)
CHLORIDE SERPL-SCNC: 100 MMOL/L (ref 96–106)
CHOLEST SERPL-MCNC: 282 MG/DL (ref 100–199)
CO2 SERPL-SCNC: 25 MMOL/L (ref 20–29)
CREAT SERPL-MCNC: 0.75 MG/DL (ref 0.57–1)
EOSINOPHIL # BLD AUTO: 0.2 X10E3/UL (ref 0–0.4)
EOSINOPHIL NFR BLD AUTO: 2 %
ERYTHROCYTE [DISTWIDTH] IN BLOOD BY AUTOMATED COUNT: 13.7 % (ref 11.7–15.4)
GLOBULIN SER CALC-MCNC: 2.9 G/DL (ref 1.5–4.5)
GLUCOSE SERPL-MCNC: 77 MG/DL (ref 65–99)
HCT VFR BLD AUTO: 42 % (ref 34–46.6)
HDLC SERPL-MCNC: 57 MG/DL
HGB BLD-MCNC: 13.8 G/DL (ref 11.1–15.9)
IMM GRANULOCYTES # BLD AUTO: 0.1 X10E3/UL (ref 0–0.1)
IMM GRANULOCYTES NFR BLD AUTO: 1 %
LDLC SERPL CALC-MCNC: 177 MG/DL (ref 0–99)
LYMPHOCYTES # BLD AUTO: 3.5 X10E3/UL (ref 0.7–3.1)
LYMPHOCYTES NFR BLD AUTO: 43 %
MCH RBC QN AUTO: 30.4 PG (ref 26.6–33)
MCHC RBC AUTO-ENTMCNC: 32.9 G/DL (ref 31.5–35.7)
MCV RBC AUTO: 93 FL (ref 79–97)
MONOCYTES # BLD AUTO: 1 X10E3/UL (ref 0.1–0.9)
MONOCYTES NFR BLD AUTO: 13 %
NEUTROPHILS # BLD AUTO: 3.2 X10E3/UL (ref 1.4–7)
NEUTROPHILS NFR BLD AUTO: 40 %
PLATELET # BLD AUTO: 333 X10E3/UL (ref 150–450)
POTASSIUM SERPL-SCNC: 4.5 MMOL/L (ref 3.5–5.2)
PROT SERPL-MCNC: 7.5 G/DL (ref 6–8.5)
RBC # BLD AUTO: 4.54 X10E6/UL (ref 3.77–5.28)
SODIUM SERPL-SCNC: 139 MMOL/L (ref 134–144)
T4 FREE SERPL-MCNC: 1.1 NG/DL (ref 0.82–1.77)
TRIGL SERPL-MCNC: 255 MG/DL (ref 0–149)
TSH SERPL DL<=0.005 MIU/L-ACNC: 1.87 UIU/ML (ref 0.45–4.5)
VLDLC SERPL CALC-MCNC: 48 MG/DL (ref 5–40)
WBC # BLD AUTO: 8 X10E3/UL (ref 3.4–10.8)

## 2021-03-10 ENCOUNTER — TELEPHONE (OUTPATIENT)
Dept: FAMILY MEDICINE CLINIC | Facility: CLINIC | Age: 66
End: 2021-03-10

## 2021-03-10 NOTE — TELEPHONE ENCOUNTER
OK FOR HUB TO RELY  LMFCB     Your blood work returned as normal findings other than the cholesterol remains high.  At this time I would recommend starting a medication such as Crestor 10 mg once a day.  If you are agreeable to this please notify my office and we will send a prescription to the pharmacy of your choice.  Any results that were noted to be out of range are not significant at this time.  No new problems or issues were found.  No new changes are needed at this time.  Follow-up as discussed in the office.

## 2021-03-10 NOTE — TELEPHONE ENCOUNTER
Patient was informed of labs.  She is going to call her insurance to make sure they will cover Crestor.  If they don't she will get the names of a couple other medications that they would approve.

## 2021-03-16 ENCOUNTER — BULK ORDERING (OUTPATIENT)
Dept: CASE MANAGEMENT | Facility: OTHER | Age: 66
End: 2021-03-16

## 2021-03-16 DIAGNOSIS — Z23 IMMUNIZATION DUE: ICD-10-CM

## 2021-03-21 RX ORDER — ROSUVASTATIN CALCIUM 10 MG/1
10 TABLET, COATED ORAL DAILY
Qty: 30 TABLET | Refills: 5 | Status: SHIPPED | OUTPATIENT
Start: 2021-03-21 | End: 2021-06-21 | Stop reason: SDUPTHER

## 2021-04-04 DIAGNOSIS — G89.4 CHRONIC PAIN SYNDROME: ICD-10-CM

## 2021-04-04 DIAGNOSIS — M15.9 PRIMARY OSTEOARTHRITIS INVOLVING MULTIPLE JOINTS: ICD-10-CM

## 2021-04-05 RX ORDER — HYDROCODONE BITARTRATE AND ACETAMINOPHEN 7.5; 325 MG/1; MG/1
1 TABLET ORAL EVERY 8 HOURS PRN
Qty: 90 TABLET | Refills: 0 | Status: SHIPPED | OUTPATIENT
Start: 2021-04-05 | End: 2021-05-03 | Stop reason: SDUPTHER

## 2021-05-03 DIAGNOSIS — M15.9 PRIMARY OSTEOARTHRITIS INVOLVING MULTIPLE JOINTS: ICD-10-CM

## 2021-05-03 DIAGNOSIS — G89.4 CHRONIC PAIN SYNDROME: ICD-10-CM

## 2021-05-03 RX ORDER — HYDROCODONE BITARTRATE AND ACETAMINOPHEN 7.5; 325 MG/1; MG/1
1 TABLET ORAL EVERY 8 HOURS PRN
Qty: 90 TABLET | Refills: 0 | Status: SHIPPED | OUTPATIENT
Start: 2021-05-03 | End: 2021-06-03 | Stop reason: SDUPTHER

## 2021-06-03 DIAGNOSIS — G89.4 CHRONIC PAIN SYNDROME: ICD-10-CM

## 2021-06-03 DIAGNOSIS — M15.9 PRIMARY OSTEOARTHRITIS INVOLVING MULTIPLE JOINTS: ICD-10-CM

## 2021-06-04 NOTE — TELEPHONE ENCOUNTER
LOV 3/8/21  NOV 6/14/21  Labs 3/8/21  Last RF 5/3/21   MUSCULOSKELETAL AND PELVIC FLOOR EVALUATION:   Referring Physician: Dr. Dockery Slider  Diagnosis: R19.4 (ICD-10-CM) - 787.99 (ICD-9-CM) - Change in bowel habits  Date of Onset: 2 months ago   Date of Service: 2/13/2018     PATIENT SUMMARY   Eric Milner is a 58 y/o female who presents to outpatient PT with c/o of constipation, presence of LLQ pain before/during/after BM that can last for a prolonged period of time, and incomplete emptying with bowel. Denies any bowel incontinence.  Reports intermittent presen leg/tingling in B LEs.  Started over the last week    Vision Changes/Double Vision no    Headaches yes Started yesterday   Chest Pain or Palpitations, SOB yes Chest pain yesterday (heart burn), stated she called cardiac MD regarding    Dizziness, weakness, frequency: N/A  Fluid Intake: ~100 oz  Bladder irritants: juice  Urine Stop test: no  Post void dribble: no  Hovering: no  Empty bladder just in case: no  Do you ever leak urine without knowing it? no    BOWEL HABITS  Types of symptoms: Constipation, Pain Squat WNL WNL B femoral adduction   Single Leg Balance Mild trendelenburg Mild trendelenburg      DIASTASIS RECTI  (Finger width depth while contracted)  Above Umbilicus: none  Umbilicus: none  Below Umbilicus: none    PELVIC EXAMINATION  Verbal consent gi techniques, HEP, therapy progression, POC, objective findings. PLAN OF CARE:    Long Term Goals Timeframe (8 weeks, 2/13/18-4/10/18)  1.  Patient to be independent with progressive HEP during and upon discharge to aide with symptom management and certification required: Yes  I certify the need for these services furnished under this plan of treatment and while under my care.     X___________________________________________________ Date____________________

## 2021-06-08 RX ORDER — HYDROCODONE BITARTRATE AND ACETAMINOPHEN 7.5; 325 MG/1; MG/1
1 TABLET ORAL EVERY 8 HOURS PRN
Qty: 90 TABLET | Refills: 0 | Status: SHIPPED | OUTPATIENT
Start: 2021-06-08 | End: 2021-07-04 | Stop reason: SDUPTHER

## 2021-06-21 ENCOUNTER — OFFICE VISIT (OUTPATIENT)
Dept: FAMILY MEDICINE CLINIC | Facility: CLINIC | Age: 66
End: 2021-06-21

## 2021-06-21 VITALS
TEMPERATURE: 97.8 F | HEIGHT: 66 IN | DIASTOLIC BLOOD PRESSURE: 88 MMHG | HEART RATE: 88 BPM | OXYGEN SATURATION: 98 % | WEIGHT: 170 LBS | SYSTOLIC BLOOD PRESSURE: 136 MMHG | BODY MASS INDEX: 27.32 KG/M2

## 2021-06-21 DIAGNOSIS — B37.0 ORAL CANDIDIASIS: ICD-10-CM

## 2021-06-21 DIAGNOSIS — I10 ESSENTIAL HYPERTENSION: ICD-10-CM

## 2021-06-21 DIAGNOSIS — K21.9 GASTROESOPHAGEAL REFLUX DISEASE WITHOUT ESOPHAGITIS: ICD-10-CM

## 2021-06-21 DIAGNOSIS — E78.5 HYPERLIPIDEMIA, UNSPECIFIED HYPERLIPIDEMIA TYPE: Primary | ICD-10-CM

## 2021-06-21 PROCEDURE — 99212 OFFICE O/P EST SF 10 MIN: CPT | Performed by: INTERNAL MEDICINE

## 2021-06-21 PROCEDURE — G0439 PPPS, SUBSEQ VISIT: HCPCS | Performed by: INTERNAL MEDICINE

## 2021-06-21 RX ORDER — ROSUVASTATIN CALCIUM 10 MG/1
10 TABLET, COATED ORAL DAILY
Qty: 90 TABLET | Refills: 3 | Status: SHIPPED | OUTPATIENT
Start: 2021-06-21 | End: 2022-06-16

## 2021-06-21 RX ORDER — METOPROLOL TARTRATE 100 MG/1
100 TABLET ORAL 2 TIMES DAILY
Qty: 180 TABLET | Refills: 1 | Status: SHIPPED | OUTPATIENT
Start: 2021-06-21 | End: 2022-02-20 | Stop reason: SDUPTHER

## 2021-06-21 RX ORDER — PANTOPRAZOLE SODIUM 40 MG/1
40 TABLET, DELAYED RELEASE ORAL DAILY
Qty: 90 TABLET | Refills: 1 | Status: SHIPPED | OUTPATIENT
Start: 2021-06-21 | End: 2022-01-05

## 2021-06-21 RX ORDER — FLUCONAZOLE 100 MG/1
100 TABLET ORAL DAILY
Qty: 7 TABLET | Refills: 0 | Status: SHIPPED | OUTPATIENT
Start: 2021-06-21 | End: 2021-09-23

## 2021-06-21 NOTE — PATIENT INSTRUCTIONS
Medicare Wellness  Personal Prevention Plan of Service     Date of Office Visit:  2021  Encounter Provider:  Nghia Eaton MD  Place of Service:  [facility]  Patient Name: Lula Decker  :  1955    As part of the Medicare Wellness portion of your visit today, we are providing you with this personalized preventive plan of services (PPPS). This plan is based upon recommendations of the [place] Preventive Services Task Force (USPSTF) and the Advisory Committee on Immunization Practices (ACIP).    This lists the preventive care services that should be considered, and provides dates of when you are due. Items listed as completed are up-to-date and do not require any further intervention.    Health Maintenance   Topic Date Due   • MAMMOGRAM  Never done   • DXA SCAN  Never done   • TDAP/TD VACCINES (1 - Tdap) Never done   • ZOSTER VACCINE (1 of 2) Never done   • ANNUAL WELLNESS VISIT  2021   • INFLUENZA VACCINE  2021   • LIPID PANEL  2022   • COLORECTAL CANCER SCREENING  2024   • HEPATITIS C SCREENING  Completed   • COVID-19 Vaccine  Completed   • Pneumococcal Vaccine 65+  Completed       No orders of the defined types were placed in this encounter.      Return in about 3 months (around 2021) for Next scheduled follow up.

## 2021-06-21 NOTE — PROGRESS NOTES
Subsequent Medicare Wellness Visit   The ABC's of the Annual Wellness Visit    Chief Complaint   Patient presents with   • Annual Exam   • Thrush       HPI:  Lula Decker, -1955, is a 65 y.o. female who presents for a Subsequent Medicare Wellness Visit.    2 weeks of white irritated lesions in the mouth for the last 2 weeks with no bleeding.    Follow-up for hypertension.  Currently, has been feeling well and asymptomatic without any headaches,vision changes, cough, chest pain, shortness of breath, swelling, focal neurologic deficit, memory loss or syncope.  Has been taking the medications regularly and adherent with the regimen of metoprolol tartrate 100 mg twice a day.  Denies medication side effects and no significant interval events.      Follow up with continued knee pain that intermittently flares and is severe in the right.  Has seen ortho and only options are injections or surgery but patient continues to refuse these options at this time.     Follow-up with GERD and intermittent nausea lasting 5- 10 minutes then self resolves.  This has been a chronic issue for a long time and unchanged.  She continues with the pantoprazole 40 mg daily.     Continues to have chronic pain in the back, shoulders and knee.  Using the hydrocodone as needed and averages 2-3 times per day.  Denies side effects or issues with the problems or medications.     Patient with COPD and on oxygen nasal canula and using the inhaled medications is stable to slightly worsening and continues to see pulmonology.  Now on duo-neb, pulmicort and brovana.       Recent Hospitalizations:  No hospitalization(s) within the last year..    Current Medical Providers:  Patient Care Team:  Nghia Eaton MD as PCP - General (Internal Medicine)  Jeb Smart MD as Consulting Physician (Orthopedic Surgery)  Stefani Prince MD (Pulmonary Disease)  Hank Sawant MD as Consulting Physician (Gastroenterology)    Health Habits and  Functional and Cognitive Screening and Depression Screening:  Functional & Cognitive Status 6/21/2021   Do you have difficulty preparing food and eating? No   Do you have difficulty bathing yourself, getting dressed or grooming yourself? No   Do you have difficulty using the toilet? No   Do you have difficulty moving around from place to place? No   Do you have trouble with steps or getting out of a bed or a chair? No   Current Diet Well Balanced Diet   Dental Exam Not up to date   Eye Exam Not up to date   Exercise (times per week) 0 times per week   Current Exercise Activities Include -   Do you need help using the phone?  No   Are you deaf or do you have serious difficulty hearing?  No   Do you need help with transportation? No   Do you need help shopping? No   Do you need help preparing meals?  No   Do you need help with housework?  No   Do you need help with laundry? No   Do you need help taking your medications? No   Do you need help managing money? No   Do you ever drive or ride in a car without wearing a seat belt? No   Have you felt unusual stress, anger or loneliness in the last month? No   Who do you live with? Alone   If you need help, do you have trouble finding someone available to you? No   Have you been bothered in the last four weeks by sexual problems? No   Do you have difficulty concentrating, remembering or making decisions? No       Compared to one year ago, the patient feels her physical health is the same and her mental health is the same.    Depression Screen:  PHQ-2/PHQ-9 Depression Screening 6/21/2021   Little interest or pleasure in doing things 0   Feeling down, depressed, or hopeless 0   Trouble falling or staying asleep, or sleeping too much 0   Feeling tired or having little energy 3   Poor appetite or overeating 0   Feeling bad about yourself - or that you are a failure or have let yourself or your family down 0   Trouble concentrating on things, such as reading the newspaper or  watching television 0   Moving or speaking so slowly that other people could have noticed. Or the opposite - being so fidgety or restless that you have been moving around a lot more than usual 0   Thoughts that you would be better off dead, or of hurting yourself in some way 0   Total Score 3       Falls Risk Assessment:  KYLER Fall Risk Clinician Key Questions   Have you fallen in the past year?: No  Do you feel unsteady with walking?: No  Are you worried about falling?: No      Past Medical/Family/Social History:  The following portions of the patient's history were reviewed and updated as appropriate: allergies, current medications, past family history, past medical history, past social history, past surgical history and problem list.    Allergies   Allergen Reactions   • Nystatin GI Intolerance     Nausea with oral solution         Current Outpatient Medications:   •  acyclovir (ZOVIRAX) 800 MG tablet, Take 1 tablet by mouth 5 (Five) Times a Day., Disp: 35 tablet, Rfl: 0  •  amitriptyline (ELAVIL) 100 MG tablet, Take 1 tablet by mouth Every Night., Disp: 90 tablet, Rfl: 1  •  arformoterol (BROVANA) 15 MCG/2ML nebulizer solution, Take  by nebulization 2 (Two) Times a Day., Disp: , Rfl:   •  budesonide (PULMICORT) 0.5 MG/2ML nebulizer solution, Take 0.5 mg by nebulization Daily., Disp: , Rfl:   •  doxylamine (UNISOM) 25 MG tablet, Take 1 tablet by mouth Daily., Disp: , Rfl:   •  HYDROcodone-acetaminophen (NORCO) 7.5-325 MG per tablet, Take 1 tablet by mouth Every 8 (Eight) Hours As Needed for Moderate Pain ., Disp: 90 tablet, Rfl: 0  •  hydrocortisone 2.5 % cream, Apply 2.5 application topically to the appropriate area as directed 3 (Three) Times a Day., Disp: , Rfl:   •  ipratropium-albuterol (DUO-NEB) 0.5-2.5 mg/3 ml nebulizer, Take 3 mL by nebulization 4 (Four) Times a Day., Disp: 360 mL, Rfl: 3  •  metoprolol tartrate (LOPRESSOR) 100 MG tablet, Take 1 tablet by mouth 2 (Two) Times a Day., Disp: 180 tablet,  Rfl: 1  •  naproxen sodium (ALEVE) 220 MG tablet, Take 220 mg by mouth 2 (Two) Times a Day As Needed., Disp: , Rfl:   •  oxybutynin XL (DITROPAN-XL) 5 MG 24 hr tablet, Take 1 tablet by mouth Daily., Disp: 30 tablet, Rfl: 3  •  pantoprazole (PROTONIX) 40 MG EC tablet, Take 1 tablet by mouth Daily., Disp: 90 tablet, Rfl: 1  •  PROAIR  (90 Base) MCG/ACT inhaler, INHALE 2 PUFFS EVERY 4 HOURS AS NEEDED, Disp: 8.5 g, Rfl: 6  •  rosuvastatin (CRESTOR) 10 MG tablet, Take 1 tablet by mouth Daily., Disp: 90 tablet, Rfl: 3  •  triamcinolone (KENALOG) 0.1 % cream, Apply  topically to the appropriate area as directed 2 (Two) Times a Day., Disp: 45 g, Rfl: 0  •  vitamin B-12 (CYANOCOBALAMIN) 1000 MCG tablet, Take 1,000 mcg by mouth Daily., Disp: , Rfl:   •  fluconazole (DIFLUCAN) 100 MG tablet, Take 1 tablet by mouth Daily., Disp: 7 tablet, Rfl: 0    Aspirin use counseling: Does not need ASA (and currently is not on it)    Discussed the hydrocodon and risks which she is understanding and using as needing.    Family History   Problem Relation Age of Onset   • Heart failure Father        Social History     Tobacco Use   • Smoking status: Current Every Day Smoker     Packs/day: 0.50     Types: Cigarettes   • Smokeless tobacco: Never Used   Substance Use Topics   • Alcohol use: Yes       Past Surgical History:   Procedure Laterality Date   • BREAST LUMPECTOMY     • BREAST SURGERY     • COLONOSCOPY     • HYSTERECTOMY      Not due to cancer   • NECK SURGERY     • NEUROPLASTY      Median nerve at carpal tunnel.   • OOPHORECTOMY     • OTHER SURGICAL HISTORY  08/20/2014    Esophadogastroduodenoscopy - Gastritis    • TONSILLECTOMY     • TUBAL ABDOMINAL LIGATION         Patient Active Problem List   Diagnosis   • Chronic pain syndrome   • Hypertension   • GERD (gastroesophageal reflux disease)   • UTI (urinary tract infection)   • Right knee pain   • Visual disturbances   • Postmenopausal symptoms   • Worsening headaches   •  "Hyperlipidemia   • Urinary symptom or sign   • OA (osteoarthritis)   • Edema of left ankle   • Edema of left lower extremity due to peripheral venous insufficiency   • Varicose veins of both lower extremities   • Asthma   • COPD (chronic obstructive pulmonary disease) (CMS/[facility])   • Insomnia   • Rash of foot   • Herpes zoster without complication   • Discoloration of skin of foot       Review of Systems   Constitutional: Negative for activity change, appetite change, fatigue, fever, unexpected weight gain and unexpected weight loss.   HENT: Negative for nosebleeds, rhinorrhea, trouble swallowing and voice change.         White patches in the mouth and tongue   Eyes: Negative for visual disturbance.   Respiratory: Negative for cough, chest tightness, shortness of breath and wheezing.    Cardiovascular: Negative for chest pain, palpitations and leg swelling.   Gastrointestinal: Negative for abdominal pain, blood in stool, constipation, diarrhea, nausea, vomiting, GERD and indigestion.   Genitourinary: Negative for dysuria, frequency and hematuria.   Musculoskeletal: Negative for arthralgias, back pain and myalgias.   Skin: Negative for rash and bruise.   Neurological: Negative for dizziness, tremors, weakness, light-headedness, numbness, headache and memory problem.   Hematological: Negative for adenopathy. Does not bruise/bleed easily.   Psychiatric/Behavioral: Negative for sleep disturbance and depressed mood. The patient is not nervous/anxious.        Objective     Vitals:    06/21/21 1023   BP: 136/88   BP Location: Left arm   Patient Position: Sitting   Cuff Size: Adult   Pulse: 88   Temp: 97.8 °F (36.6 °C)   TempSrc: Temporal   SpO2: 98%   Weight: 77.1 kg (170 lb)   Height: 167.6 cm (65.98\")       Patient's Body mass index is 27.45 kg/m². indicating that she is overweight (BMI 25-29.9). Obesity-related health conditions include the following: hypertension, dyslipidemias, GERD, peripheral vascular disease and " osteoarthritis. Obesity is unchanged. BMI is is above average; BMI management plan is completed. We discussed portion control and increasing exercise..      No exam data present    The patient has no evidence of cognitve impairment.     Physical Exam  Vitals and nursing note reviewed.   Constitutional:       General: She is not in acute distress.     Appearance: She is well-developed. She is not diaphoretic.   HENT:      Head: Normocephalic and atraumatic.      Right Ear: External ear normal.      Left Ear: External ear normal.      Nose: Nose normal.      Mouth/Throat:      Comments: Tongue with a thick white coating and some small white plaques on the cheek and gums.  No bleeding or open sores.  Eyes:      Conjunctiva/sclera: Conjunctivae normal.      Pupils: Pupils are equal, round, and reactive to light.   Neck:      Thyroid: No thyromegaly.      Trachea: No tracheal deviation.   Cardiovascular:      Rate and Rhythm: Normal rate and regular rhythm.      Heart sounds: Normal heart sounds. No murmur heard.   No friction rub. No gallop.    Pulmonary:      Effort: Pulmonary effort is normal. No respiratory distress.      Breath sounds: Normal breath sounds.   Abdominal:      General: Bowel sounds are normal.      Palpations: Abdomen is soft. There is no mass.      Tenderness: There is no abdominal tenderness. There is no guarding.   Musculoskeletal:         General: Normal range of motion.      Cervical back: Normal range of motion and neck supple.   Lymphadenopathy:      Cervical: No cervical adenopathy.   Skin:     General: Skin is warm and dry.      Capillary Refill: Capillary refill takes less than 2 seconds.      Findings: No rash.   Neurological:      Mental Status: She is alert and oriented to person, place, and time.      Motor: No abnormal muscle tone.      Deep Tendon Reflexes: Reflexes normal.   Psychiatric:         Behavior: Behavior normal.         Thought Content: Thought content normal.          Judgment: Judgment normal.         Recent Lab Results:  Lab Results   Component Value Date    GLU 77 03/08/2021     Lab Results   Component Value Date    TRIG 255 (H) 03/08/2021    HDL 57 03/08/2021    VLDL 48 (H) 03/08/2021       Assessment/Plan   Age-appropriate Screening Schedule:  Refer to the list below for future screening recommendations based on patient's age, sex and/or medical conditions.      Health Maintenance   Topic Date Due   • MAMMOGRAM  Never done   • DXA SCAN  Never done   • TDAP/TD VACCINES (1 - Tdap) Never done   • ZOSTER VACCINE (1 of 2) Never done   • INFLUENZA VACCINE  08/01/2021   • LIPID PANEL  03/08/2022       Medicare Risks and Personalized Health Plan:  Advance Directive Discussion  Fall Risk  Glaucoma Risk  Immunizations Discussed/Encouraged (specific immunizations; Tdap and Shingrix )  Obesity/Overweight       CMS-Preventive Services Quick Reference  Medicare Preventive Services Addressed:  Annual Wellness Visit (AWV)  Bone Density Measurements  Glaucoma screening (for individuals with diabetes mellitus, family history of glaucoma, -Americans (> or =) age 50, -Americans (> or =) age 65)    Advance Care Planning:  ACP discussion was held with the patient during this visit. Patient has an advance directive (not in EMR), copy requested.    Diagnoses and all orders for this visit:    1. Hyperlipidemia, unspecified hyperlipidemia type (Primary)  -     rosuvastatin (CRESTOR) 10 MG tablet; Take 1 tablet by mouth Daily.  Dispense: 90 tablet; Refill: 3    2. Essential hypertension  -     metoprolol tartrate (LOPRESSOR) 100 MG tablet; Take 1 tablet by mouth 2 (Two) Times a Day.  Dispense: 180 tablet; Refill: 1    3. Gastroesophageal reflux disease without esophagitis  -     pantoprazole (PROTONIX) 40 MG EC tablet; Take 1 tablet by mouth Daily.  Dispense: 90 tablet; Refill: 1    4. Oral candidiasis  -     fluconazole (DIFLUCAN) 100 MG tablet; Take 1 tablet by mouth Daily.  Dispense:  7 tablet; Refill: 0      Reviewed history and annual wellness visit with patient during office time.  Medications reviewed as appropriate.  Discussed advanced directives and living will.  Patient has living will: Living will: yes and patient will bring copy to office.  Discussed fall risk and precautions encourage removing throw rugs and using grab bars within the home and bathroom.  Will check the labs as ordered above to evaluate the blood sugars, kidney, liver, cholesterol for screening.  Discussed flu shot recommended to get the high-dose influenza vaccine annually in the fall.  The patient has a COVID HM Topic on their chart, and they are fully vaccinated..  Prevnar-13 and pneumovax-23 up to date and appropriate.  Tdap and Shingrix vaccination series recommended.  Encourage follow-up with the eye doctor on annual basis for glaucoma evaluation.  Discussed weight and encouraged exercise as tolerated while following a healthy diet.  Colon cancer screening discussed and current status: colonoscopy 8/20/14.  Recommend to get annual mammograms and recommended Dexa scan.  She will schedule the mammogram and refuses the bone density.  Follow-up with specialists as scheduled.        An After Visit Summary and PPPS with all of these plans were given to the patient.      Follow Up:  Return in about 3 months (around 9/21/2021) for Next scheduled follow up refills and labs.         I spent an additional 12 minutes caring for Lula on this date of service beyond that of the Medicare wellness above for the oral thrush diagnosis and treatment discussion. This time includes time spent by me in the following activities:obtaining and/or reviewing a separately obtained history, counseling and educating the patient/family/caregiver, ordering medications, tests, or procedures and documenting information in the medical record  · COVID-19 Precautions - Patient was compliant in wearing a mask. When I saw the patient, I used appropriate  personal protective equipment (PPE) including mask and eye shield (standard procedure).  Additionally, I used gown and gloves if indicated.  Hand hygiene was completed before and after seeing the patient.  · Dictated utilizing Dragon Dictation

## 2021-07-04 DIAGNOSIS — M15.9 PRIMARY OSTEOARTHRITIS INVOLVING MULTIPLE JOINTS: ICD-10-CM

## 2021-07-04 DIAGNOSIS — G89.4 CHRONIC PAIN SYNDROME: ICD-10-CM

## 2021-07-07 RX ORDER — HYDROCODONE BITARTRATE AND ACETAMINOPHEN 7.5; 325 MG/1; MG/1
1 TABLET ORAL EVERY 8 HOURS PRN
Qty: 90 TABLET | Refills: 0 | Status: SHIPPED | OUTPATIENT
Start: 2021-07-07 | End: 2021-08-04 | Stop reason: SDUPTHER

## 2021-07-15 DIAGNOSIS — B37.0 THRUSH, ORAL: Primary | ICD-10-CM

## 2021-07-15 RX ORDER — CLOTRIMAZOLE 10 MG/1
10 LOZENGE ORAL; TOPICAL
Qty: 50 TABLET | Refills: 0 | Status: SHIPPED | OUTPATIENT
Start: 2021-07-15 | End: 2021-09-23

## 2021-08-04 DIAGNOSIS — G89.4 CHRONIC PAIN SYNDROME: ICD-10-CM

## 2021-08-04 DIAGNOSIS — M15.9 PRIMARY OSTEOARTHRITIS INVOLVING MULTIPLE JOINTS: ICD-10-CM

## 2021-08-04 RX ORDER — HYDROCODONE BITARTRATE AND ACETAMINOPHEN 7.5; 325 MG/1; MG/1
1 TABLET ORAL EVERY 8 HOURS PRN
Qty: 90 TABLET | Refills: 0 | Status: SHIPPED | OUTPATIENT
Start: 2021-08-04 | End: 2021-09-02 | Stop reason: SDUPTHER

## 2021-08-20 DIAGNOSIS — F51.01 PRIMARY INSOMNIA: ICD-10-CM

## 2021-08-20 RX ORDER — AMITRIPTYLINE HYDROCHLORIDE 100 MG/1
TABLET, FILM COATED ORAL
Qty: 90 TABLET | Refills: 1 | Status: SHIPPED | OUTPATIENT
Start: 2021-08-20 | End: 2022-02-20 | Stop reason: SDUPTHER

## 2021-08-20 NOTE — TELEPHONE ENCOUNTER
Rx Refill Note  Requested Prescriptions     Pending Prescriptions Disp Refills   • amitriptyline (ELAVIL) 100 MG tablet [Pharmacy Med Name: AMITRIPTYLINE  MG TAB] 90 tablet 1     Sig: TAKE 1 TABLET BY MOUTH EVERY DAY AT NIGHT      Last office visit with prescribing clinician: 6/21/2021      Next office visit with prescribing clinician: 9/23/2021            Alma Delia Vera MA  08/20/21, 13:06 EDT

## 2021-08-25 RX ORDER — ALBUTEROL SULFATE 90 UG/1
2 AEROSOL, METERED RESPIRATORY (INHALATION) EVERY 4 HOURS PRN
Qty: 8.5 G | Refills: 6 | Status: SHIPPED | OUTPATIENT
Start: 2021-08-25 | End: 2022-10-17

## 2021-08-25 NOTE — TELEPHONE ENCOUNTER
Rx Refill Note  Requested Prescriptions     Pending Prescriptions Disp Refills   • albuterol sulfate HFA (ProAir HFA) 108 (90 Base) MCG/ACT inhaler 8.5 g 6     Sig: Inhale 2 puffs Every 4 (Four) Hours As Needed.      Last office visit with prescribing clinician: 6/21/2021      Next office visit with prescribing clinician: 9/23/2021            Alma Delia Vera MA  08/25/21, 10:40 EDT

## 2021-09-02 DIAGNOSIS — M15.9 PRIMARY OSTEOARTHRITIS INVOLVING MULTIPLE JOINTS: ICD-10-CM

## 2021-09-02 DIAGNOSIS — G89.4 CHRONIC PAIN SYNDROME: ICD-10-CM

## 2021-09-02 RX ORDER — HYDROCODONE BITARTRATE AND ACETAMINOPHEN 7.5; 325 MG/1; MG/1
1 TABLET ORAL EVERY 8 HOURS PRN
Qty: 90 TABLET | Refills: 0 | Status: SHIPPED | OUTPATIENT
Start: 2021-09-02 | End: 2021-10-03 | Stop reason: SDUPTHER

## 2021-09-23 ENCOUNTER — OFFICE VISIT (OUTPATIENT)
Dept: FAMILY MEDICINE CLINIC | Facility: CLINIC | Age: 66
End: 2021-09-23

## 2021-09-23 VITALS
WEIGHT: 167 LBS | DIASTOLIC BLOOD PRESSURE: 84 MMHG | BODY MASS INDEX: 26.84 KG/M2 | SYSTOLIC BLOOD PRESSURE: 128 MMHG | HEART RATE: 80 BPM | OXYGEN SATURATION: 98 % | HEIGHT: 66 IN | TEMPERATURE: 97.9 F

## 2021-09-23 DIAGNOSIS — G89.4 CHRONIC PAIN SYNDROME: ICD-10-CM

## 2021-09-23 DIAGNOSIS — L98.9 SKIN LESION OF RIGHT ARM: Primary | ICD-10-CM

## 2021-09-23 DIAGNOSIS — M15.9 PRIMARY OSTEOARTHRITIS INVOLVING MULTIPLE JOINTS: ICD-10-CM

## 2021-09-23 PROCEDURE — 99213 OFFICE O/P EST LOW 20 MIN: CPT | Performed by: INTERNAL MEDICINE

## 2021-09-23 NOTE — PROGRESS NOTES
Subjective   Lula Decker is a 66 y.o. female.     Chief Complaint   Patient presents with   • Skin Problem       History of Present Illness   Answers for HPI/ROS submitted by the patient on 9/21/2021  Please describe your symptoms.: 3 month check-up an mole,sick to stomach somedays  Have you had these symptoms before?: No  How long have you been having these symptoms?: Greater than 2 weeks  What is the primary reason for your visit?: Other    Patient with a lesion on the right forearm for the last month that has been getting bigger with no pain, drainage or bleeding.  Started flat and colored but has enlarged and now is raised.    Still with thick surface of the tongue that burns with mints or acidic food/drink.  Tried biotine and antiyeast meds with no relief.    History of hypertension.  Currently, has been feeling well and asymptomatic without any headaches,vision changes, cough, chest pain, shortness of breath, swelling, focal neurologic deficit, memory loss or syncope.  Has been taking the medications regularly and adherent with the regimen of metoprolol tartrate 100 mg twice a day.  Denies medication side effects and no significant interval events.      Continued knee pain that intermittently flares and is severe in the right.  Has seen ortho and only options are injections or surgery but patient continues to refuse these options at this time.     Follow-up with GERD and intermittent nausea lasting 5- 10 minutes then self resolves.  This has been a chronic issue for a long time and unchanged.  She continues with the pantoprazole 40 mg daily.     Continues to have chronic pain in the back, shoulders and knee.  Using the hydrocodone as needed and averages 2-3 times per day.  Denies side effects or issues with the problems or medications.     Patient with COPD and on oxygen nasal canula and using the inhaled medications is stable to slightly worsening and continues to see pulmonology.  Now on duo-neb, pulmicort  and brovana.       The following portions of the patient's history were reviewed and updated as appropriate: allergies, current medications, past family history, past medical history, past social history, past surgical history and problem list.    Depression Screen:  PHQ-2/PHQ-9 Depression Screening 6/21/2021   Little interest or pleasure in doing things 0   Feeling down, depressed, or hopeless 0   Trouble falling or staying asleep, or sleeping too much 0   Feeling tired or having little energy 3   Poor appetite or overeating 0   Feeling bad about yourself - or that you are a failure or have let yourself or your family down 0   Trouble concentrating on things, such as reading the newspaper or watching television 0   Moving or speaking so slowly that other people could have noticed. Or the opposite - being so fidgety or restless that you have been moving around a lot more than usual 0   Thoughts that you would be better off dead, or of hurting yourself in some way 0   Total Score 3       Past Medical History:   Diagnosis Date   • Abnormal findings on diagnostic imaging of abdomen    • Alopecia areata    • Asthma    • Bleeding external hemorrhoids    • BMI 25.0-25.9,adult    • Calf cramp    • Carpal tunnel syndrome    • Chest pain    • Chest wall pain    • Constipation    • COPD (chronic obstructive pulmonary disease) (CMS/HCC)    • COPD with acute exacerbation (CMS/HCC)    • Discoloration of skin of foot    • Dyslipidemia    • Dysuria    • Eczema    • Encounter for immunization    • Esophageal reflux    • Fatty liver    • GERD (gastroesophageal reflux disease)    • Headache    • Hives of unknown origin    • Hyperactivity of bladder    • Hypertension    • IBS (irritable bowel syndrome)    • Incontinence of urine    • Insomnia    • Menopausal symptom    • Migraine    • MRSA exposure    • Muscle spasm    • Nocturia    • OA (osteoarthritis)    • Obstructive chronic bronchitis with acute exacerbation (CMS/HCC)    • Oral  thrush    • Osteoarthritis of left knee    • Osteoporosis    • PAD (peripheral artery disease) (CMS/HCC)    • Sinusitis, acute    • Synovial cyst of popliteal space    • Tremor    • Tubular adenoma of colon    • Urinary frequency    • Urinary urgency    • Urinary, incontinence, stress female    • UTI (urinary tract infection)        Past Surgical History:   Procedure Laterality Date   • BREAST LUMPECTOMY     • BREAST SURGERY     • COLONOSCOPY     • HYSTERECTOMY      Not due to cancer   • NECK SURGERY     • NEUROPLASTY      Median nerve at carpal tunnel.   • OOPHORECTOMY     • OTHER SURGICAL HISTORY  08/20/2014    Esophadogastroduodenoscopy - Gastritis    • TONSILLECTOMY     • TUBAL ABDOMINAL LIGATION         Family History   Problem Relation Age of Onset   • Heart failure Father        Social History     Socioeconomic History   • Marital status:      Spouse name: Not on file   • Number of children: Not on file   • Years of education: Not on file   • Highest education level: Not on file   Tobacco Use   • Smoking status: Current Every Day Smoker     Packs/day: 0.50     Types: Cigarettes   • Smokeless tobacco: Never Used   Substance and Sexual Activity   • Alcohol use: Yes   • Drug use: No   • Sexual activity: Defer       Current Outpatient Medications   Medication Sig Dispense Refill   • acyclovir (ZOVIRAX) 800 MG tablet Take 1 tablet by mouth 5 (Five) Times a Day. 35 tablet 0   • albuterol sulfate HFA (ProAir HFA) 108 (90 Base) MCG/ACT inhaler Inhale 2 puffs Every 4 (Four) Hours As Needed for Wheezing or Shortness of Air. 8.5 g 6   • amitriptyline (ELAVIL) 100 MG tablet TAKE 1 TABLET BY MOUTH EVERY DAY AT NIGHT 90 tablet 1   • arformoterol (BROVANA) 15 MCG/2ML nebulizer solution Take  by nebulization 2 (Two) Times a Day.     • budesonide (PULMICORT) 0.5 MG/2ML nebulizer solution Take 0.5 mg by nebulization Daily.     • doxylamine (UNISOM) 25 MG tablet Take 1 tablet by mouth Daily.     •  "HYDROcodone-acetaminophen (NORCO) 7.5-325 MG per tablet Take 1 tablet by mouth Every 8 (Eight) Hours As Needed for Moderate Pain . 90 tablet 0   • hydrocortisone 2.5 % cream Apply 2.5 application topically to the appropriate area as directed 3 (Three) Times a Day.     • ipratropium-albuterol (DUO-NEB) 0.5-2.5 mg/3 ml nebulizer Take 3 mL by nebulization 4 (Four) Times a Day. 360 mL 3   • metoprolol tartrate (LOPRESSOR) 100 MG tablet Take 1 tablet by mouth 2 (Two) Times a Day. 180 tablet 1   • naproxen sodium (ALEVE) 220 MG tablet Take 220 mg by mouth 2 (Two) Times a Day As Needed.     • pantoprazole (PROTONIX) 40 MG EC tablet Take 1 tablet by mouth Daily. 90 tablet 1   • rosuvastatin (CRESTOR) 10 MG tablet Take 1 tablet by mouth Daily. 90 tablet 3   • triamcinolone (KENALOG) 0.1 % cream Apply  topically to the appropriate area as directed 2 (Two) Times a Day. 45 g 0   • vitamin B-12 (CYANOCOBALAMIN) 1000 MCG tablet Take 1,000 mcg by mouth Daily.       No current facility-administered medications for this visit.       Review of Systems    Objective   /84 (BP Location: Left arm, Patient Position: Sitting, Cuff Size: Adult)   Pulse 80   Temp 97.9 °F (36.6 °C) (Temporal)   Ht 167.6 cm (65.98\")   Wt 75.8 kg (167 lb)   SpO2 98%   BMI 26.97 kg/m²     Physical Exam  Skin:     Comments: 3 mm raised round pigmented lesion right forearm with dry skin surface.         No results found for this or any previous visit (from the past 2016 hour(s)).  Assessment/Plan   Diagnoses and all orders for this visit:    1. Skin lesion of right arm (Primary)  -     Ambulatory Referral to Dermatology    2. Chronic pain syndrome    3. Primary osteoarthritis involving multiple joints    Skin lesion on the right forearm possible irritated basal cell carcinoma.  Will refer for dermatology evaluation and likely excision.  History of chronic pain syndrome osteoarthritis multiple joints appears to be stable continue the current medication " unchanged.  COPD on oxygen tolerating well.           · COVID-19 Precautions - Patient was compliant in wearing a mask. When I saw the patient, I used appropriate personal protective equipment (PPE) including mask and eye shield (standard procedure).  Additionally, I used gown and gloves if indicated.  Hand hygiene was completed before and after seeing the patient.  · Dictated utilizing Dragon Dictation

## 2021-10-03 DIAGNOSIS — M15.9 PRIMARY OSTEOARTHRITIS INVOLVING MULTIPLE JOINTS: ICD-10-CM

## 2021-10-03 DIAGNOSIS — G89.4 CHRONIC PAIN SYNDROME: ICD-10-CM

## 2021-10-04 RX ORDER — HYDROCODONE BITARTRATE AND ACETAMINOPHEN 7.5; 325 MG/1; MG/1
1 TABLET ORAL EVERY 8 HOURS PRN
Qty: 90 TABLET | Refills: 0 | Status: SHIPPED | OUTPATIENT
Start: 2021-10-04 | End: 2021-10-31 | Stop reason: SDUPTHER

## 2021-10-04 NOTE — TELEPHONE ENCOUNTER
Rx Refill Note  Requested Prescriptions     Pending Prescriptions Disp Refills   • HYDROcodone-acetaminophen (NORCO) 7.5-325 MG per tablet 90 tablet 0     Sig: Take 1 tablet by mouth Every 8 (Eight) Hours As Needed for Moderate Pain .      Last office visit with prescribing clinician: 9/23/2021      Next office visit with prescribing clinician: 12/30/2021            Alma Delia Vera MA  10/04/21, 11:29 EDT   Spoke with pt got pt scheduled today 12/09/2019 for 4:30 to see BT. Pt verbalize understands.

## 2021-10-31 DIAGNOSIS — G89.4 CHRONIC PAIN SYNDROME: ICD-10-CM

## 2021-10-31 DIAGNOSIS — M15.9 PRIMARY OSTEOARTHRITIS INVOLVING MULTIPLE JOINTS: ICD-10-CM

## 2021-11-01 NOTE — TELEPHONE ENCOUNTER
Rx Refill Note  Requested Prescriptions     Pending Prescriptions Disp Refills   • HYDROcodone-acetaminophen (NORCO) 7.5-325 MG per tablet 90 tablet 0     Sig: Take 1 tablet by mouth Every 8 (Eight) Hours As Needed for Moderate Pain .      Last office visit with prescribing clinician: 9/23/2021      Next office visit with prescribing clinician: 12/30/2021            Alma Delia Vera MA  11/01/21, 16:17 EDT

## 2021-11-02 RX ORDER — HYDROCODONE BITARTRATE AND ACETAMINOPHEN 7.5; 325 MG/1; MG/1
1 TABLET ORAL EVERY 8 HOURS PRN
Qty: 90 TABLET | Refills: 0 | Status: SHIPPED | OUTPATIENT
Start: 2021-11-02 | End: 2021-12-01 | Stop reason: SDUPTHER

## 2021-12-01 DIAGNOSIS — M15.9 PRIMARY OSTEOARTHRITIS INVOLVING MULTIPLE JOINTS: ICD-10-CM

## 2021-12-01 DIAGNOSIS — G89.4 CHRONIC PAIN SYNDROME: ICD-10-CM

## 2021-12-02 RX ORDER — HYDROCODONE BITARTRATE AND ACETAMINOPHEN 7.5; 325 MG/1; MG/1
1 TABLET ORAL EVERY 8 HOURS PRN
Qty: 90 TABLET | Refills: 0 | Status: SHIPPED | OUTPATIENT
Start: 2021-12-02 | End: 2021-12-30 | Stop reason: SDUPTHER

## 2021-12-02 NOTE — TELEPHONE ENCOUNTER
Rx Refill Note  Requested Prescriptions     Pending Prescriptions Disp Refills   • HYDROcodone-acetaminophen (NORCO) 7.5-325 MG per tablet 90 tablet 0     Sig: Take 1 tablet by mouth Every 8 (Eight) Hours As Needed for Moderate Pain .      Last office visit with prescribing clinician: 9/23/2021      Next office visit with prescribing clinician: 12/30/2021            Familia Sibley  12/02/21, 00:40 EST

## 2021-12-30 ENCOUNTER — OFFICE VISIT (OUTPATIENT)
Dept: FAMILY MEDICINE CLINIC | Facility: CLINIC | Age: 66
End: 2021-12-30

## 2021-12-30 VITALS
HEART RATE: 81 BPM | SYSTOLIC BLOOD PRESSURE: 134 MMHG | OXYGEN SATURATION: 96 % | TEMPERATURE: 98 F | BODY MASS INDEX: 27.8 KG/M2 | WEIGHT: 173 LBS | DIASTOLIC BLOOD PRESSURE: 80 MMHG | HEIGHT: 66 IN

## 2021-12-30 DIAGNOSIS — I10 PRIMARY HYPERTENSION: ICD-10-CM

## 2021-12-30 DIAGNOSIS — E78.5 HYPERLIPIDEMIA, UNSPECIFIED HYPERLIPIDEMIA TYPE: ICD-10-CM

## 2021-12-30 DIAGNOSIS — H11.32 SUBCONJUNCTIVAL HEMORRHAGE, LEFT: ICD-10-CM

## 2021-12-30 DIAGNOSIS — M15.9 PRIMARY OSTEOARTHRITIS INVOLVING MULTIPLE JOINTS: ICD-10-CM

## 2021-12-30 DIAGNOSIS — G89.4 CHRONIC PAIN SYNDROME: Primary | ICD-10-CM

## 2021-12-30 PROCEDURE — 99213 OFFICE O/P EST LOW 20 MIN: CPT | Performed by: INTERNAL MEDICINE

## 2021-12-30 RX ORDER — HYDROCODONE BITARTRATE AND ACETAMINOPHEN 7.5; 325 MG/1; MG/1
1 TABLET ORAL EVERY 8 HOURS PRN
Qty: 90 TABLET | Refills: 0 | Status: SHIPPED | OUTPATIENT
Start: 2021-12-30 | End: 2022-02-01 | Stop reason: SDUPTHER

## 2021-12-30 NOTE — PROGRESS NOTES
Subjective   Lula Decker is a 66 y.o. female.     Chief Complaint   Patient presents with   • Pain       History of Present Illness   Answers for HPI/ROS submitted by the patient on 12/29/2021  Please describe your symptoms.: For three month check-up. Also blood in left eye, refill,change drug stores.  Have you had these symptoms before?: No  How long have you been having these symptoms?: 1-4 days  What is the primary reason for your visit?: Other    Insurance is changing 1/1/21 and will be changing to [employer].    History of hypertension.  Currently, has been feeling well and asymptomatic without any headaches,vision changes, cough, chest pain, shortness of breath, swelling, focal neurologic deficit, memory loss or syncope.  Has been taking the medications regularly and adherent with the regimen of metoprolol tartrate 100 mg twice a day.  Denies medication side effects and no significant interval events.      Continued knee pain that intermittently flares and is severe in the right.  Has seen ortho and only options are injections or surgery but patient continues to refuse these options at this time.     Follow-up with GERD and intermittent nausea lasting 5- 10 minutes then self resolves.  This has been a chronic issue for a long time and unchanged.  She continues with the pantoprazole 40 mg daily.     Continues to have chronic pain in the back, shoulders and knee.  Using the hydrocodone as needed and averages 2-3 times per day.  Denies side effects or issues with the problems or medications.     Patient with COPD and on oxygen nasal canula and using the inhaled medications is stable and continues to see pulmonology.  Now on duo-neb, pulmicort and brovana.    Awoke on 12/26/21 with red left eye on the medial aspect with no pain and no vision changes.    The following portions of the patient's history were reviewed and updated as appropriate: allergies, current medications, past family history, past medical  history, past social history, past surgical history and problem list.    Depression Screen:  PHQ-2/PHQ-9 Depression Screening 6/21/2021   Little interest or pleasure in doing things 0   Feeling down, depressed, or hopeless 0   Trouble falling or staying asleep, or sleeping too much 0   Feeling tired or having little energy 3   Poor appetite or overeating 0   Feeling bad about yourself - or that you are a failure or have let yourself or your family down 0   Trouble concentrating on things, such as reading the newspaper or watching television 0   Moving or speaking so slowly that other people could have noticed. Or the opposite - being so fidgety or restless that you have been moving around a lot more than usual 0   Thoughts that you would be better off dead, or of hurting yourself in some way 0   Total Score 3       Past Medical History:   Diagnosis Date   • Abnormal findings on diagnostic imaging of abdomen    • Alopecia areata    • Asthma    • Bleeding external hemorrhoids    • BMI 25.0-25.9,adult    • Calf cramp    • Carpal tunnel syndrome    • Chest pain    • Chest wall pain    • Constipation    • COPD (chronic obstructive pulmonary disease) ([facility])    • COPD with acute exacerbation ([facility])    • Discoloration of skin of foot    • Dyslipidemia    • Dysuria    • Eczema    • Encounter for immunization    • Esophageal reflux    • Fatty liver    • Fibromyalgia, primary    • GERD (gastroesophageal reflux disease)    • Headache    • Hives of unknown origin    • Hyperactivity of bladder    • Hypertension    • IBS (irritable bowel syndrome)    • Incontinence of urine    • Insomnia    • Menopausal symptom    • Migraine    • MRSA exposure    • Muscle spasm    • Nocturia    • OA (osteoarthritis)    • Obstructive chronic bronchitis with acute exacerbation ([facility])    • Oral thrush    • Osteoarthritis of left knee    • Osteoporosis    • PAD (peripheral artery disease) ([facility])    • Sinusitis, acute    • Synovial cyst of popliteal space     • Tremor    • Tubular adenoma of colon    • Urinary frequency    • Urinary urgency    • Urinary, incontinence, stress female    • UTI (urinary tract infection)        Past Surgical History:   Procedure Laterality Date   • BREAST LUMPECTOMY     • BREAST SURGERY     • COLONOSCOPY     • EYE SURGERY  catract surgery   • HYSTERECTOMY      Not due to cancer   • NECK SURGERY     • NEUROPLASTY      Median nerve at carpal tunnel.   • OOPHORECTOMY     • OTHER SURGICAL HISTORY  08/20/2014    Esophadogastroduodenoscopy - Gastritis    • TONSILLECTOMY     • TUBAL ABDOMINAL LIGATION         Family History   Problem Relation Age of Onset   • Heart failure Father    • Heart disease Father        Social History     Socioeconomic History   • Marital status:    Tobacco Use   • Smoking status: Current Every Day Smoker     Packs/day: 0.50     Years: 50.00     Pack years: 25.00     Types: Cigarettes   • Smokeless tobacco: Never Used   Substance and Sexual Activity   • Alcohol use: Yes     Alcohol/week: 0.0 standard drinks   • Drug use: No   • Sexual activity: Defer       Current Outpatient Medications   Medication Sig Dispense Refill   • acyclovir (ZOVIRAX) 800 MG tablet Take 1 tablet by mouth 5 (Five) Times a Day. 35 tablet 0   • albuterol sulfate HFA (ProAir HFA) 108 (90 Base) MCG/ACT inhaler Inhale 2 puffs Every 4 (Four) Hours As Needed for Wheezing or Shortness of Air. 8.5 g 6   • amitriptyline (ELAVIL) 100 MG tablet TAKE 1 TABLET BY MOUTH EVERY DAY AT NIGHT 90 tablet 1   • arformoterol (BROVANA) 15 MCG/2ML nebulizer solution Take  by nebulization 2 (Two) Times a Day.     • budesonide (PULMICORT) 0.5 MG/2ML nebulizer solution Take 0.5 mg by nebulization Daily.     • doxylamine (UNISOM) 25 MG tablet Take 1 tablet by mouth Daily.     • HYDROcodone-acetaminophen (NORCO) 7.5-325 MG per tablet Take 1 tablet by mouth Every 8 (Eight) Hours As Needed for Moderate Pain . 90 tablet 0   • hydrocortisone 2.5 % cream Apply 2.5 application  topically to the appropriate area as directed 3 (Three) Times a Day.     • ipratropium-albuterol (DUO-NEB) 0.5-2.5 mg/3 ml nebulizer Take 3 mL by nebulization 4 (Four) Times a Day. 360 mL 3   • metoprolol tartrate (LOPRESSOR) 100 MG tablet Take 1 tablet by mouth 2 (Two) Times a Day. 180 tablet 1   • naproxen sodium (ALEVE) 220 MG tablet Take 220 mg by mouth 2 (Two) Times a Day As Needed.     • pantoprazole (PROTONIX) 40 MG EC tablet Take 1 tablet by mouth Daily. 90 tablet 1   • rosuvastatin (CRESTOR) 10 MG tablet Take 1 tablet by mouth Daily. 90 tablet 3   • triamcinolone (KENALOG) 0.1 % cream Apply  topically to the appropriate area as directed 2 (Two) Times a Day. 45 g 0   • vitamin B-12 (CYANOCOBALAMIN) 1000 MCG tablet Take 1,000 mcg by mouth Daily.       No current facility-administered medications for this visit.       Review of Systems   Constitutional: Negative for activity change, appetite change, fatigue, fever, unexpected weight gain and unexpected weight loss.   HENT: Negative for congestion, nosebleeds, rhinorrhea, trouble swallowing and voice change.    Eyes: Negative for visual disturbance.   Respiratory: Positive for shortness of breath. Negative for cough, chest tightness and wheezing.    Cardiovascular: Negative for chest pain, palpitations and leg swelling.   Gastrointestinal: Positive for GERD. Negative for abdominal pain, blood in stool, constipation, diarrhea, nausea, vomiting and indigestion.   Genitourinary: Negative for dysuria, frequency and hematuria.   Musculoskeletal: Positive for arthralgias and neck pain. Negative for back pain and myalgias.   Skin: Negative for rash and bruise.   Neurological: Negative for dizziness, tremors, weakness, light-headedness, numbness, headache and memory problem.   Hematological: Negative for adenopathy. Does not bruise/bleed easily.   Psychiatric/Behavioral: Negative for sleep disturbance and depressed mood. The patient is not nervous/anxious.   "      Objective   /80 (BP Location: Left arm, Patient Position: Sitting, Cuff Size: Adult)   Pulse 81   Temp 98 °F (36.7 °C) (Temporal)   Ht 167.6 cm (65.98\")   Wt 78.5 kg (173 lb)   SpO2 96%   BMI 27.94 kg/m²     Physical Exam  Vitals and nursing note reviewed.   Constitutional:       General: She is not in acute distress.     Appearance: She is well-developed. She is not diaphoretic.   HENT:      Head: Normocephalic and atraumatic.      Right Ear: External ear normal.      Left Ear: External ear normal.      Nose: Nose normal.   Eyes:      Conjunctiva/sclera: Conjunctivae normal.      Pupils: Pupils are equal, round, and reactive to light.   Neck:      Thyroid: No thyromegaly.      Trachea: No tracheal deviation.   Cardiovascular:      Rate and Rhythm: Normal rate and regular rhythm.      Heart sounds: Normal heart sounds. No murmur heard.  No friction rub. No gallop.    Pulmonary:      Effort: Pulmonary effort is normal. No respiratory distress.      Breath sounds: Normal breath sounds.      Comments: On O2 NC  Abdominal:      General: Bowel sounds are normal.      Palpations: Abdomen is soft. There is no mass.      Tenderness: There is no abdominal tenderness. There is no guarding.   Musculoskeletal:         General: Normal range of motion.      Cervical back: Normal range of motion and neck supple.   Lymphadenopathy:      Cervical: No cervical adenopathy.   Skin:     General: Skin is warm and dry.      Capillary Refill: Capillary refill takes less than 2 seconds.      Findings: No rash.   Neurological:      Mental Status: She is alert and oriented to person, place, and time.      Motor: No abnormal muscle tone.      Deep Tendon Reflexes: Reflexes normal.   Psychiatric:         Behavior: Behavior normal.         Thought Content: Thought content normal.         Judgment: Judgment normal.         No results found for this or any previous visit (from the past 2016 hour(s)).  Assessment/Plan   Diagnoses " and all orders for this visit:    1. Chronic pain syndrome (Primary)  -     HYDROcodone-acetaminophen (NORCO) 7.5-325 MG per tablet; Take 1 tablet by mouth Every 8 (Eight) Hours As Needed for Moderate Pain .  Dispense: 90 tablet; Refill: 0    2. Primary osteoarthritis involving multiple joints  -     HYDROcodone-acetaminophen (NORCO) 7.5-325 MG per tablet; Take 1 tablet by mouth Every 8 (Eight) Hours As Needed for Moderate Pain .  Dispense: 90 tablet; Refill: 0    3. Primary hypertension    4. Hyperlipidemia, unspecified hyperlipidemia type    5. Subconjunctival hemorrhage, left    Chronic pain syndrome reviewed with patient.ROGER run and reviewed.  Risks of the medication include but are not limited to fatigue, somnolence, increased risk of falls, constipation, allergic reaction, dependence, and addiction.  Continue hydrocodone as needed.  Hypertension is controlled at this time.  No changes are needed.  A small subconjunctival hemorrhage on the left eye in the medial aspect which is not affecting any vision.  We will continue to observe if worsening problems or vision issues and is to follow-up or go emergency room or ophthalmologist.           · COVID-19 Precautions - Patient was compliant in wearing a mask. When I saw the patient, I used appropriate personal protective equipment (PPE) including mask and eye shield (standard procedure).  Additionally, I used gown and gloves if indicated.  Hand hygiene was completed before and after seeing the patient.  · Dictated utilizing Dragon Dictation

## 2022-01-05 DIAGNOSIS — K21.9 GASTROESOPHAGEAL REFLUX DISEASE WITHOUT ESOPHAGITIS: ICD-10-CM

## 2022-01-05 RX ORDER — PANTOPRAZOLE SODIUM 40 MG/1
TABLET, DELAYED RELEASE ORAL
Qty: 90 TABLET | Refills: 1 | Status: SHIPPED | OUTPATIENT
Start: 2022-01-05 | End: 2022-02-01 | Stop reason: SDUPTHER

## 2022-01-05 NOTE — TELEPHONE ENCOUNTER
Rx Refill Note  Requested Prescriptions     Pending Prescriptions Disp Refills   • pantoprazole (PROTONIX) 40 MG EC tablet [Pharmacy Med Name: PANTOPRAZOLE SOD DR 40 MG TAB] 90 tablet 1     Sig: TAKE 1 TABLET BY MOUTH EVERY DAY      Last office visit with prescribing clinician: 12/30/2021      Next office visit with prescribing clinician: 3/31/2022            Alma Delia Vera MA  01/05/22, 15:49 EST

## 2022-02-01 DIAGNOSIS — M15.9 PRIMARY OSTEOARTHRITIS INVOLVING MULTIPLE JOINTS: ICD-10-CM

## 2022-02-01 DIAGNOSIS — G89.4 CHRONIC PAIN SYNDROME: ICD-10-CM

## 2022-02-01 DIAGNOSIS — K21.9 GASTROESOPHAGEAL REFLUX DISEASE WITHOUT ESOPHAGITIS: ICD-10-CM

## 2022-02-02 RX ORDER — HYDROCODONE BITARTRATE AND ACETAMINOPHEN 7.5; 325 MG/1; MG/1
1 TABLET ORAL EVERY 8 HOURS PRN
Qty: 90 TABLET | Refills: 0 | Status: SHIPPED | OUTPATIENT
Start: 2022-02-02 | End: 2022-03-08 | Stop reason: SDUPTHER

## 2022-02-02 RX ORDER — PANTOPRAZOLE SODIUM 40 MG/1
40 TABLET, DELAYED RELEASE ORAL DAILY
Qty: 90 TABLET | Refills: 1 | Status: SHIPPED | OUTPATIENT
Start: 2022-02-02 | End: 2022-08-25

## 2022-02-02 NOTE — TELEPHONE ENCOUNTER
Rx Refill Note  Requested Prescriptions     Pending Prescriptions Disp Refills   • HYDROcodone-acetaminophen (NORCO) 7.5-325 MG per tablet 90 tablet 0     Sig: Take 1 tablet by mouth Every 8 (Eight) Hours As Needed for Moderate Pain .   • pantoprazole (PROTONIX) 40 MG EC tablet 90 tablet 1     Sig: Take 1 tablet by mouth Daily.      Last office visit with prescribing clinician: 12/30/2021      Next office visit with prescribing clinician: 3/31/2022            Alma Delia Vera MA  02/02/22, 07:41 EST

## 2022-02-17 DIAGNOSIS — F51.01 PRIMARY INSOMNIA: ICD-10-CM

## 2022-02-20 DIAGNOSIS — I10 ESSENTIAL HYPERTENSION: ICD-10-CM

## 2022-02-20 DIAGNOSIS — J44.9 CHRONIC OBSTRUCTIVE PULMONARY DISEASE, UNSPECIFIED COPD TYPE: ICD-10-CM

## 2022-02-20 DIAGNOSIS — F51.01 PRIMARY INSOMNIA: ICD-10-CM

## 2022-02-21 RX ORDER — AMITRIPTYLINE HYDROCHLORIDE 100 MG/1
100 TABLET, FILM COATED ORAL
Qty: 90 TABLET | Refills: 1 | Status: SHIPPED | OUTPATIENT
Start: 2022-02-21 | End: 2022-08-25

## 2022-02-21 RX ORDER — METOPROLOL TARTRATE 100 MG/1
100 TABLET ORAL 2 TIMES DAILY
Qty: 180 TABLET | Refills: 1 | Status: SHIPPED | OUTPATIENT
Start: 2022-02-21 | End: 2022-02-23

## 2022-02-21 RX ORDER — AMITRIPTYLINE HYDROCHLORIDE 100 MG/1
TABLET, FILM COATED ORAL
Qty: 90 TABLET | Refills: 1 | OUTPATIENT
Start: 2022-02-21

## 2022-02-21 RX ORDER — IPRATROPIUM BROMIDE AND ALBUTEROL SULFATE 2.5; .5 MG/3ML; MG/3ML
3 SOLUTION RESPIRATORY (INHALATION)
Qty: 360 ML | Refills: 3 | Status: SHIPPED | OUTPATIENT
Start: 2022-02-21

## 2022-02-21 NOTE — TELEPHONE ENCOUNTER
Rx Refill Note  Requested Prescriptions     Pending Prescriptions Disp Refills   • ipratropium-albuterol (DUO-NEB) 0.5-2.5 mg/3 ml nebulizer 360 mL 3     Sig: Take 3 mL by nebulization 4 (Four) Times a Day.   • metoprolol tartrate (LOPRESSOR) 100 MG tablet 180 tablet 1     Sig: Take 1 tablet by mouth 2 (Two) Times a Day.   • amitriptyline (ELAVIL) 100 MG tablet 90 tablet 1     Sig: Take 1 tablet by mouth every night at bedtime.      Last office visit with prescribing clinician: 12/30/2021      Next office visit with prescribing clinician: 3/31/2022            Familia Sibley  02/21/22, 07:37 EST

## 2022-02-22 DIAGNOSIS — I10 ESSENTIAL HYPERTENSION: ICD-10-CM

## 2022-02-23 RX ORDER — METOPROLOL TARTRATE 100 MG/1
TABLET ORAL
Qty: 180 TABLET | Refills: 1 | Status: SHIPPED | OUTPATIENT
Start: 2022-02-23 | End: 2022-05-26

## 2022-03-08 DIAGNOSIS — M15.9 PRIMARY OSTEOARTHRITIS INVOLVING MULTIPLE JOINTS: ICD-10-CM

## 2022-03-08 DIAGNOSIS — G89.4 CHRONIC PAIN SYNDROME: ICD-10-CM

## 2022-03-09 RX ORDER — HYDROCODONE BITARTRATE AND ACETAMINOPHEN 7.5; 325 MG/1; MG/1
1 TABLET ORAL EVERY 8 HOURS PRN
Qty: 90 TABLET | Refills: 0 | Status: SHIPPED | OUTPATIENT
Start: 2022-03-09 | End: 2022-03-31 | Stop reason: SDUPTHER

## 2022-03-09 NOTE — TELEPHONE ENCOUNTER
Rx Refill Note  Requested Prescriptions     Pending Prescriptions Disp Refills   • HYDROcodone-acetaminophen (NORCO) 7.5-325 MG per tablet 90 tablet 0     Sig: Take 1 tablet by mouth Every 8 (Eight) Hours As Needed for Moderate Pain .      Last office visit with prescribing clinician: 12/30/2021      Next office visit with prescribing clinician: 3/31/2022            Alma eDlia Vera MA  03/09/22, 13:13 EST

## 2022-03-31 ENCOUNTER — OFFICE VISIT (OUTPATIENT)
Dept: FAMILY MEDICINE CLINIC | Facility: CLINIC | Age: 67
End: 2022-03-31

## 2022-03-31 VITALS
SYSTOLIC BLOOD PRESSURE: 132 MMHG | BODY MASS INDEX: 27.32 KG/M2 | HEART RATE: 84 BPM | HEIGHT: 66 IN | DIASTOLIC BLOOD PRESSURE: 80 MMHG | WEIGHT: 170 LBS | TEMPERATURE: 98.4 F | OXYGEN SATURATION: 99 %

## 2022-03-31 DIAGNOSIS — G89.4 CHRONIC PAIN SYNDROME: ICD-10-CM

## 2022-03-31 DIAGNOSIS — M15.9 PRIMARY OSTEOARTHRITIS INVOLVING MULTIPLE JOINTS: ICD-10-CM

## 2022-03-31 DIAGNOSIS — N30.00 ACUTE CYSTITIS WITHOUT HEMATURIA: ICD-10-CM

## 2022-03-31 DIAGNOSIS — R39.9 URINARY SYMPTOM OR SIGN: Primary | ICD-10-CM

## 2022-03-31 DIAGNOSIS — E78.5 HYPERLIPIDEMIA, UNSPECIFIED HYPERLIPIDEMIA TYPE: ICD-10-CM

## 2022-03-31 LAB
BILIRUB BLD-MCNC: NEGATIVE MG/DL
CLARITY, POC: CLEAR
COLOR UR: YELLOW
EXPIRATION DATE: ABNORMAL
GLUCOSE UR STRIP-MCNC: NEGATIVE MG/DL
KETONES UR QL: NEGATIVE
LEUKOCYTE EST, POC: ABNORMAL
Lab: ABNORMAL
NITRITE UR-MCNC: NEGATIVE MG/ML
PH UR: 6.5 [PH] (ref 5–8)
PROT UR STRIP-MCNC: NEGATIVE MG/DL
RBC # UR STRIP: ABNORMAL /UL
SP GR UR: 1.01 (ref 1–1.03)
UROBILINOGEN UR QL: NORMAL

## 2022-03-31 PROCEDURE — 81003 URINALYSIS AUTO W/O SCOPE: CPT | Performed by: INTERNAL MEDICINE

## 2022-03-31 PROCEDURE — 99214 OFFICE O/P EST MOD 30 MIN: CPT | Performed by: INTERNAL MEDICINE

## 2022-03-31 RX ORDER — SULFAMETHOXAZOLE AND TRIMETHOPRIM 800; 160 MG/1; MG/1
1 TABLET ORAL 2 TIMES DAILY
Qty: 14 TABLET | Refills: 0 | Status: SHIPPED | OUTPATIENT
Start: 2022-03-31 | End: 2022-04-07

## 2022-03-31 RX ORDER — HYDROCODONE BITARTRATE AND ACETAMINOPHEN 7.5; 325 MG/1; MG/1
1 TABLET ORAL EVERY 8 HOURS PRN
Qty: 90 TABLET | Refills: 0 | Status: SHIPPED | OUTPATIENT
Start: 2022-03-31 | End: 2022-05-01 | Stop reason: SDUPTHER

## 2022-03-31 NOTE — PROGRESS NOTES
Subjective   Lula Decker is a 66 y.o. female.     Chief Complaint   Patient presents with   • chronic pain syndrome   • Urinary Tract Infection       History of Present Illness     States having pain on urination for the last 24 hours.  Increased frequency but no blood or discharge.  No abdomen pain, vomiting, diarrhea.  Has episodes of nausea lasting only 10 minutes and resolves for the last 4 months 2 times a week.  Denies chest pain, dizziness or palpitations associated.    Continues to have chronic pain in the back, shoulders and knee.  Using the hydrocodone as needed and averages 2-3 times per day.  Denies side effects or issues with the problems or medications.    History of hypertension.  Currently, has been feeling well and asymptomatic without any headaches,vision changes, cough, chest pain, shortness of breath, swelling, focal neurologic deficit, memory loss or syncope.  Has been taking the medications regularly and adherent with the regimen of metoprolol tartrate 100 mg twice a day.  Denies medication side effects and no significant interval events.      Follow-up for cholesterol.  Currently, has been feeling well without any myalgias, muscle aches, weakness, numbness, chest pain, short of breath or other issues.  Currently, is adherent with medication regimen of crestor 10 mg and denies medication side effects. Is due for lab follow-up since was changed to crestor in Jan 2022 due to insurance.    Continued knee pain that intermittently flares and is severe in the right.  Has seen ortho and only options are injections or surgery but patient continues to refuse these options at this time.     Follow-up with GERD and intermittent nausea lasting 5- 10 minutes then self resolves.  This has been a chronic issue for a long time and unchanged.  She continues with the pantoprazole 40 mg daily.    Patient with COPD and on oxygen nasal canula and using the inhaled medications is stable and continues to see  pulmonology.  Now on duo-neb, pulmicort and brovana.       The following portions of the patient's history were reviewed and updated as appropriate: allergies, current medications, past family history, past medical history, past social history, past surgical history and problem list.    Depression Screen:  PHQ-2/PHQ-9 Depression Screening 6/21/2021   Retired PHQ-9 Total Score 3   Retired Total Score 3       Past Medical History:   Diagnosis Date   • Abnormal findings on diagnostic imaging of abdomen    • Alopecia areata    • Asthma    • Bleeding external hemorrhoids    • BMI 25.0-25.9,adult    • Calf cramp    • Carpal tunnel syndrome    • Chest pain    • Chest wall pain    • Constipation    • COPD (chronic obstructive pulmonary disease) ([facility])    • COPD with acute exacerbation ([facility])    • Discoloration of skin of foot    • Dyslipidemia    • Dysuria    • Eczema    • Encounter for immunization    • Esophageal reflux    • Fatty liver    • Fibromyalgia, primary    • GERD (gastroesophageal reflux disease)    • Headache    • Hives of unknown origin    • Hyperactivity of bladder    • Hypertension    • IBS (irritable bowel syndrome)    • Incontinence of urine    • Insomnia    • Menopausal symptom    • Migraine    • MRSA exposure    • Muscle spasm    • Nocturia    • OA (osteoarthritis)    • Obstructive chronic bronchitis with acute exacerbation ([facility])    • Oral thrush    • Osteoarthritis of left knee    • Osteoporosis    • PAD (peripheral artery disease) ([facility])    • Sinusitis, acute    • Synovial cyst of popliteal space    • Tremor    • Tubular adenoma of colon    • Urinary frequency    • Urinary urgency    • Urinary, incontinence, stress female    • UTI (urinary tract infection)        Past Surgical History:   Procedure Laterality Date   • BREAST LUMPECTOMY     • BREAST SURGERY     • COLONOSCOPY     • EYE SURGERY  catract surgery   • HYSTERECTOMY      Not due to cancer   • NECK SURGERY     • NEUROPLASTY      Median nerve at  carpal tunnel.   • OOPHORECTOMY     • OTHER SURGICAL HISTORY  08/20/2014    Esophadogastroduodenoscopy - Gastritis    • TONSILLECTOMY     • TUBAL ABDOMINAL LIGATION         Family History   Problem Relation Age of Onset   • Heart failure Father    • Heart disease Father        Social History     Socioeconomic History   • Marital status:    Tobacco Use   • Smoking status: Current Every Day Smoker     Packs/day: 0.50     Years: 50.00     Pack years: 25.00     Types: Cigarettes   • Smokeless tobacco: Never Used   Substance and Sexual Activity   • Alcohol use: Yes     Alcohol/week: 0.0 standard drinks   • Drug use: No   • Sexual activity: Defer       Current Outpatient Medications   Medication Sig Dispense Refill   • acyclovir (ZOVIRAX) 800 MG tablet Take 1 tablet by mouth 5 (Five) Times a Day. 35 tablet 0   • albuterol sulfate HFA (ProAir HFA) 108 (90 Base) MCG/ACT inhaler Inhale 2 puffs Every 4 (Four) Hours As Needed for Wheezing or Shortness of Air. 8.5 g 6   • amitriptyline (ELAVIL) 100 MG tablet Take 1 tablet by mouth every night at bedtime. 90 tablet 1   • arformoterol (BROVANA) 15 MCG/2ML nebulizer solution Take  by nebulization 2 (Two) Times a Day.     • budesonide (PULMICORT) 0.5 MG/2ML nebulizer solution Take 0.5 mg by nebulization Daily.     • doxylamine (UNISOM) 25 MG tablet Take 1 tablet by mouth Daily.     • HYDROcodone-acetaminophen (NORCO) 7.5-325 MG per tablet Take 1 tablet by mouth Every 8 (Eight) Hours As Needed for Moderate Pain . 90 tablet 0   • hydrocortisone 2.5 % cream Apply 2.5 application topically to the appropriate area as directed 3 (Three) Times a Day.     • ipratropium-albuterol (DUO-NEB) 0.5-2.5 mg/3 ml nebulizer Take 3 mL by nebulization 4 (Four) Times a Day. 360 mL 3   • metoprolol tartrate (LOPRESSOR) 100 MG tablet TAKE 1 TABLET BY MOUTH TWICE A  tablet 1   • naproxen sodium (ALEVE) 220 MG tablet Take 220 mg by mouth 2 (Two) Times a Day As Needed.     • pantoprazole  "(PROTONIX) 40 MG EC tablet Take 1 tablet by mouth Daily. 90 tablet 1   • rosuvastatin (CRESTOR) 10 MG tablet Take 1 tablet by mouth Daily. 90 tablet 3   • triamcinolone (KENALOG) 0.1 % cream Apply  topically to the appropriate area as directed 2 (Two) Times a Day. 45 g 0   • vitamin B-12 (CYANOCOBALAMIN) 1000 MCG tablet Take 1,000 mcg by mouth Daily.     • sulfamethoxazole-trimethoprim (BACTRIM DS,SEPTRA DS) 800-160 MG per tablet Take 1 tablet by mouth 2 (Two) Times a Day. 14 tablet 0     No current facility-administered medications for this visit.       Review of Systems   Constitutional: Negative for activity change, appetite change, fatigue, fever, unexpected weight gain and unexpected weight loss.   HENT: Negative for nosebleeds, rhinorrhea, trouble swallowing and voice change.    Eyes: Negative for visual disturbance.   Respiratory: Positive for shortness of breath. Negative for cough, chest tightness and wheezing.    Cardiovascular: Negative for chest pain, palpitations and leg swelling.   Gastrointestinal: Positive for GERD. Negative for abdominal pain, blood in stool, constipation, diarrhea, nausea, vomiting and indigestion.   Genitourinary: Positive for dysuria. Negative for frequency and hematuria.   Musculoskeletal: Positive for arthralgias and back pain. Negative for myalgias.   Skin: Negative for rash and wound.   Neurological: Negative for dizziness, tremors, weakness, light-headedness, numbness, headache and memory problem.   Hematological: Negative for adenopathy. Does not bruise/bleed easily.   Psychiatric/Behavioral: Negative for sleep disturbance and depressed mood. The patient is not nervous/anxious.        Objective   /80 (BP Location: Left arm, Patient Position: Sitting, Cuff Size: Large Adult)   Pulse 84   Temp 98.4 °F (36.9 °C) (Temporal)   Ht 167.6 cm (65.98\")   Wt 77.1 kg (170 lb)   SpO2 99%   BMI 27.45 kg/m²     Physical Exam  Vitals and nursing note reviewed.   Constitutional: "       General: She is not in acute distress.     Appearance: She is well-developed. She is not diaphoretic.   HENT:      Head: Normocephalic and atraumatic.      Right Ear: External ear normal.      Left Ear: External ear normal.      Nose: Nose normal.   Eyes:      Conjunctiva/sclera: Conjunctivae normal.      Pupils: Pupils are equal, round, and reactive to light.   Neck:      Thyroid: No thyromegaly.      Trachea: No tracheal deviation.   Cardiovascular:      Rate and Rhythm: Normal rate and regular rhythm.      Heart sounds: Normal heart sounds. No murmur heard.    No friction rub. No gallop.   Pulmonary:      Effort: Pulmonary effort is normal. No respiratory distress.      Breath sounds: Normal breath sounds.      Comments: Using O2 2L / NC  Abdominal:      General: Bowel sounds are normal.      Palpations: Abdomen is soft. There is no mass.      Tenderness: There is no abdominal tenderness. There is no guarding.      Comments: Mild suprapubic tenderness   Musculoskeletal:         General: Normal range of motion.      Cervical back: Normal range of motion and neck supple.   Lymphadenopathy:      Cervical: No cervical adenopathy.   Skin:     General: Skin is warm and dry.      Capillary Refill: Capillary refill takes less than 2 seconds.      Findings: No rash.   Neurological:      Mental Status: She is alert and oriented to person, place, and time.      Motor: No abnormal muscle tone.      Deep Tendon Reflexes: Reflexes normal.   Psychiatric:         Behavior: Behavior normal.         Thought Content: Thought content normal.         Judgment: Judgment normal.         No results found for this or any previous visit (from the past 2016 hour(s)).  Assessment/Plan   Diagnoses and all orders for this visit:    1. Urinary symptom or sign (Primary)  -     POCT urinalysis dipstick, automated  -     Urine Culture - Urine, Urine, Clean Catch    2. Acute cystitis without hematuria  -     Urine Culture - Urine, Urine,  Clean Catch    3. Chronic pain syndrome  -     HYDROcodone-acetaminophen (NORCO) 7.5-325 MG per tablet; Take 1 tablet by mouth Every 8 (Eight) Hours As Needed for Moderate Pain .  Dispense: 90 tablet; Refill: 0    4. Primary osteoarthritis involving multiple joints  -     HYDROcodone-acetaminophen (NORCO) 7.5-325 MG per tablet; Take 1 tablet by mouth Every 8 (Eight) Hours As Needed for Moderate Pain .  Dispense: 90 tablet; Refill: 0    5. Hyperlipidemia, unspecified hyperlipidemia type  -     Comprehensive Metabolic Panel  -     Lipid Panel    Other orders  -     sulfamethoxazole-trimethoprim (BACTRIM DS,SEPTRA DS) 800-160 MG per tablet; Take 1 tablet by mouth 2 (Two) Times a Day.  Dispense: 14 tablet; Refill: 0      Chronic pain is stable and unchanged.  We will continue current medication unchanged.  ROGER run and reviewed.  Risks of the medication include but are not limited to fatigue, somnolence, increased risk of falls, constipation, allergic reaction, dependence, and addiction.  Patient symptomatic and appears to have a urinary tract infection based on the urinalysis.  Will obtain a culture but treat in the interim with sulfamethoxazole-trimethoprim x7 days.  We will check the labs as ordered above secondary to the hyperlipidemia be sure no issues secondary to the medications.         · COVID-19 Precautions - Patient was compliant in wearing a mask. When I saw the patient, I used appropriate personal protective equipment (PPE) including mask and eye shield (standard procedure).  Additionally, I used gown and gloves if indicated.  Hand hygiene was completed before and after seeing the patient.  · Dictated utilizing Dragon Dictation

## 2022-04-01 LAB
ALBUMIN SERPL-MCNC: 4.8 G/DL (ref 3.8–4.8)
ALBUMIN/GLOB SERPL: 1.8 {RATIO} (ref 1.2–2.2)
ALP SERPL-CCNC: 81 IU/L (ref 44–121)
ALT SERPL-CCNC: 16 IU/L (ref 0–32)
AST SERPL-CCNC: 22 IU/L (ref 0–40)
BILIRUB SERPL-MCNC: 0.3 MG/DL (ref 0–1.2)
BUN SERPL-MCNC: 9 MG/DL (ref 8–27)
BUN/CREAT SERPL: 11 (ref 12–28)
CALCIUM SERPL-MCNC: 9.5 MG/DL (ref 8.7–10.3)
CHLORIDE SERPL-SCNC: 97 MMOL/L (ref 96–106)
CHOLEST SERPL-MCNC: 203 MG/DL (ref 100–199)
CO2 SERPL-SCNC: 26 MMOL/L (ref 20–29)
CREAT SERPL-MCNC: 0.79 MG/DL (ref 0.57–1)
EGFRCR SERPLBLD CKD-EPI 2021: 82 ML/MIN/1.73
GLOBULIN SER CALC-MCNC: 2.6 G/DL (ref 1.5–4.5)
GLUCOSE SERPL-MCNC: 80 MG/DL (ref 65–99)
HDLC SERPL-MCNC: 57 MG/DL
LDLC SERPL CALC-MCNC: 108 MG/DL (ref 0–99)
POTASSIUM SERPL-SCNC: 4.2 MMOL/L (ref 3.5–5.2)
PROT SERPL-MCNC: 7.4 G/DL (ref 6–8.5)
SODIUM SERPL-SCNC: 139 MMOL/L (ref 134–144)
TRIGL SERPL-MCNC: 222 MG/DL (ref 0–149)
VLDLC SERPL CALC-MCNC: 38 MG/DL (ref 5–40)

## 2022-04-05 LAB
BACTERIA UR CULT: ABNORMAL
BACTERIA UR CULT: ABNORMAL
OTHER ANTIBIOTIC SUSC ISLT: ABNORMAL

## 2022-04-07 ENCOUNTER — OFFICE VISIT (OUTPATIENT)
Dept: FAMILY MEDICINE CLINIC | Facility: CLINIC | Age: 67
End: 2022-04-07

## 2022-04-07 DIAGNOSIS — J44.1 CHRONIC OBSTRUCTIVE PULMONARY DISEASE WITH ACUTE EXACERBATION: Primary | ICD-10-CM

## 2022-04-07 PROCEDURE — 99213 OFFICE O/P EST LOW 20 MIN: CPT | Performed by: NURSE PRACTITIONER

## 2022-04-07 RX ORDER — AZITHROMYCIN 250 MG/1
TABLET, FILM COATED ORAL
Qty: 6 TABLET | Refills: 0 | Status: SHIPPED | OUTPATIENT
Start: 2022-04-07 | End: 2022-07-18

## 2022-04-07 NOTE — PROGRESS NOTES
Subjective   Lula Decker is a 66 y.o. female.     Chief Complaint   Patient presents with   • COPD     Productive cough     You have chosen to receive care through a telephone visit. Do you consent to use a telephone visit for your medical care today? Yes    History of Present Illness   Patient presents with c/o COPD flare; has had fatigue when showering; has had similar symptoms in past with COPD flare; has had productive cough, more than usual; sputum is cream color; no blood tinged sputum; no fever; has SOA, particularly with activity; on O2 at 3 liters daily; uses Brovana/Pulmicort nebulizer twice daily; also uses DuoNeb mid day daily; nebulizer helps SOA and cough; no ear pain; no sore throat; no vomiting or diarrhea; has had brief episodes of nausea on and off for last 2-3 months; no OTC treatment.    Recently finished antibiotic (Bactrim) for UTI yesterday; felt worse and was feverish with cold chills when first started taking Bactrim, but resolved; urinary symptoms have resolved.     The following portions of the patient's history were reviewed and updated as appropriate: allergies, current medications, past family history, past medical history, past social history, past surgical history and problem list.    Current Outpatient Medications on File Prior to Visit   Medication Sig   • albuterol sulfate HFA (ProAir HFA) 108 (90 Base) MCG/ACT inhaler Inhale 2 puffs Every 4 (Four) Hours As Needed for Wheezing or Shortness of Air.   • amitriptyline (ELAVIL) 100 MG tablet Take 1 tablet by mouth every night at bedtime.   • arformoterol (BROVANA) 15 MCG/2ML nebulizer solution Take  by nebulization 2 (Two) Times a Day.   • budesonide (PULMICORT) 0.5 MG/2ML nebulizer solution Take 0.5 mg by nebulization Daily.   • doxylamine (UNISOM) 25 MG tablet Take 1 tablet by mouth At Night As Needed.   • HYDROcodone-acetaminophen (NORCO) 7.5-325 MG per tablet Take 1 tablet by mouth Every 8 (Eight) Hours As Needed for Moderate Pain  .   • hydrocortisone 2.5 % cream Apply 2.5 application topically to the appropriate area as directed 3 (Three) Times a Day.   • ipratropium-albuterol (DUO-NEB) 0.5-2.5 mg/3 ml nebulizer Take 3 mL by nebulization 4 (Four) Times a Day.   • metoprolol tartrate (LOPRESSOR) 100 MG tablet TAKE 1 TABLET BY MOUTH TWICE A DAY   • pantoprazole (PROTONIX) 40 MG EC tablet Take 1 tablet by mouth Daily.   • rosuvastatin (CRESTOR) 10 MG tablet Take 1 tablet by mouth Daily.   • triamcinolone (KENALOG) 0.1 % cream Apply  topically to the appropriate area as directed 2 (Two) Times a Day.   • vitamin B-12 (CYANOCOBALAMIN) 1000 MCG tablet Take 1,000 mcg by mouth Daily.   • acyclovir (ZOVIRAX) 800 MG tablet Take 1 tablet by mouth 5 (Five) Times a Day.     No current facility-administered medications on file prior to visit.        Past Medical History:   Diagnosis Date   • Abnormal findings on diagnostic imaging of abdomen    • Alopecia areata    • Asthma    • Bleeding external hemorrhoids    • BMI 25.0-25.9,adult    • Calf cramp    • Carpal tunnel syndrome    • Chest pain    • Chest wall pain    • Constipation    • COPD (chronic obstructive pulmonary disease) ([facility]    • COPD with acute exacerbation ([facility])    • Discoloration of skin of foot    • Dyslipidemia    • Dysuria    • Eczema    • Encounter for immunization    • Esophageal reflux    • Fatty liver    • Fibromyalgia, primary    • GERD (gastroesophageal reflux disease)    • Headache    • Hives of unknown origin    • Hyperactivity of bladder    • Hypertension    • IBS (irritable bowel syndrome)    • Incontinence of urine    • Insomnia    • Menopausal symptom    • Migraine    • MRSA exposure    • Muscle spasm    • Nocturia    • OA (osteoarthritis)    • Obstructive chronic bronchitis with acute exacerbation ([facility])    • Oral thrush    • Osteoarthritis of left knee    • Osteoporosis    • PAD (peripheral artery disease) ([facility])    • Sinusitis, acute    • Synovial cyst of popliteal space    •  Tremor    • Tubular adenoma of colon    • Urinary frequency    • Urinary urgency    • Urinary, incontinence, stress female    • UTI (urinary tract infection)        Past Surgical History:   Procedure Laterality Date   • BREAST LUMPECTOMY     • BREAST SURGERY     • COLONOSCOPY     • EYE SURGERY  catract surgery   • HYSTERECTOMY      Not due to cancer   • NECK SURGERY     • NEUROPLASTY      Median nerve at carpal tunnel.   • OOPHORECTOMY     • OTHER SURGICAL HISTORY  08/20/2014    Esophadogastroduodenoscopy - Gastritis    • TONSILLECTOMY     • TUBAL ABDOMINAL LIGATION         Family History   Problem Relation Age of Onset   • Heart failure Father    • Heart disease Father        Social History     Socioeconomic History   • Marital status:    Tobacco Use   • Smoking status: Current Every Day Smoker     Packs/day: 0.50     Years: 50.00     Pack years: 25.00     Types: Cigarettes   • Smokeless tobacco: Never Used   Substance and Sexual Activity   • Alcohol use: Yes     Alcohol/week: 0.0 standard drinks   • Drug use: No   • Sexual activity: Defer       Review of Systems   Constitutional: Positive for fatigue. Negative for appetite change (some last week, has not eaten much overall in the last week), fever, unexpected weight gain and unexpected weight loss.   HENT: Positive for rhinorrhea. Negative for sinus pressure and trouble swallowing. Postnasal drip: some at times.    Eyes: Negative for blurred vision and discharge.   Respiratory: Positive for cough. Negative for chest tightness. Shortness of breath: see HPI.    Cardiovascular: Negative for palpitations. Chest pain: no change with chronic discomfort. Leg swelling: some in feet and ankles, no change.   Gastrointestinal: Negative for abdominal pain.   Genitourinary: Negative for dysuria and frequency.   Musculoskeletal: Back pain: no change in chronic discomfort.   Skin: Negative for rash.   Neurological: Negative for dizziness and headache. Light-headedness:  some at times.       Objective   Vitals:    04/07/22 1720   BP: 116/73   Pulse: 78   SpO2: 98%     There is no height or weight on file to calculate BMI.    Physical Exam  Constitutional:       General: She is not in acute distress.  Pulmonary:      Effort: Pulmonary effort is normal. No respiratory distress. Accessory muscle usage: some increased work of breathing at times.   Neurological:      Mental Status: She is alert and oriented to person, place, and time.   Psychiatric:         Mood and Affect: Mood normal.         Thought Content: Thought content normal.         Judgment: Judgment normal.     This was a telephone visit.    Lab Results   Component Value Date    WBC 8.0 03/08/2021    RBC 4.54 03/08/2021    HGB 13.8 03/08/2021    HCT 42.0 03/08/2021    MCV 93 03/08/2021    MCH 30.4 03/08/2021    MCHC 32.9 03/08/2021    RDW 13.7 03/08/2021    MPV 8.3 12/12/2018     03/08/2021    NEUTRORELPCT 40 03/08/2021    LYMPHORELPCT 43 03/08/2021    MONORELPCT 13 03/08/2021    EOSRELPCT 2 03/08/2021    BASORELPCT 1 03/08/2021    AUTOIGPER 0.3 (H) 12/12/2018    NEUTROABS 3.2 03/08/2021    LYMPHSABS 3.5 (H) 03/08/2021    MONOSABS 1.0 (H) 03/08/2021    EOSABS 0.2 03/08/2021    BASOSABS 0.1 03/08/2021    AUTOIGNUM 0.02 12/12/2018    NRBC 0 12/12/2018     Lab Results   Component Value Date    GLUCOSE 80 03/31/2022    BUN 9 03/31/2022    CREATININE 0.79 03/31/2022    EGFRIFNONA 84 03/08/2021    EGFRIFAFRI 97 03/08/2021    BCR 11 (L) 03/31/2022    K 4.2 03/31/2022    CO2 26 03/31/2022    CALCIUM 9.5 03/31/2022    PROTENTOTREF 7.4 03/31/2022    ALBUMIN 4.8 03/31/2022    LABIL2 1.8 03/31/2022    AST 22 03/31/2022    ALT 16 03/31/2022      Lab Results   Component Value Date    CHLPL 203 (H) 03/31/2022    TRIG 222 (H) 03/31/2022    HDL 57 03/31/2022    VLDL 38 03/31/2022     (H) 03/31/2022     Lab Results   Component Value Date    TSH 1.870 03/08/2021         Assessment/Plan .  Problem List Items Addressed This Visit      COPD (chronic obstructive pulmonary disease) (HCC) - Primary    Current Assessment & Plan     COPD is worsening.  Medication changes per orders.   Make sure drinking adequate liquids.  Continue Brovana and Pulmicort nebulizer twice daily and DuoNeb midday.           Relevant Medications    azithromycin (Zithromax Z-Maximo) 250 MG tablet          Return if symptoms worsen or fail to improve.     Time spent doing video visit, reviewing, discussing, answering questions, and educating patient was 20 minutes.

## 2022-04-08 VITALS — DIASTOLIC BLOOD PRESSURE: 73 MMHG | HEART RATE: 78 BPM | OXYGEN SATURATION: 98 % | SYSTOLIC BLOOD PRESSURE: 116 MMHG

## 2022-04-08 NOTE — ASSESSMENT & PLAN NOTE
COPD is worsening.  Medication changes per orders.   Make sure drinking adequate liquids.  Continue Brovana and Pulmicort nebulizer twice daily and DuoNeb midday.

## 2022-04-08 NOTE — PATIENT INSTRUCTIONS
Make sure drinking adequate liquids.  Continue Brovana and Pulmicort nebulizer twice daily and DuoNeb midday.  Follow up if symptoms persist or worsen.

## 2022-05-01 DIAGNOSIS — G89.4 CHRONIC PAIN SYNDROME: ICD-10-CM

## 2022-05-01 DIAGNOSIS — M15.9 PRIMARY OSTEOARTHRITIS INVOLVING MULTIPLE JOINTS: ICD-10-CM

## 2022-05-02 RX ORDER — HYDROCODONE BITARTRATE AND ACETAMINOPHEN 7.5; 325 MG/1; MG/1
1 TABLET ORAL EVERY 8 HOURS PRN
Qty: 90 TABLET | Refills: 0 | Status: SHIPPED | OUTPATIENT
Start: 2022-05-02 | End: 2022-05-29 | Stop reason: SDUPTHER

## 2022-05-02 NOTE — TELEPHONE ENCOUNTER
Rx Refill Note  Requested Prescriptions     Pending Prescriptions Disp Refills   • HYDROcodone-acetaminophen (NORCO) 7.5-325 MG per tablet 90 tablet 0     Sig: Take 1 tablet by mouth Every 8 (Eight) Hours As Needed for Moderate Pain .      Last office visit with prescribing clinician: 3/31/2022      Next office visit with prescribing clinician: Visit date not found            Alma Delia Vera MA  05/02/22, 16:03 EDT

## 2022-05-26 DIAGNOSIS — I10 ESSENTIAL HYPERTENSION: ICD-10-CM

## 2022-05-26 RX ORDER — METOPROLOL TARTRATE 100 MG/1
100 TABLET ORAL 2 TIMES DAILY
Qty: 180 TABLET | Refills: 0 | Status: SHIPPED | OUTPATIENT
Start: 2022-05-26 | End: 2022-11-28

## 2022-05-26 NOTE — TELEPHONE ENCOUNTER
Rx Refill Note  Requested Prescriptions     Pending Prescriptions Disp Refills   • metoprolol tartrate (LOPRESSOR) 100 MG tablet [Pharmacy Med Name: METOPROLOL TARTRATE 100MG TABLETS] 180 tablet 1     Sig: TAKE 1 TABLET BY MOUTH TWICE DAILY      Last office visit with prescribing clinician: 3/31/2022      Next office visit with prescribing clinician: due 6/30/22 for DYLON Richardson MA  05/26/22, 13:38 EDT

## 2022-05-29 DIAGNOSIS — M15.9 PRIMARY OSTEOARTHRITIS INVOLVING MULTIPLE JOINTS: ICD-10-CM

## 2022-05-29 DIAGNOSIS — G89.4 CHRONIC PAIN SYNDROME: ICD-10-CM

## 2022-05-31 RX ORDER — HYDROCODONE BITARTRATE AND ACETAMINOPHEN 7.5; 325 MG/1; MG/1
1 TABLET ORAL EVERY 8 HOURS PRN
Qty: 90 TABLET | Refills: 0 | Status: SHIPPED | OUTPATIENT
Start: 2022-05-31 | End: 2022-06-28 | Stop reason: SDUPTHER

## 2022-05-31 NOTE — TELEPHONE ENCOUNTER
Rx Refill Note  Requested Prescriptions     Pending Prescriptions Disp Refills   • HYDROcodone-acetaminophen (NORCO) 7.5-325 MG per tablet 90 tablet 0     Sig: Take 1 tablet by mouth Every 8 (Eight) Hours As Needed for Moderate Pain .      Last office visit with prescribing clinician: 3/31/2022      Next office visit with prescribing clinician: Visit date not found            Bartolo Franco MA  05/31/22, 09:17 EDT

## 2022-06-16 DIAGNOSIS — E78.5 HYPERLIPIDEMIA, UNSPECIFIED HYPERLIPIDEMIA TYPE: ICD-10-CM

## 2022-06-16 RX ORDER — ROSUVASTATIN CALCIUM 10 MG/1
TABLET, COATED ORAL
Qty: 90 TABLET | Refills: 3 | Status: SHIPPED | OUTPATIENT
Start: 2022-06-16

## 2022-06-16 NOTE — TELEPHONE ENCOUNTER
Rx Refill Note  Requested Prescriptions     Pending Prescriptions Disp Refills   • rosuvastatin (CRESTOR) 10 MG tablet [Pharmacy Med Name: ROSUVASTATIN 10MG TABLETS] 90 tablet 3     Sig: TAKE 1 TABLET BY MOUTH EVERY DAY      Last office visit with prescribing clinician: 3/31/2022      Next office visit with prescribing clinician: Visit date not found            Alma Delia Vera MA  06/16/22, 13:32 EDT

## 2022-06-28 DIAGNOSIS — M15.9 PRIMARY OSTEOARTHRITIS INVOLVING MULTIPLE JOINTS: ICD-10-CM

## 2022-06-28 DIAGNOSIS — G89.4 CHRONIC PAIN SYNDROME: ICD-10-CM

## 2022-06-28 RX ORDER — HYDROCODONE BITARTRATE AND ACETAMINOPHEN 7.5; 325 MG/1; MG/1
1 TABLET ORAL EVERY 8 HOURS PRN
Qty: 90 TABLET | Refills: 0 | Status: SHIPPED | OUTPATIENT
Start: 2022-06-28 | End: 2022-07-31 | Stop reason: SDUPTHER

## 2022-07-18 ENCOUNTER — OFFICE VISIT (OUTPATIENT)
Dept: FAMILY MEDICINE CLINIC | Facility: CLINIC | Age: 67
End: 2022-07-18

## 2022-07-18 VITALS
WEIGHT: 174 LBS | TEMPERATURE: 98 F | DIASTOLIC BLOOD PRESSURE: 84 MMHG | SYSTOLIC BLOOD PRESSURE: 132 MMHG | OXYGEN SATURATION: 97 % | HEIGHT: 66 IN | HEART RATE: 84 BPM | BODY MASS INDEX: 27.97 KG/M2

## 2022-07-18 DIAGNOSIS — R21 FACIAL RASH: Primary | ICD-10-CM

## 2022-07-18 DIAGNOSIS — L24.0 IRRITANT CONTACT DERMATITIS DUE TO DETERGENT: ICD-10-CM

## 2022-07-18 DIAGNOSIS — Z53.20 MAMMOGRAM DECLINED: ICD-10-CM

## 2022-07-18 DIAGNOSIS — M15.9 PRIMARY OSTEOARTHRITIS INVOLVING MULTIPLE JOINTS: ICD-10-CM

## 2022-07-18 DIAGNOSIS — G89.4 CHRONIC PAIN SYNDROME: ICD-10-CM

## 2022-07-18 DIAGNOSIS — Z23 IMMUNIZATION DUE: ICD-10-CM

## 2022-07-18 PROBLEM — L25.9 CONTACT DERMATITIS: Status: ACTIVE | Noted: 2022-07-18

## 2022-07-18 PROCEDURE — 91305 COVID-19 (PFIZER) 12+ YRS: CPT | Performed by: INTERNAL MEDICINE

## 2022-07-18 PROCEDURE — 99213 OFFICE O/P EST LOW 20 MIN: CPT | Performed by: INTERNAL MEDICINE

## 2022-07-18 PROCEDURE — 0051A COVID-19 (PFIZER) 12+ YRS: CPT | Performed by: INTERNAL MEDICINE

## 2022-07-18 NOTE — PROGRESS NOTES
Subjective   Lula Decker is a 67 y.o. female.     Chief Complaint   Patient presents with   • chronic pain syndrome   • Skin Problem       History of Present Illness   Answers for HPI/ROS submitted by the patient on 7/16/2022  What is the primary reason for your visit?: Rash    Patient has been having a breaking out of a rash on the cheeks and neck that is over the area the mask sits.  Using clindamycin from the dermatologist with no relief.    Continues to have chronic pain in the back, shoulders and knee.  Using the hydrocodone as needed and averages 2-3 times per day.  Denies side effects or issues with the problems or medications.     History of hypertension.  Currently, has been feeling well and asymptomatic without any headaches,vision changes, cough, chest pain, shortness of breath, swelling, focal neurologic deficit, memory loss or syncope.  Has been taking the medications regularly and adherent with the regimen of metoprolol tartrate 100 mg twice a day.  Denies medication side effects and no significant interval events.      Follow-up for cholesterol.  Currently, has been feeling well without any myalgias, muscle aches, weakness, numbness, chest pain, short of breath or other issues.  Currently, is adherent with medication regimen of crestor 10 mg and denies medication side effects. Is due for lab follow-up since was changed to crestor in Jan 2022 due to insurance.     Continued knee pain that intermittently flares and is severe in the right.  Has seen ortho and only options are injections or surgery but patient continues to refuse these options at this time.     Follow-up with GERD and intermittent nausea lasting 5- 10 minutes then self resolves.  This has been a chronic issue for a long time and unchanged.  She continues with the pantoprazole 40 mg daily.     Patient with COPD and on oxygen nasal canula and using the inhaled medications is stable and continues to see pulmonology.  Now on duo-neb,  pulmicort and brovana.          The following portions of the patient's history were reviewed and updated as appropriate: allergies, current medications, past family history, past medical history, past social history, past surgical history and problem list.    Depression Screen:  PHQ-2/PHQ-9 Depression Screening 6/21/2021   Retired PHQ-9 Total Score 3   Retired Total Score 3       Past Medical History:   Diagnosis Date   • Abnormal findings on diagnostic imaging of abdomen    • Alopecia areata    • Asthma    • Bleeding external hemorrhoids    • BMI 25.0-25.9,adult    • Calf cramp    • Carpal tunnel syndrome    • Chest pain    • Chest wall pain    • Constipation    • COPD (chronic obstructive pulmonary disease) ([facility])    • COPD with acute exacerbation ([facility])    • Discoloration of skin of foot    • Dyslipidemia    • Dysuria    • Eczema    • Encounter for immunization    • Esophageal reflux    • Fatty liver    • Fibromyalgia, primary    • GERD (gastroesophageal reflux disease)    • Headache    • Hives of unknown origin    • Hyperactivity of bladder    • Hypertension    • IBS (irritable bowel syndrome)    • Incontinence of urine    • Insomnia    • Menopausal symptom    • Migraine    • MRSA exposure    • Muscle spasm    • Nocturia    • OA (osteoarthritis)    • Obstructive chronic bronchitis with acute exacerbation ([facility])    • Oral thrush    • Osteoarthritis of left knee    • Osteoporosis    • PAD (peripheral artery disease) ([facility])    • Sinusitis, acute    • Synovial cyst of popliteal space    • Tremor    • Tubular adenoma of colon    • Urinary frequency    • Urinary urgency    • Urinary, incontinence, stress female    • UTI (urinary tract infection)        Past Surgical History:   Procedure Laterality Date   • BREAST LUMPECTOMY     • BREAST SURGERY     • COLONOSCOPY     • EYE SURGERY  catract surgery   • HYSTERECTOMY      Not due to cancer   • NECK SURGERY     • NEUROPLASTY      Median nerve at carpal tunnel.   •  OOPHORECTOMY     • OTHER SURGICAL HISTORY  08/20/2014    Esophadogastroduodenoscopy - Gastritis    • TONSILLECTOMY     • TUBAL ABDOMINAL LIGATION         Family History   Problem Relation Age of Onset   • Heart failure Father    • Heart disease Father        Social History     Socioeconomic History   • Marital status:    Tobacco Use   • Smoking status: Current Every Day Smoker     Packs/day: 0.50     Years: 50.00     Pack years: 25.00     Types: Cigarettes, Cigarettes   • Smokeless tobacco: Never Used   Substance and Sexual Activity   • Alcohol use: Yes   • Drug use: No   • Sexual activity: Defer       Current Outpatient Medications   Medication Sig Dispense Refill   • acyclovir (ZOVIRAX) 800 MG tablet Take 1 tablet by mouth 5 (Five) Times a Day. 35 tablet 0   • albuterol sulfate HFA (ProAir HFA) 108 (90 Base) MCG/ACT inhaler Inhale 2 puffs Every 4 (Four) Hours As Needed for Wheezing or Shortness of Air. 8.5 g 6   • amitriptyline (ELAVIL) 100 MG tablet Take 1 tablet by mouth every night at bedtime. 90 tablet 1   • arformoterol (BROVANA) 15 MCG/2ML nebulizer solution Take  by nebulization 2 (Two) Times a Day.     • budesonide (PULMICORT) 0.5 MG/2ML nebulizer solution Take 0.5 mg by nebulization Daily.     • doxylamine (UNISOM) 25 MG tablet Take 1 tablet by mouth At Night As Needed.     • HYDROcodone-acetaminophen (NORCO) 7.5-325 MG per tablet Take 1 tablet by mouth Every 8 (Eight) Hours As Needed for Moderate Pain . 90 tablet 0   • hydrocortisone 2.5 % cream Apply 2.5 application topically to the appropriate area as directed 3 (Three) Times a Day.     • ipratropium-albuterol (DUO-NEB) 0.5-2.5 mg/3 ml nebulizer Take 3 mL by nebulization 4 (Four) Times a Day. 360 mL 3   • metoprolol tartrate (LOPRESSOR) 100 MG tablet Take 1 tablet by mouth 2 (Two) Times a Day. 180 tablet 0   • pantoprazole (PROTONIX) 40 MG EC tablet Take 1 tablet by mouth Daily. 90 tablet 1   • rosuvastatin (CRESTOR) 10 MG tablet TAKE 1 TABLET  "BY MOUTH EVERY DAY 90 tablet 3   • triamcinolone (KENALOG) 0.1 % cream Apply  topically to the appropriate area as directed 2 (Two) Times a Day. 45 g 0   • vitamin B-12 (CYANOCOBALAMIN) 1000 MCG tablet Take 1,000 mcg by mouth Daily.       No current facility-administered medications for this visit.       Review of Systems   Constitutional: Positive for fatigue. Negative for activity change, appetite change, fever, unexpected weight gain and unexpected weight loss.   HENT: Positive for congestion and rhinorrhea. Negative for nosebleeds, trouble swallowing and voice change.    Eyes: Negative for pain and visual disturbance.   Respiratory: Positive for cough and shortness of breath. Negative for chest tightness and wheezing.    Cardiovascular: Negative for chest pain, palpitations and leg swelling.   Gastrointestinal: Positive for GERD. Negative for abdominal pain, blood in stool, constipation, diarrhea, nausea, vomiting and indigestion.   Genitourinary: Negative for dysuria, frequency and hematuria.   Musculoskeletal: Positive for arthralgias and back pain. Negative for myalgias.   Skin: Positive for rash. Negative for wound.   Neurological: Negative for dizziness, tremors, weakness, light-headedness, numbness, headache and memory problem.   Hematological: Negative for adenopathy. Does not bruise/bleed easily.   Psychiatric/Behavioral: Negative for sleep disturbance and depressed mood. The patient is not nervous/anxious.        Objective   /84 (BP Location: Left arm, Patient Position: Sitting, Cuff Size: Adult)   Pulse 84   Temp 98 °F (36.7 °C) (Temporal)   Ht 167.6 cm (65.98\")   Wt 78.9 kg (174 lb)   SpO2 97%   BMI 28.10 kg/m²     Physical Exam  Vitals and nursing note reviewed.   Constitutional:       General: She is not in acute distress.     Appearance: She is well-developed. She is not diaphoretic.   HENT:      Head: Normocephalic and atraumatic.      Right Ear: External ear normal.      Left Ear: " External ear normal.      Nose: Nose normal.   Eyes:      Conjunctiva/sclera: Conjunctivae normal.      Pupils: Pupils are equal, round, and reactive to light.   Neck:      Thyroid: No thyromegaly.      Trachea: No tracheal deviation.   Cardiovascular:      Rate and Rhythm: Normal rate and regular rhythm.      Heart sounds: Normal heart sounds. No murmur heard.    No friction rub. No gallop.   Pulmonary:      Effort: Pulmonary effort is normal. No respiratory distress.      Breath sounds: Normal breath sounds.   Abdominal:      General: Bowel sounds are normal.      Palpations: Abdomen is soft. There is no mass.      Tenderness: There is no abdominal tenderness. There is no guarding.   Musculoskeletal:         General: Normal range of motion.      Cervical back: Normal range of motion and neck supple.   Lymphadenopathy:      Cervical: No cervical adenopathy.   Skin:     General: Skin is warm and dry.      Capillary Refill: Capillary refill takes less than 2 seconds.      Findings: No rash.      Comments: Rash on face under the face covering area and upper neck.   Neurological:      Mental Status: She is alert and oriented to person, place, and time.      Motor: No abnormal muscle tone.      Deep Tendon Reflexes: Reflexes normal.   Psychiatric:         Behavior: Behavior normal.         Thought Content: Thought content normal.         Judgment: Judgment normal.         No results found for this or any previous visit (from the past 2016 hour(s)).  Assessment & Plan   Diagnoses and all orders for this visit:    1. Facial rash (Primary)    2. Irritant contact dermatitis due to detergent    3. Chronic pain syndrome    4. Primary osteoarthritis involving multiple joints    5. Immunization due  -     COVID-19 Vaccine (Pfizer) Gray Cap    6. Mammogram declined    Discussed with the patient the facial rash appears to be more of an irritant contact dermatitis from her mask covering.  I recommend changing the detergent that  she has been using which has been more of a dishwashing soap.  Rinsing it completely and observing.  Taking Uma as frequently as she can to keep from the heat irritating.  The primary osteoarthritis and chronic involving multiple joints and the chronic pain syndrome is stable and unchanged.  We will continue with the current medications.  ROGER run and reviewed.  Risks of the medication include but are not limited to fatigue, somnolence, increased risk of falls, constipation, allergic reaction, dependence, and addiction.  Immunace cessation discussed and will give the COVID booster today.  Also discussed with her the mammogram she declines at this time but will revisit this in about 6 months.         · COVID-19 Precautions - Patient was compliant in wearing a mask. When I saw the patient, I used appropriate personal protective equipment (PPE) including mask and eye shield (standard procedure).  Additionally, I used gown and gloves if indicated.  Hand hygiene was completed before and after seeing the patient.  · Dictated utilizing Dragon Dictation

## 2022-07-31 DIAGNOSIS — G89.4 CHRONIC PAIN SYNDROME: ICD-10-CM

## 2022-07-31 DIAGNOSIS — M15.9 PRIMARY OSTEOARTHRITIS INVOLVING MULTIPLE JOINTS: ICD-10-CM

## 2022-08-01 RX ORDER — HYDROCODONE BITARTRATE AND ACETAMINOPHEN 7.5; 325 MG/1; MG/1
1 TABLET ORAL EVERY 8 HOURS PRN
Qty: 90 TABLET | Refills: 0 | Status: SHIPPED | OUTPATIENT
Start: 2022-08-01 | End: 2022-08-24 | Stop reason: SDUPTHER

## 2022-08-01 NOTE — TELEPHONE ENCOUNTER
Rx Refill Note  Requested Prescriptions     Pending Prescriptions Disp Refills   • HYDROcodone-acetaminophen (NORCO) 7.5-325 MG per tablet 90 tablet 0     Sig: Take 1 tablet by mouth Every 8 (Eight) Hours As Needed for Moderate Pain .      Last office visit with prescribing clinician: 7/18/2022      Next office visit with prescribing clinician: 10/27/2022            Alma Delia Vera MA  08/01/22, 08:30 EDT

## 2022-08-24 DIAGNOSIS — M15.9 PRIMARY OSTEOARTHRITIS INVOLVING MULTIPLE JOINTS: ICD-10-CM

## 2022-08-24 DIAGNOSIS — K21.9 GASTROESOPHAGEAL REFLUX DISEASE WITHOUT ESOPHAGITIS: ICD-10-CM

## 2022-08-24 DIAGNOSIS — F51.01 PRIMARY INSOMNIA: ICD-10-CM

## 2022-08-24 DIAGNOSIS — G89.4 CHRONIC PAIN SYNDROME: ICD-10-CM

## 2022-08-25 RX ORDER — AMITRIPTYLINE HYDROCHLORIDE 100 MG/1
100 TABLET, FILM COATED ORAL
Qty: 90 TABLET | Refills: 1 | OUTPATIENT
Start: 2022-08-25

## 2022-08-25 RX ORDER — PANTOPRAZOLE SODIUM 40 MG/1
40 TABLET, DELAYED RELEASE ORAL DAILY
Qty: 90 TABLET | Refills: 1 | OUTPATIENT
Start: 2022-08-25

## 2022-08-25 RX ORDER — HYDROCODONE BITARTRATE AND ACETAMINOPHEN 7.5; 325 MG/1; MG/1
1 TABLET ORAL EVERY 8 HOURS PRN
Qty: 90 TABLET | Refills: 0 | Status: SHIPPED | OUTPATIENT
Start: 2022-08-25 | End: 2022-10-02 | Stop reason: SDUPTHER

## 2022-08-25 RX ORDER — AMITRIPTYLINE HYDROCHLORIDE 100 MG/1
TABLET, FILM COATED ORAL
Qty: 90 TABLET | Refills: 1 | Status: SHIPPED | OUTPATIENT
Start: 2022-08-25 | End: 2023-02-19 | Stop reason: SDUPTHER

## 2022-08-25 RX ORDER — PANTOPRAZOLE SODIUM 40 MG/1
TABLET, DELAYED RELEASE ORAL
Qty: 90 TABLET | Refills: 1 | Status: SHIPPED | OUTPATIENT
Start: 2022-08-25 | End: 2023-02-19 | Stop reason: SDUPTHER

## 2022-08-25 NOTE — TELEPHONE ENCOUNTER
Is hydrocodone okay to refill? Please advise     Rx Refill Note  Requested Prescriptions     Pending Prescriptions Disp Refills   • HYDROcodone-acetaminophen (NORCO) 7.5-325 MG per tablet 90 tablet 0     Sig: Take 1 tablet by mouth Every 8 (Eight) Hours As Needed for Moderate Pain .      Last office visit with prescribing clinician: Visit date not found      Next office visit with prescribing clinician: Visit date not found            Nghia Sharma MA/LMR  08/25/22, 06:58 EDT

## 2022-10-02 DIAGNOSIS — M15.9 PRIMARY OSTEOARTHRITIS INVOLVING MULTIPLE JOINTS: ICD-10-CM

## 2022-10-02 DIAGNOSIS — G89.4 CHRONIC PAIN SYNDROME: ICD-10-CM

## 2022-10-03 RX ORDER — HYDROCODONE BITARTRATE AND ACETAMINOPHEN 7.5; 325 MG/1; MG/1
1 TABLET ORAL EVERY 8 HOURS PRN
Qty: 90 TABLET | Refills: 0 | Status: SHIPPED | OUTPATIENT
Start: 2022-10-03 | End: 2022-10-27 | Stop reason: SDUPTHER

## 2022-10-03 NOTE — TELEPHONE ENCOUNTER
Rx Refill Note  Requested Prescriptions     Pending Prescriptions Disp Refills   • HYDROcodone-acetaminophen (NORCO) 7.5-325 MG per tablet 90 tablet 0     Sig: Take 1 tablet by mouth Every 8 (Eight) Hours As Needed for Moderate Pain.      Last office visit with prescribing clinician: 7/18/2022      Next office visit with prescribing clinician: 10/27/2022            Bartolo Franco CMA/JOCELINE  10/03/22, 16:22 EDT

## 2022-10-05 DIAGNOSIS — R92.8 ABNORMALITY OF RIGHT BREAST ON SCREENING MAMMOGRAM: Primary | ICD-10-CM

## 2022-10-17 RX ORDER — ALBUTEROL SULFATE 90 UG/1
AEROSOL, METERED RESPIRATORY (INHALATION)
Qty: 8.5 G | Refills: 6 | Status: SHIPPED | OUTPATIENT
Start: 2022-10-17

## 2022-10-27 ENCOUNTER — OFFICE VISIT (OUTPATIENT)
Dept: FAMILY MEDICINE CLINIC | Facility: CLINIC | Age: 67
End: 2022-10-27

## 2022-10-27 VITALS
SYSTOLIC BLOOD PRESSURE: 122 MMHG | OXYGEN SATURATION: 97 % | HEIGHT: 66 IN | DIASTOLIC BLOOD PRESSURE: 80 MMHG | TEMPERATURE: 98.3 F | HEART RATE: 88 BPM | WEIGHT: 175 LBS | BODY MASS INDEX: 28.12 KG/M2

## 2022-10-27 DIAGNOSIS — N39.0 URINARY TRACT INFECTION WITHOUT HEMATURIA, SITE UNSPECIFIED: ICD-10-CM

## 2022-10-27 DIAGNOSIS — G89.4 CHRONIC PAIN SYNDROME: ICD-10-CM

## 2022-10-27 DIAGNOSIS — Z00.00 MEDICARE ANNUAL WELLNESS VISIT, SUBSEQUENT: ICD-10-CM

## 2022-10-27 DIAGNOSIS — M15.9 PRIMARY OSTEOARTHRITIS INVOLVING MULTIPLE JOINTS: ICD-10-CM

## 2022-10-27 DIAGNOSIS — R30.0 DYSURIA: Primary | ICD-10-CM

## 2022-10-27 DIAGNOSIS — Z23 IMMUNIZATION DUE: ICD-10-CM

## 2022-10-27 LAB
BILIRUB BLD-MCNC: NEGATIVE MG/DL
CLARITY, POC: CLEAR
COLOR UR: YELLOW
EXPIRATION DATE: ABNORMAL
GLUCOSE UR STRIP-MCNC: NEGATIVE MG/DL
KETONES UR QL: NEGATIVE
LEUKOCYTE EST, POC: NEGATIVE
Lab: ABNORMAL
NITRITE UR-MCNC: NEGATIVE MG/ML
PH UR: 7.5 [PH] (ref 5–8)
PROT UR STRIP-MCNC: NEGATIVE MG/DL
RBC # UR STRIP: ABNORMAL /UL
SP GR UR: 1.01 (ref 1–1.03)
UROBILINOGEN UR QL: NORMAL

## 2022-10-27 PROCEDURE — 1170F FXNL STATUS ASSESSED: CPT | Performed by: INTERNAL MEDICINE

## 2022-10-27 PROCEDURE — 90677 PCV20 VACCINE IM: CPT | Performed by: INTERNAL MEDICINE

## 2022-10-27 PROCEDURE — 81003 URINALYSIS AUTO W/O SCOPE: CPT | Performed by: INTERNAL MEDICINE

## 2022-10-27 PROCEDURE — G0439 PPPS, SUBSEQ VISIT: HCPCS | Performed by: INTERNAL MEDICINE

## 2022-10-27 PROCEDURE — G0009 ADMIN PNEUMOCOCCAL VACCINE: HCPCS | Performed by: INTERNAL MEDICINE

## 2022-10-27 PROCEDURE — G0008 ADMIN INFLUENZA VIRUS VAC: HCPCS | Performed by: INTERNAL MEDICINE

## 2022-10-27 PROCEDURE — 90662 IIV NO PRSV INCREASED AG IM: CPT | Performed by: INTERNAL MEDICINE

## 2022-10-27 PROCEDURE — 1159F MED LIST DOCD IN RCRD: CPT | Performed by: INTERNAL MEDICINE

## 2022-10-27 RX ORDER — SULFAMETHOXAZOLE AND TRIMETHOPRIM 800; 160 MG/1; MG/1
1 TABLET ORAL 2 TIMES DAILY
Qty: 14 TABLET | Refills: 0 | Status: SHIPPED | OUTPATIENT
Start: 2022-10-27 | End: 2023-01-30 | Stop reason: SINTOL

## 2022-10-27 RX ORDER — HYDROCODONE BITARTRATE AND ACETAMINOPHEN 7.5; 325 MG/1; MG/1
1 TABLET ORAL EVERY 8 HOURS PRN
Qty: 90 TABLET | Refills: 0 | Status: SHIPPED | OUTPATIENT
Start: 2022-10-27 | End: 2022-11-07 | Stop reason: SDUPTHER

## 2022-10-27 NOTE — PATIENT INSTRUCTIONS
Medicare Wellness  Personal Prevention Plan of Service     Date of Office Visit:    Encounter Provider:  Nghia Eaton MD  Place of Service:  [facility]  Patient Name: Lula Decker  :  1955    As part of the Medicare Wellness portion of your visit today, we are providing you with this personalized preventive plan of services (PPPS). This plan is based upon recommendations of the [place] Preventive Services Task Force (USPSTF) and the Advisory Committee on Immunization Practices (ACIP).    This lists the preventive care services that should be considered, and provides dates of when you are due. Items listed as completed are up-to-date and do not require any further intervention.    Health Maintenance   Topic Date Due    DXA SCAN  Never done    TDAP/TD VACCINES (1 - Tdap) Never done    ZOSTER VACCINE (1 of 2) Never done    COVID-19 Vaccine (5 - Booster for Moderna series) 2022    LIPID PANEL  2023    ANNUAL WELLNESS VISIT  10/27/2023    COLORECTAL CANCER SCREENING  2024    MAMMOGRAM  10/04/2024    HEPATITIS C SCREENING  Completed    INFLUENZA VACCINE  Completed    Pneumococcal Vaccine 65+  Completed    LUNG CANCER SCREENING  Discontinued       Orders Placed This Encounter   Procedures    Urine Culture - Urine, Urine, Clean Catch    Fluzone High-Dose 65+yrs (4150-1248)    Pneumococcal Conjugate Vaccine 20-Valent All    POCT urinalysis dipstick, automated     Order Specific Question:   Release to patient     Answer:   Routine Release       Return in about 6 months (around 2023).

## 2022-10-27 NOTE — PROGRESS NOTES
Subsequent Medicare Wellness Visit   The ABC's of the Annual Wellness Visit    Chief Complaint   Patient presents with   • Annual Exam   • Blood in Urine       HPI:  Lula Decker, -1955, is a 67 y.o. female who presents for a Subsequent Medicare Wellness Visit.  Complains of urinary frequency and burning for the last week then 2 days ago with blood in the urine.  Denies new abdomen/back pain, nausea or vomiting.    History of hypertension.  Currently, has been feeling well and asymptomatic without any headaches,vision changes, cough, chest pain, shortness of breath, swelling, focal neurologic deficit, memory loss or syncope.  Has been taking the medications regularly and adherent with the regimen of metoprolol tartrate 100 mg twice a day.  Denies medication side effects and no significant interval events.      Follow-up for cholesterol.  Currently, has been feeling well without any myalgias, muscle aches, weakness, numbness, chest pain, short of breath or other issues.  Currently, is adherent with medication regimen of crestor 10 mg and denies medication side effects. Is due for lab follow-up since was changed to crestor in 2022 due to insurance.     Continued knee pain that intermittently flares and is severe in the right.  Has seen ortho and only options are injections or surgery but patient continues to refuse these options at this time.     Follow-up with GERD and intermittent nausea lasting 5- 10 minutes then self resolves.  This has been a chronic issue for a long time and unchanged.  She continues with the pantoprazole 40 mg daily.     Patient with COPD and on oxygen nasal canula and using the inhaled medications is stable and continues to see pulmonology.  Now on duo-neb, pulmicort and brovana.     Continues to have chronic pain in the back, shoulders and knee.  Using the hydrocodone as needed and averages 2-3 times per day.  Denies side effects or issues with the problems or  medications.    Recent Hospitalizations:  No hospitalization(s) within the last year..    Current Medical Providers:  Patient Care Team:  Nghia Eaton MD as PCP - General (Internal Medicine)  Jeb Smart MD as Consulting Physician (Orthopedic Surgery)  Stefani Prince MD (Pulmonary Disease)  Hank Sawant MD as Consulting Physician (Gastroenterology)    Health Habits and Functional and Cognitive Screening and Depression Screening:  Functional & Cognitive Status 10/27/2022   Do you have difficulty preparing food and eating? No   Do you have difficulty bathing yourself, getting dressed or grooming yourself? No   Do you have difficulty using the toilet? No   Do you have difficulty moving around from place to place? Yes   Do you have trouble with steps or getting out of a bed or a chair? Yes   Current Diet Well Balanced Diet   Dental Exam Up to date   Eye Exam Up to date   Exercise (times per week) 0 times per week   Current Exercise Activities Include -   Do you need help using the phone?  No   Are you deaf or do you have serious difficulty hearing?  Yes   Do you need help with transportation? No   Do you need help shopping? No   Do you need help preparing meals?  No   Do you need help with housework?  No   Do you need help with laundry? No   Do you need help taking your medications? No   Do you need help managing money? No   Do you ever drive or ride in a car without wearing a seat belt? No   Have you felt unusual stress, anger or loneliness in the last month? No   Who do you live with? Alone   If you need help, do you have trouble finding someone available to you? No   Have you been bothered in the last four weeks by sexual problems? No   Do you have difficulty concentrating, remembering or making decisions? No   Patient with COPD and chronic short of breath and back pain.  Chronic hearing issues unchanged.    Compared to one year ago, the patient feels her physical health is the same and her  mental health is the same.    Depression Screen:  PHQ-2/PHQ-9 Depression Screening 10/27/2022   Retired PHQ-9 Total Score -   Retired Total Score -   Little Interest or Pleasure in Doing Things 0-->not at all   Feeling Down, Depressed or Hopeless 0-->not at all   PHQ-9: Brief Depression Severity Measure Score 0     Falls Risk Assessment:  KYLER Fall Risk Clinician Key Questions   Have you fallen in the past year?: No  Do you feel unsteady with walking?: Yes    Past Medical/Family/Social History:  The following portions of the patient's history were reviewed and updated as appropriate: allergies, current medications, past family history, past medical history, past social history, past surgical history and problem list.    Allergies   Allergen Reactions   • Nystatin GI Intolerance     Nausea with oral solution       Current Outpatient Medications:   •  HYDROcodone-acetaminophen (NORCO) 7.5-325 MG per tablet, Take 1 tablet by mouth Every 8 (Eight) Hours As Needed for Moderate Pain., Disp: 90 tablet, Rfl: 0  •  acyclovir (ZOVIRAX) 800 MG tablet, Take 1 tablet by mouth 5 (Five) Times a Day., Disp: 35 tablet, Rfl: 0  •  albuterol sulfate  (90 Base) MCG/ACT inhaler, INHALE 2 PUFFS INTO THE LUNGS EVERY 4 HOURS AS NEEDED, Disp: 8.5 g, Rfl: 6  •  amitriptyline (ELAVIL) 100 MG tablet, TAKE ONE TABLET BY MOUTH EVERY NIGHT AT BEDTIME, Disp: 90 tablet, Rfl: 1  •  arformoterol (BROVANA) 15 MCG/2ML nebulizer solution, Take  by nebulization 2 (Two) Times a Day., Disp: , Rfl:   •  budesonide (PULMICORT) 0.5 MG/2ML nebulizer solution, Take 0.5 mg by nebulization Daily., Disp: , Rfl:   •  doxylamine (UNISOM) 25 MG tablet, Take 1 tablet by mouth At Night As Needed., Disp: , Rfl:   •  hydrocortisone 2.5 % cream, Apply 2.5 application topically to the appropriate area as directed 3 (Three) Times a Day., Disp: , Rfl:   •  ipratropium-albuterol (DUO-NEB) 0.5-2.5 mg/3 ml nebulizer, Take 3 mL by nebulization 4 (Four) Times a Day.,  Disp: 360 mL, Rfl: 3  •  metoprolol tartrate (LOPRESSOR) 100 MG tablet, Take 1 tablet by mouth 2 (Two) Times a Day., Disp: 180 tablet, Rfl: 0  •  pantoprazole (PROTONIX) 40 MG EC tablet, TAKE ONE TABLET BY MOUTH DAILY, Disp: 90 tablet, Rfl: 1  •  rosuvastatin (CRESTOR) 10 MG tablet, TAKE 1 TABLET BY MOUTH EVERY DAY, Disp: 90 tablet, Rfl: 3  •  sulfamethoxazole-trimethoprim (Bactrim DS) 800-160 MG per tablet, Take 1 tablet by mouth 2 (Two) Times a Day., Disp: 14 tablet, Rfl: 0  •  triamcinolone (KENALOG) 0.1 % cream, Apply  topically to the appropriate area as directed 2 (Two) Times a Day., Disp: 45 g, Rfl: 0  •  vitamin B-12 (CYANOCOBALAMIN) 1000 MCG tablet, Take 1,000 mcg by mouth Daily., Disp: , Rfl:     Aspirin use counseling: Does not need ASA (and currently is not on it)    Current medication list contains no high risk medications.  No harmful drug interactions have been identified.     Family History   Problem Relation Age of Onset   • Heart failure Father    • Heart disease Father        Social History     Tobacco Use   • Smoking status: Every Day     Packs/day: 0.50     Years: 50.00     Pack years: 25.00     Types: Cigarettes   • Smokeless tobacco: Never   Substance Use Topics   • Alcohol use: Yes       Past Surgical History:   Procedure Laterality Date   • BREAST LUMPECTOMY     • BREAST SURGERY     • COLONOSCOPY     • EYE SURGERY  catract surgery   • HYSTERECTOMY      Not due to cancer   • NECK SURGERY     • NEUROPLASTY      Median nerve at carpal tunnel.   • OOPHORECTOMY     • OTHER SURGICAL HISTORY  08/20/2014    Esophadogastroduodenoscopy - Gastritis    • TONSILLECTOMY     • TUBAL ABDOMINAL LIGATION         Patient Active Problem List   Diagnosis   • Chronic pain syndrome   • Hypertension   • GERD (gastroesophageal reflux disease)   • UTI (urinary tract infection)   • Right knee pain   • Visual disturbances   • Postmenopausal symptoms   • Worsening headaches   • Hyperlipidemia   • Urinary symptom or sign  "  • OA (osteoarthritis)   • Edema of left ankle   • Edema of left lower extremity due to peripheral venous insufficiency   • Varicose veins of both lower extremities   • Asthma   • COPD (chronic obstructive pulmonary disease) (HCC)   • Insomnia   • Rash of foot   • Herpes zoster without complication   • Discoloration of skin of foot   • Skin lesion of right arm   • Facial rash   • Contact dermatitis   • Mammogram declined   • Medicare annual wellness visit, subsequent       Review of Systems   Constitutional: Negative for activity change, appetite change, fatigue, fever, unexpected weight gain and unexpected weight loss.   HENT: Negative for nosebleeds, rhinorrhea, trouble swallowing and voice change.    Eyes: Negative for visual disturbance.   Respiratory: Positive for cough and shortness of breath. Negative for chest tightness and wheezing.    Cardiovascular: Negative for chest pain, palpitations and leg swelling.   Gastrointestinal: Negative for abdominal pain, blood in stool, constipation, diarrhea, nausea, vomiting, GERD and indigestion.   Genitourinary: Negative for dysuria, frequency and hematuria.   Musculoskeletal: Positive for arthralgias and back pain. Negative for myalgias.   Skin: Negative for rash and wound.   Neurological: Negative for dizziness, tremors, weakness, light-headedness, numbness, headache and memory problem.   Hematological: Negative for adenopathy. Does not bruise/bleed easily.   Psychiatric/Behavioral: Negative for sleep disturbance and depressed mood. The patient is not nervous/anxious.        Objective     Vitals:    10/27/22 1304   BP: 122/80   BP Location: Left arm   Patient Position: Sitting   Cuff Size: Adult   Pulse: 88   Temp: 98.3 °F (36.8 °C)   TempSrc: Temporal   SpO2: 97%   Weight: 79.4 kg (175 lb)   Height: 167.6 cm (66\")     BMI is >= 25 and <30. (Overweight) The following options were offered after discussion;: weight loss educational material (shared in after visit " summary), exercise counseling/recommendations and nutrition counseling/recommendations    The patient has no evidence of cognitve impairment.     Physical Exam  Vitals and nursing note reviewed.   Constitutional:       General: She is not in acute distress.     Appearance: She is well-developed. She is not diaphoretic.   HENT:      Head: Normocephalic and atraumatic.      Right Ear: External ear normal.      Left Ear: External ear normal.      Nose: Nose normal.   Eyes:      Conjunctiva/sclera: Conjunctivae normal.      Pupils: Pupils are equal, round, and reactive to light.   Neck:      Thyroid: No thyromegaly.      Trachea: No tracheal deviation.   Cardiovascular:      Rate and Rhythm: Normal rate and regular rhythm.      Heart sounds: Normal heart sounds. No murmur heard.    No friction rub. No gallop.   Pulmonary:      Comments: Patient lysing oxygen 2 L per nasal cannula.  Shortness of breath with exertion and has poor aeration bilaterally with expiratory wheezes bilaterally.  Abdominal:      General: Bowel sounds are normal.      Palpations: Abdomen is soft. There is no mass.      Tenderness: There is no abdominal tenderness. There is no guarding.   Musculoskeletal:         General: Normal range of motion.      Cervical back: Normal range of motion and neck supple.   Lymphadenopathy:      Cervical: No cervical adenopathy.   Skin:     General: Skin is warm and dry.      Capillary Refill: Capillary refill takes less than 2 seconds.      Findings: No rash.   Neurological:      Mental Status: She is alert and oriented to person, place, and time.      Motor: No abnormal muscle tone.      Deep Tendon Reflexes: Reflexes normal.   Psychiatric:         Behavior: Behavior normal.         Thought Content: Thought content normal.         Judgment: Judgment normal.         Recent Lab Results:  Lab Results   Component Value Date     (H) 12/12/2018     Lab Results   Component Value Date    TRIG 222 (H) 03/31/2022     HDL 57 03/31/2022    VLDL 38 03/31/2022       Assessment & Plan   Age-appropriate Screening Schedule:  Refer to the list below for future screening recommendations based on patient's age, sex and/or medical conditions.      Health Maintenance   Topic Date Due   • DXA SCAN  Never done   • TDAP/TD VACCINES (1 - Tdap) Never done   • ZOSTER VACCINE (1 of 2) Never done   • LIPID PANEL  03/31/2023   • MAMMOGRAM  10/04/2024   • INFLUENZA VACCINE  Completed       Medicare Risks and Personalized Health Plan:  Advance Directive Discussion  Fall Risk  Glaucoma Risk  Immunizations Discussed/Encouraged (specific immunizations; Tdap, Influenza, Pneumococcal 23, Shingrix and COVID19 )  Osteoporosis Risk    CMS-Preventive Services Quick Reference  Medicare Preventive Services Addressed:  Annual Wellness Visit (AWV)  Glaucoma screening (for individuals with diabetes mellitus, family history of glaucoma, -Americans (> or =) age 50, -Americans (> or =) age 65)    Advance Care Planning:  ACP discussion was held with the patient during this visit. Patient has an advance directive (not in EMR), copy requested.    Diagnoses and all orders for this visit:    1. Dysuria (Primary)  -     POCT urinalysis dipstick, automated  -     Urine Culture - Urine, Urine, Clean Catch    2. Chronic pain syndrome  -     HYDROcodone-acetaminophen (NORCO) 7.5-325 MG per tablet; Take 1 tablet by mouth Every 8 (Eight) Hours As Needed for Moderate Pain.  Dispense: 90 tablet; Refill: 0    3. Primary osteoarthritis involving multiple joints  -     HYDROcodone-acetaminophen (NORCO) 7.5-325 MG per tablet; Take 1 tablet by mouth Every 8 (Eight) Hours As Needed for Moderate Pain.  Dispense: 90 tablet; Refill: 0    4. Medicare annual wellness visit, subsequent    5. Urinary tract infection without hematuria, site unspecified  -     Urine Culture - Urine, Urine, Clean Catch    6. Immunization due  -     Fluzone High-Dose 65+yrs (2757-7711)  -      Pneumococcal Conjugate Vaccine 20-Valent All    Other orders  -     sulfamethoxazole-trimethoprim (Bactrim DS) 800-160 MG per tablet; Take 1 tablet by mouth 2 (Two) Times a Day.  Dispense: 14 tablet; Refill: 0    Reviewed history and annual wellness visit with patient during office time.  Medications reviewed as appropriate.  Discussed advanced directives and living will.  Patient has living will: Living will: yes and patient will bring copy to office.  Discussed fall risk and precautions encourage removing throw rugs and using grab bars within the home and bathroom.  Will check the labs as ordered above to evaluate the blood sugars, kidney, liver, cholesterol for screening.  Discussed flu shot recommended to get the high-dose influenza vaccine annually in the fall.  The patient has started, but not completed, their COVID-19 vaccination series. Pneumovax-23 up to date and appropriate.  Tdap, COVID booster, Prevnar 20, and Shingrix vaccination series recommended.  Encourage follow-up with the eye doctor on annual basis for glaucoma evaluation.  Discussed weight and encouraged exercise as tolerated while following a healthy diet.  Colon cancer screening discussed and current status: colonoscopy last completed 8/20/2014 and repeat after 10 years recommended.  Recommend to get annual mammograms that has been ordered.  Follow-up with specialists as scheduled.      Urinary issues consistent with UTI.  Will treat and await culture.  Chronic shortness of breath with hypoxemia on nasal cannula oxygen.  Utilizing the nebulizer which she needs a new 1 to be at home.  Order be sent to [facility].  Chronic pain tolerating the medication.  ROGER run and reviewed.  Risks of the medication include but are not limited to fatigue, somnolence, increased risk of falls, constipation, allergic reaction, dependence, and addiction.  An After Visit Summary and PPPS with all of these plans were given to the patient.      Follow Up:  Return in  about 6 months (around 4/27/2023).           · COVID-19 Precautions - Patient was compliant in wearing a mask. When I saw the patient, I used appropriate personal protective equipment (PPE) including mask and eye shield (standard procedure).  Additionally, I used gown and gloves if indicated.  Hand hygiene was completed before and after seeing the patient.  · Dictated utilizing Dragon Dictation

## 2022-10-29 LAB
BACTERIA UR CULT: NORMAL
BACTERIA UR CULT: NORMAL

## 2022-11-07 ENCOUNTER — TELEPHONE (OUTPATIENT)
Dept: FAMILY MEDICINE CLINIC | Facility: CLINIC | Age: 67
End: 2022-11-07

## 2022-11-07 DIAGNOSIS — M15.9 PRIMARY OSTEOARTHRITIS INVOLVING MULTIPLE JOINTS: ICD-10-CM

## 2022-11-07 DIAGNOSIS — G89.4 CHRONIC PAIN SYNDROME: ICD-10-CM

## 2022-11-07 RX ORDER — HYDROCODONE BITARTRATE AND ACETAMINOPHEN 7.5; 325 MG/1; MG/1
1 TABLET ORAL EVERY 8 HOURS PRN
Qty: 90 TABLET | Refills: 0 | Status: SHIPPED | OUTPATIENT
Start: 2022-11-07 | End: 2022-12-04 | Stop reason: SDUPTHER

## 2022-11-07 RX ORDER — HYDROCODONE BITARTRATE AND ACETAMINOPHEN 7.5; 325 MG/1; MG/1
1 TABLET ORAL EVERY 8 HOURS PRN
Qty: 90 TABLET | Refills: 0 | Status: CANCELLED | OUTPATIENT
Start: 2022-11-07

## 2022-11-07 NOTE — TELEPHONE ENCOUNTER
Will call/request the records for CT, labs etc. From [facility] ([facility]).    Also her hydrocodone from 10/27/22 went [facility] - she needs it sent to [place] [id number]

## 2022-11-07 NOTE — TELEPHONE ENCOUNTER
Caller: Lula Decker    Relationship: Self    Best call back number: [phone]    Requested Prescriptions:   Requested Prescriptions     Pending Prescriptions Disp Refills   • HYDROcodone-acetaminophen (NORCO) 7.5-325 MG per tablet 90 tablet 0     Sig: Take 1 tablet by mouth Every 8 (Eight) Hours As Needed for Moderate Pain.        Pharmacy where request should be sent: [facility] [phone] [id number] [address] [facility] [address] [facility] - [phone] [facility] [phone] FX     Additional details provided by patient: PATIENT IS OUT OF THIS MEDICATION. PRESCRIPTION WAS SENT TO THE WRONG PHARMACY.    Does the patient have less than a 3 day supply:  [x] Yes  [] No    Chris Khan Rep   11/07/22 12:43 EST

## 2022-11-07 NOTE — TELEPHONE ENCOUNTER
Caller: Lula Decker    Relationship: Self    Best call back number: [phone]    What is the best time to reach you: ANYTIME     Who are you requesting to speak with (clinical staff, provider,  specific staff member): REE YOON    What was the call regarding: PATIENT RECEIVED A FLU AND PNEUMONIA SHOT ON 10.27.22 AND STATES SHE HAD A REACTION THAT ENDED HER IN THE ER. PATIENT STATES SHE HAD TEST DONE AND IS WONDERING WHAT THOSE RESULTS WERE AND WHAT THE CAUSE OF THE REACTION WAS.     Do you require a callback: YES

## 2022-11-18 DIAGNOSIS — R92.8 ABNORMAL MAMMOGRAM: Primary | ICD-10-CM

## 2022-11-26 DIAGNOSIS — I10 ESSENTIAL HYPERTENSION: ICD-10-CM

## 2022-11-28 RX ORDER — METOPROLOL TARTRATE 100 MG/1
TABLET ORAL
Qty: 180 TABLET | Refills: 0 | Status: SHIPPED | OUTPATIENT
Start: 2022-11-28 | End: 2023-02-21

## 2022-11-28 NOTE — TELEPHONE ENCOUNTER
Rx Refill Note  Requested Prescriptions     Pending Prescriptions Disp Refills   • metoprolol tartrate (LOPRESSOR) 100 MG tablet [Pharmacy Med Name: METOPROLOL TARTRATE 100MG TABLETS] 180 tablet 0     Sig: TAKE 1 TABLET BY MOUTH TWICE DAILY      Last office visit with prescribing clinician: 10/27/2022      Next office visit with prescribing clinician: Visit date not found            Alma Delia Vera MA  11/28/22, 09:29 EST   24

## 2022-12-04 DIAGNOSIS — M15.9 PRIMARY OSTEOARTHRITIS INVOLVING MULTIPLE JOINTS: ICD-10-CM

## 2022-12-04 DIAGNOSIS — G89.4 CHRONIC PAIN SYNDROME: ICD-10-CM

## 2022-12-06 DIAGNOSIS — R92.8 ABNORMAL MAMMOGRAM: ICD-10-CM

## 2022-12-06 RX ORDER — HYDROCODONE BITARTRATE AND ACETAMINOPHEN 7.5; 325 MG/1; MG/1
1 TABLET ORAL EVERY 8 HOURS PRN
Qty: 90 TABLET | Refills: 0 | Status: SHIPPED | OUTPATIENT
Start: 2022-12-06 | End: 2022-12-28 | Stop reason: SDUPTHER

## 2022-12-28 DIAGNOSIS — M15.9 PRIMARY OSTEOARTHRITIS INVOLVING MULTIPLE JOINTS: ICD-10-CM

## 2022-12-28 DIAGNOSIS — G89.4 CHRONIC PAIN SYNDROME: ICD-10-CM

## 2022-12-28 NOTE — TELEPHONE ENCOUNTER
Rx Refill Note  Requested Prescriptions     Pending Prescriptions Disp Refills   • HYDROcodone-acetaminophen (NORCO) 7.5-325 MG per tablet 90 tablet 0     Sig: Take 1 tablet by mouth Every 8 (Eight) Hours As Needed for Moderate Pain.      Last office visit with prescribing clinician: 10/27/2022   Last telemedicine visit with prescribing clinician: Visit date not found   Next office visit with prescribing clinician: Visit date not found

## 2023-01-03 RX ORDER — HYDROCODONE BITARTRATE AND ACETAMINOPHEN 7.5; 325 MG/1; MG/1
1 TABLET ORAL EVERY 8 HOURS PRN
Qty: 90 TABLET | Refills: 0 | Status: SHIPPED | OUTPATIENT
Start: 2023-01-03 | End: 2023-01-30 | Stop reason: SDUPTHER

## 2023-01-30 ENCOUNTER — OFFICE VISIT (OUTPATIENT)
Dept: FAMILY MEDICINE CLINIC | Facility: CLINIC | Age: 68
End: 2023-01-30
Payer: MEDICARE

## 2023-01-30 VITALS
HEIGHT: 66 IN | SYSTOLIC BLOOD PRESSURE: 134 MMHG | BODY MASS INDEX: 28.38 KG/M2 | HEART RATE: 85 BPM | DIASTOLIC BLOOD PRESSURE: 84 MMHG | WEIGHT: 176.6 LBS | OXYGEN SATURATION: 99 %

## 2023-01-30 DIAGNOSIS — M15.9 PRIMARY OSTEOARTHRITIS INVOLVING MULTIPLE JOINTS: ICD-10-CM

## 2023-01-30 DIAGNOSIS — J44.1 CHRONIC OBSTRUCTIVE PULMONARY DISEASE WITH ACUTE EXACERBATION: ICD-10-CM

## 2023-01-30 DIAGNOSIS — G89.4 CHRONIC PAIN SYNDROME: ICD-10-CM

## 2023-01-30 DIAGNOSIS — N39.0 URINARY TRACT INFECTION WITHOUT HEMATURIA, SITE UNSPECIFIED: ICD-10-CM

## 2023-01-30 DIAGNOSIS — R39.198 DIFFICULTY URINATING: Primary | ICD-10-CM

## 2023-01-30 LAB
BILIRUB BLD-MCNC: NEGATIVE MG/DL
CLARITY, POC: CLEAR
COLOR UR: YELLOW
EXPIRATION DATE: ABNORMAL
GLUCOSE UR STRIP-MCNC: NEGATIVE MG/DL
KETONES UR QL: NEGATIVE
LEUKOCYTE EST, POC: ABNORMAL
Lab: ABNORMAL
NITRITE UR-MCNC: NEGATIVE MG/ML
PH UR: 7 [PH] (ref 5–8)
PROT UR STRIP-MCNC: NEGATIVE MG/DL
RBC # UR STRIP: ABNORMAL /UL
SP GR UR: 1.01 (ref 1–1.03)
UROBILINOGEN UR QL: NORMAL

## 2023-01-30 PROCEDURE — 81003 URINALYSIS AUTO W/O SCOPE: CPT | Performed by: INTERNAL MEDICINE

## 2023-01-30 PROCEDURE — 99214 OFFICE O/P EST MOD 30 MIN: CPT | Performed by: INTERNAL MEDICINE

## 2023-01-30 RX ORDER — HYDROCODONE BITARTRATE AND ACETAMINOPHEN 7.5; 325 MG/1; MG/1
1 TABLET ORAL EVERY 8 HOURS PRN
Qty: 90 TABLET | Refills: 0 | Status: SHIPPED | OUTPATIENT
Start: 2023-01-30 | End: 2023-03-01 | Stop reason: SDUPTHER

## 2023-01-30 RX ORDER — PREDNISONE 20 MG/1
TABLET ORAL
Qty: 7 TABLET | Refills: 0 | Status: SHIPPED | OUTPATIENT
Start: 2023-01-30 | End: 2023-02-08

## 2023-01-30 RX ORDER — CIPROFLOXACIN 250 MG/1
250 TABLET, FILM COATED ORAL 2 TIMES DAILY
Qty: 14 TABLET | Refills: 0 | Status: SHIPPED | OUTPATIENT
Start: 2023-01-30

## 2023-01-30 NOTE — PROGRESS NOTES
Subjective   Lula Decker is a 67 y.o. female.     Chief Complaint   Patient presents with   • Difficulty Urinating     Follow up from 3 months ago, she thinks she has a UTI today   • Hypertension   • Hyperlipidemia       History of Present Illness   Patient with UTI symptoms.  Urinary frequency, cloudy urine and burning on urination for the last 2 weeks.    History of hypertension.  Currently, has been feeling well and asymptomatic without any headaches,vision changes, cough, chest pain, shortness of breath, swelling, focal neurologic deficit, memory loss or syncope.  Has been taking the medications regularly and adherent with the regimen of metoprolol tartrate 100 mg twice a day.  Denies medication side effects and no significant interval events.      Follow-up for cholesterol.  Currently, has been feeling well without any myalgias, muscle aches, weakness, numbness, chest pain, short of breath or other issues.  Currently, is adherent with medication regimen of crestor 10 mg and denies medication side effects. Is due for lab follow-up since was changed to crestor in Jan 2022 due to insurance.     Continued knee pain that intermittently flares and is severe in the right.  Has seen ortho and only options are injections or surgery but patient continues to refuse these options at this time.     Follow-up with GERD and intermittent nausea lasting 5- 10 minutes then self resolves.  This has been a chronic issue for a long time and unchanged.  She continues with the pantoprazole 40 mg daily.     Patient with COPD and on oxygen nasal canula and using the inhaled medications is stable and continues to see pulmonology.  Now on duo-neb, pulmicort and brovana.  Having progressive SOA and wheezing.     Continues to have chronic pain in the back, shoulders and knee.  Using the hydrocodone as needed and averages 2-3 times per day.  Denies side effects or issues with the problems or medications.     The following portions of the  patient's history were reviewed and updated as appropriate: allergies, current medications, past family history, past medical history, past social history, past surgical history and problem list.    Depression Screen:  PHQ-2/PHQ-9 Depression Screening 10/27/2022   Retired PHQ-9 Total Score -   Retired Total Score -   Little Interest or Pleasure in Doing Things 0-->not at all   Feeling Down, Depressed or Hopeless 0-->not at all   PHQ-9: Brief Depression Severity Measure Score 0       Past Medical History:   Diagnosis Date   • Abnormal findings on diagnostic imaging of abdomen    • Alopecia areata    • Asthma    • Bleeding external hemorrhoids    • BMI 25.0-25.9,adult    • Calf cramp    • Carpal tunnel syndrome    • Chest pain    • Chest wall pain    • Constipation    • COPD (chronic obstructive pulmonary disease) ([facility])    • COPD with acute exacerbation ([facility])    • Discoloration of skin of foot    • Dyslipidemia    • Dysuria    • Eczema    • Encounter for immunization    • Esophageal reflux    • Fatty liver    • Fibromyalgia, primary    • GERD (gastroesophageal reflux disease)    • Headache    • Hives of unknown origin    • Hyperactivity of bladder    • Hypertension    • IBS (irritable bowel syndrome)    • Incontinence of urine    • Insomnia    • Menopausal symptom    • Migraine    • MRSA exposure    • Muscle spasm    • Nocturia    • OA (osteoarthritis)    • Obstructive chronic bronchitis with acute exacerbation ([facility])    • Oral thrush    • Osteoarthritis of left knee    • Osteoporosis    • PAD (peripheral artery disease) ([facility])    • Sinusitis, acute    • Synovial cyst of popliteal space    • Tremor    • Tubular adenoma of colon    • Urinary frequency    • Urinary urgency    • Urinary, incontinence, stress female    • UTI (urinary tract infection)        Past Surgical History:   Procedure Laterality Date   • BREAST LUMPECTOMY     • BREAST SURGERY     • COLONOSCOPY     • EYE SURGERY  catract surgery   • HYSTERECTOMY       Not due to cancer   • NECK SURGERY     • NEUROPLASTY      Median nerve at carpal tunnel.   • OOPHORECTOMY     • OTHER SURGICAL HISTORY  08/20/2014    Esophadogastroduodenoscopy - Gastritis    • TONSILLECTOMY     • TUBAL ABDOMINAL LIGATION         Family History   Problem Relation Age of Onset   • Heart failure Father    • Heart disease Father        Social History     Socioeconomic History   • Marital status:    Tobacco Use   • Smoking status: Every Day     Packs/day: 0.50     Years: 50.00     Pack years: 25.00     Types: Cigarettes   • Smokeless tobacco: Never   Substance and Sexual Activity   • Alcohol use: Yes   • Drug use: No   • Sexual activity: Defer       Current Outpatient Medications   Medication Sig Dispense Refill   • acyclovir (ZOVIRAX) 800 MG tablet Take 1 tablet by mouth 5 (Five) Times a Day. 35 tablet 0   • albuterol sulfate  (90 Base) MCG/ACT inhaler INHALE 2 PUFFS INTO THE LUNGS EVERY 4 HOURS AS NEEDED 8.5 g 6   • amitriptyline (ELAVIL) 100 MG tablet TAKE ONE TABLET BY MOUTH EVERY NIGHT AT BEDTIME 90 tablet 1   • arformoterol (BROVANA) 15 MCG/2ML nebulizer solution Take  by nebulization 2 (Two) Times a Day.     • budesonide (PULMICORT) 0.5 MG/2ML nebulizer solution Take 0.5 mg by nebulization Daily.     • doxylamine (UNISOM) 25 MG tablet Take 1 tablet by mouth At Night As Needed.     • HYDROcodone-acetaminophen (NORCO) 7.5-325 MG per tablet Take 1 tablet by mouth Every 8 (Eight) Hours As Needed for Moderate Pain. 90 tablet 0   • hydrocortisone 2.5 % cream Apply 2.5 application topically to the appropriate area as directed 3 (Three) Times a Day.     • ipratropium-albuterol (DUO-NEB) 0.5-2.5 mg/3 ml nebulizer Take 3 mL by nebulization 4 (Four) Times a Day. 360 mL 3   • metoprolol tartrate (LOPRESSOR) 100 MG tablet TAKE 1 TABLET BY MOUTH TWICE DAILY 180 tablet 0   • pantoprazole (PROTONIX) 40 MG EC tablet TAKE ONE TABLET BY MOUTH DAILY 90 tablet 1   • rosuvastatin (CRESTOR) 10 MG tablet  "TAKE 1 TABLET BY MOUTH EVERY DAY 90 tablet 3   • triamcinolone (KENALOG) 0.1 % cream Apply  topically to the appropriate area as directed 2 (Two) Times a Day. 45 g 0   • vitamin B-12 (CYANOCOBALAMIN) 1000 MCG tablet Take 1,000 mcg by mouth Daily.     • ciprofloxacin (Cipro) 250 MG tablet Take 1 tablet by mouth 2 (Two) Times a Day. 14 tablet 0   • predniSONE (DELTASONE) 20 MG tablet Take 2 tablets by mouth Daily for 2 days, THEN 1 tablet Daily for 3 days, THEN 0.5 tablets Daily for 4 days. 7 tablet 0     No current facility-administered medications for this visit.       Review of Systems   Constitutional: Negative for activity change, appetite change, fatigue, fever, unexpected weight gain and unexpected weight loss.   HENT: Negative for nosebleeds, rhinorrhea, trouble swallowing and voice change.    Eyes: Negative for visual disturbance.   Respiratory: Negative for cough, chest tightness, shortness of breath and wheezing.    Cardiovascular: Negative for chest pain, palpitations and leg swelling.   Gastrointestinal: Negative for abdominal pain, blood in stool, constipation, diarrhea, nausea, vomiting, GERD and indigestion.   Genitourinary: Positive for difficulty urinating, dysuria and frequency. Negative for hematuria.   Musculoskeletal: Negative for arthralgias, back pain and myalgias.   Skin: Negative for rash and wound.   Neurological: Negative for dizziness, tremors, weakness, light-headedness, numbness, headache and memory problem.   Hematological: Negative for adenopathy. Does not bruise/bleed easily.   Psychiatric/Behavioral: Negative for sleep disturbance and depressed mood. The patient is not nervous/anxious.        Objective   /84 (BP Location: Left arm, Patient Position: Sitting, Cuff Size: Adult)   Pulse 85   Ht 167.6 cm (66\")   Wt 80.1 kg (176 lb 9.6 oz)   SpO2 99%   BMI 28.50 kg/m²     Physical Exam  Vitals and nursing note reviewed.   Constitutional:       General: She is not in acute " distress.     Appearance: She is well-developed. She is not diaphoretic.   HENT:      Head: Normocephalic and atraumatic.      Right Ear: External ear normal.      Left Ear: External ear normal.      Nose: Nose normal.   Eyes:      Conjunctiva/sclera: Conjunctivae normal.      Pupils: Pupils are equal, round, and reactive to light.   Neck:      Thyroid: No thyromegaly.      Trachea: No tracheal deviation.   Cardiovascular:      Rate and Rhythm: Normal rate and regular rhythm.      Heart sounds: Normal heart sounds. No murmur heard.    No friction rub. No gallop.   Pulmonary:      Effort: Pulmonary effort is normal. No respiratory distress.      Breath sounds: Normal breath sounds.   Abdominal:      General: Bowel sounds are normal.      Palpations: Abdomen is soft. There is no mass.      Tenderness: There is no abdominal tenderness. There is no guarding.   Musculoskeletal:         General: Normal range of motion.      Cervical back: Normal range of motion and neck supple.   Lymphadenopathy:      Cervical: No cervical adenopathy.   Skin:     General: Skin is warm and dry.      Capillary Refill: Capillary refill takes less than 2 seconds.      Findings: No rash.   Neurological:      Mental Status: She is alert and oriented to person, place, and time.      Motor: No abnormal muscle tone.      Deep Tendon Reflexes: Reflexes normal.   Psychiatric:         Behavior: Behavior normal.         Thought Content: Thought content normal.         Judgment: Judgment normal.       Recent Results (from the past 2016 hour(s))   POCT urinalysis dipstick, automated    Collection Time: 01/30/23  1:23 PM    Specimen: Urine   Result Value Ref Range    Color Yellow Yellow, Straw, Dark Yellow, Sepideh    Clarity, UA Clear Clear    Specific Gravity  1.015 1.005 - 1.030    pH, Urine 7.0 5.0 - 8.0    Leukocytes Small (1+) (A) Negative    Nitrite, UA Negative Negative    Protein, POC Negative Negative mg/dL    Glucose, UA Negative Negative mg/dL     Ketones, UA Negative Negative    Urobilinogen, UA Normal Normal, 0.2 E.U./dL    Bilirubin Negative Negative    Blood, UA 1+ (A) Negative    Lot Number [id number]     Expiration Date 11/24/23      Assessment & Plan   Diagnoses and all orders for this visit:    1. Difficulty urinating (Primary)  -     POCT urinalysis dipstick, automated  -     Cancel: Urine Culture - Urine, Urine, Clean Catch  -     ciprofloxacin (Cipro) 250 MG tablet; Take 1 tablet by mouth 2 (Two) Times a Day.  Dispense: 14 tablet; Refill: 0    2. Urinary tract infection without hematuria, site unspecified  -     ciprofloxacin (Cipro) 250 MG tablet; Take 1 tablet by mouth 2 (Two) Times a Day.  Dispense: 14 tablet; Refill: 0  -     Urine Culture - Urine, Urine, Clean Catch    3. Chronic obstructive pulmonary disease with acute exacerbation (HCC)  -     predniSONE (DELTASONE) 20 MG tablet; Take 2 tablets by mouth Daily for 2 days, THEN 1 tablet Daily for 3 days, THEN 0.5 tablets Daily for 4 days.  Dispense: 7 tablet; Refill: 0    4. Chronic pain syndrome  -     HYDROcodone-acetaminophen (NORCO) 7.5-325 MG per tablet; Take 1 tablet by mouth Every 8 (Eight) Hours As Needed for Moderate Pain.  Dispense: 90 tablet; Refill: 0    5. Primary osteoarthritis involving multiple joints  -     HYDROcodone-acetaminophen (NORCO) 7.5-325 MG per tablet; Take 1 tablet by mouth Every 8 (Eight) Hours As Needed for Moderate Pain.  Dispense: 90 tablet; Refill: 0    Patient with evidence of UTI will obtain urine culture but start ciprofloxacin 250 mg twice a day.  I discussed with patient risk of the medication which she is understanding.  Noted to have COPD which apparently acute exacerbation again.  Encourage her continues her inhaled medications and albuterol but will also give a course of prednisone.  She has a chronic pain syndrome which is unchanged.  ROGER run and reviewed.  Risks of the medication include but are not limited to fatigue, somnolence,  increased risk of falls, constipation, allergic reaction, dependence, and addiction         · COVID-19 Precautions - Patient was compliant in wearing a mask. When I saw the patient, I used appropriate personal protective equipment (PPE) including mask and eye shield (standard procedure).  Additionally, I used gown and gloves if indicated.  Hand hygiene was completed before and after seeing the patient.  · Dictated utilizing Dragon Dictation

## 2023-02-02 LAB
BACTERIA UR CULT: ABNORMAL
BACTERIA UR CULT: ABNORMAL
OTHER ANTIBIOTIC SUSC ISLT: ABNORMAL

## 2023-02-19 DIAGNOSIS — K21.9 GASTROESOPHAGEAL REFLUX DISEASE WITHOUT ESOPHAGITIS: ICD-10-CM

## 2023-02-19 DIAGNOSIS — F51.01 PRIMARY INSOMNIA: ICD-10-CM

## 2023-02-20 RX ORDER — AMITRIPTYLINE HYDROCHLORIDE 100 MG/1
100 TABLET, FILM COATED ORAL
Qty: 90 TABLET | Refills: 1 | Status: SHIPPED | OUTPATIENT
Start: 2023-02-20

## 2023-02-20 RX ORDER — PANTOPRAZOLE SODIUM 40 MG/1
40 TABLET, DELAYED RELEASE ORAL DAILY
Qty: 90 TABLET | Refills: 1 | Status: SHIPPED | OUTPATIENT
Start: 2023-02-20

## 2023-02-20 NOTE — TELEPHONE ENCOUNTER
LOV             1/30/2023   NOV             5/1/2023   Last RF        11/28/22  Metoprolol                         8/25/22  Amitripyline    Debra Richardson, NRCMA/LMR

## 2023-02-21 DIAGNOSIS — I10 ESSENTIAL HYPERTENSION: ICD-10-CM

## 2023-02-21 RX ORDER — METOPROLOL TARTRATE 100 MG/1
TABLET ORAL
Qty: 180 TABLET | Refills: 0 | Status: SHIPPED | OUTPATIENT
Start: 2023-02-21

## 2023-03-01 DIAGNOSIS — G89.4 CHRONIC PAIN SYNDROME: ICD-10-CM

## 2023-03-01 DIAGNOSIS — M15.9 PRIMARY OSTEOARTHRITIS INVOLVING MULTIPLE JOINTS: ICD-10-CM

## 2023-03-02 RX ORDER — HYDROCODONE BITARTRATE AND ACETAMINOPHEN 7.5; 325 MG/1; MG/1
1 TABLET ORAL EVERY 8 HOURS PRN
Qty: 90 TABLET | Refills: 0 | Status: SHIPPED | OUTPATIENT
Start: 2023-03-02 | End: 2023-03-30 | Stop reason: SDUPTHER

## 2023-03-02 NOTE — TELEPHONE ENCOUNTER
LOV             1/30/2023   NOV             5/1/2023   Last RF         1/30/23    CHAYO Crook/JOCELINE     Rx Opioid Analgesics Protocol Failed 03/01/2023 05:34 PM    Patient has  had a urine drug screen in the past 12 months    Patient has a opioid pain agreement scanned in chart     Rx Controlled Substance Protocol Failed    Urine Toxicology Performed in Last 12 Months    Medication not refilled in past 28 days

## 2023-03-30 DIAGNOSIS — M15.9 PRIMARY OSTEOARTHRITIS INVOLVING MULTIPLE JOINTS: ICD-10-CM

## 2023-03-30 DIAGNOSIS — G89.4 CHRONIC PAIN SYNDROME: ICD-10-CM

## 2023-03-30 RX ORDER — HYDROCODONE BITARTRATE AND ACETAMINOPHEN 7.5; 325 MG/1; MG/1
1 TABLET ORAL EVERY 8 HOURS PRN
Qty: 90 TABLET | Refills: 0 | Status: SHIPPED | OUTPATIENT
Start: 2023-03-30

## 2023-04-05 ENCOUNTER — TELEPHONE (OUTPATIENT)
Dept: FAMILY MEDICINE CLINIC | Facility: CLINIC | Age: 68
End: 2023-04-05

## 2023-04-05 NOTE — TELEPHONE ENCOUNTER
"  Caller: JeronimoLula \"gulshan\"    Relationship: Self    Best call back number:[phone]  What is the best time to reach you:ANYTIME   Who are you requesting to speak with (clinical staff, provider,  specific staff member):CLINICAL STAFF  Do you know the name of the person who called: SELF   What was the call regarding: PATIENT HAS LEFT SWOLLEN ELBOW AND WOULD LIKE TO SEE YOU OR YOUR NURSE NEXT WEEK.PLEASE CALL    Do you require a callback:YES       "

## 2023-04-11 ENCOUNTER — OFFICE VISIT (OUTPATIENT)
Dept: FAMILY MEDICINE CLINIC | Facility: CLINIC | Age: 68
End: 2023-04-11
Payer: MEDICARE

## 2023-04-11 VITALS
WEIGHT: 171 LBS | HEIGHT: 66 IN | OXYGEN SATURATION: 99 % | SYSTOLIC BLOOD PRESSURE: 160 MMHG | HEART RATE: 93 BPM | TEMPERATURE: 98 F | DIASTOLIC BLOOD PRESSURE: 100 MMHG | BODY MASS INDEX: 27.48 KG/M2

## 2023-04-11 DIAGNOSIS — R10.816 EPIGASTRIC ABDOMINAL TENDERNESS WITHOUT REBOUND TENDERNESS: ICD-10-CM

## 2023-04-11 DIAGNOSIS — K21.9 GASTROESOPHAGEAL REFLUX DISEASE WITHOUT ESOPHAGITIS: ICD-10-CM

## 2023-04-11 DIAGNOSIS — M25.421 PAIN AND SWELLING OF RIGHT ELBOW: ICD-10-CM

## 2023-04-11 DIAGNOSIS — L03.113 CELLULITIS OF RIGHT UPPER LIMB: Primary | ICD-10-CM

## 2023-04-11 DIAGNOSIS — I10 PRIMARY HYPERTENSION: ICD-10-CM

## 2023-04-11 DIAGNOSIS — M25.521 PAIN AND SWELLING OF RIGHT ELBOW: ICD-10-CM

## 2023-04-11 PROCEDURE — 1160F RVW MEDS BY RX/DR IN RCRD: CPT | Performed by: NURSE PRACTITIONER

## 2023-04-11 PROCEDURE — 99214 OFFICE O/P EST MOD 30 MIN: CPT | Performed by: NURSE PRACTITIONER

## 2023-04-11 PROCEDURE — 1159F MED LIST DOCD IN RCRD: CPT | Performed by: NURSE PRACTITIONER

## 2023-04-11 PROCEDURE — 3077F SYST BP >= 140 MM HG: CPT | Performed by: NURSE PRACTITIONER

## 2023-04-11 PROCEDURE — 3080F DIAST BP >= 90 MM HG: CPT | Performed by: NURSE PRACTITIONER

## 2023-04-11 NOTE — PATIENT INSTRUCTIONS
Monitor your blood pressure periodically and record results.  Call if your blood pressure is consistently greater than 140/90.  Follow up pending lab results.  Follow up in 2 weeks, or sooner if symptoms persist or worsen.

## 2023-04-11 NOTE — PROGRESS NOTES
Subjective   Lula Decker is a 67 y.o. female.     Chief Complaint   Patient presents with   • Elbow Injury     Right elbow and arm pain red and painful          History of Present Illness   Patient presents with c/o right elbow and arm red and painful; no known injury; about 4-5 weeks ago, woke up and had redness from above and below elbow; had pain from shoulder down to hand; had place on elbow that was infected; applied antibiotic and rested for 1 week and had resolved, then worse again; was seen at [facility] on 3/24/23 and marked area of erythema and told to go to ER if worse; started on Doxycycline; symptoms have improved with Doxycycline, but still having discomfort; has cramping in right forearm; finished Doxycycline about 10 days ago; pain has also improved, but has discomfort in other areas than did before; now has knot distal to elbow and was not there before; still has some redness, but has improved; previously arm looked beat red; has chronic pain in bilateral shoulders due to thoracic outlet syndrome, but symptoms worse.    Takes Gemtesa for OAB; has tried several medications in past and did not help; had bladder sling surgery and did not help more than 6 months.    F/U HTN: takes Metoprolol twice daily; has not been monitoring BP recently; occasional headaches; no change in mild swelling in ankles and feet; occasional orthostasis if gets up too fast or if bends over and raises up.      The following portions of the patient's history were reviewed and updated as appropriate: allergies, current medications, past family history, past medical history, past social history, past surgical history and problem list.    Current Outpatient Medications on File Prior to Visit   Medication Sig   • albuterol sulfate  (90 Base) MCG/ACT inhaler INHALE 2 PUFFS INTO THE LUNGS EVERY 4 HOURS AS NEEDED   • amitriptyline (ELAVIL) 100 MG tablet Take 1 tablet by mouth every night at bedtime.   • arformoterol (BROVANA)  15 MCG/2ML nebulizer solution Take  by nebulization 2 (Two) Times a Day.   • budesonide (PULMICORT) 0.5 MG/2ML nebulizer solution Take 2 mL by nebulization Daily.   • CRANBERRY PO Take 1 tablet by mouth Daily.   • doxylamine (UNISOM) 25 MG tablet Take 1 tablet by mouth At Night As Needed.   • HYDROcodone-acetaminophen (NORCO) 7.5-325 MG per tablet Take 1 tablet by mouth Every 8 (Eight) Hours As Needed for Moderate Pain.   • hydrocortisone 2.5 % cream Apply 2.5 application topically to the appropriate area as directed 3 (Three) Times a Day.   • ipratropium-albuterol (DUO-NEB) 0.5-2.5 mg/3 ml nebulizer Take 3 mL by nebulization 4 (Four) Times a Day.   • metoprolol tartrate (LOPRESSOR) 100 MG tablet TAKE 1 TABLET BY MOUTH TWICE DAILY   • pantoprazole (PROTONIX) 40 MG EC tablet Take 1 tablet by mouth Daily.   • vitamin B-12 (CYANOCOBALAMIN) 1000 MCG tablet Take 1 tablet by mouth Daily.   • rosuvastatin (CRESTOR) 10 MG tablet TAKE 1 TABLET BY MOUTH EVERY DAY (Patient not taking: Reported on 4/11/2023)   • triamcinolone (KENALOG) 0.1 % cream Apply  topically to the appropriate area as directed 2 (Two) Times a Day. (Patient not taking: Reported on 4/11/2023)     No current facility-administered medications on file prior to visit.        Past Medical History:   Diagnosis Date   • Abnormal findings on diagnostic imaging of abdomen    • Alopecia areata    • Asthma    • Bleeding external hemorrhoids    • BMI 25.0-25.9,adult    • Calf cramp    • Carpal tunnel syndrome    • Chest pain    • Chest wall pain    • Constipation    • COPD (chronic obstructive pulmonary disease)    • COPD with acute exacerbation    • Discoloration of skin of foot    • Dyslipidemia    • Dysuria    • Eczema    • Encounter for immunization    • Esophageal reflux    • Fatty liver    • Fibromyalgia, primary    • GERD (gastroesophageal reflux disease)    • Headache    • Hives of unknown origin    • Hyperactivity of bladder    • Hypertension    • IBS  (irritable bowel syndrome)    • Incontinence of urine    • Insomnia    • Menopausal symptom    • Migraine    • MRSA exposure    • Muscle spasm    • Nocturia    • OA (osteoarthritis)    • Obstructive chronic bronchitis with acute exacerbation    • Oral thrush    • Osteoarthritis of left knee    • Osteoporosis    • PAD (peripheral artery disease)    • Sinusitis, acute    • Synovial cyst of popliteal space    • Tremor    • Tubular adenoma of colon    • Urinary frequency    • Urinary urgency    • Urinary, incontinence, stress female    • UTI (urinary tract infection)        Past Surgical History:   Procedure Laterality Date   • BREAST LUMPECTOMY     • BREAST SURGERY     • COLONOSCOPY     • EYE SURGERY  catract surgery   • HYSTERECTOMY      Not due to cancer   • INCONTINENCE SURGERY     • NECK SURGERY     • NEUROPLASTY      Median nerve at carpal tunnel.   • OOPHORECTOMY     • OTHER SURGICAL HISTORY  08/20/2014    Esophadogastroduodenoscopy - Gastritis    • TONSILLECTOMY     • TUBAL ABDOMINAL LIGATION         Family History   Problem Relation Age of Onset   • Heart failure Father    • Heart disease Father        Social History     Socioeconomic History   • Marital status:    Tobacco Use   • Smoking status: Every Day     Packs/day: 0.50     Years: 50.00     Pack years: 25.00     Types: Cigarettes   • Smokeless tobacco: Never   Substance and Sexual Activity   • Alcohol use: Yes   • Drug use: No   • Sexual activity: Defer       Review of Systems   Constitutional: Positive for fatigue. Negative for appetite change, chills, fever, unexpected weight gain and unexpected weight loss.   Respiratory: Positive for cough (some at times) and shortness of breath (wears 3 liters O2 per nasal cannula). Negative for chest tightness.    Cardiovascular: Negative for chest pain. Palpitations: some increased HR at times.   Gastrointestinal: Negative for blood in stool (rare with hemorrhoids). Abdominal pain: some in upper abdomen at  "times; resolves with bending over for period of time. Constipation: some at times with IBS. Acid reflux: some at times, takes Pantoprazole daily and helps; also takes OTC Pepcid at times and helps.   Genitourinary: Positive for frequency (no change). Negative for dysuria.   Musculoskeletal: Positive for neck pain (see HPI).       Objective   Vitals:    04/11/23 1418   BP: 160/100   BP Location: Left arm   Patient Position: Sitting   Cuff Size: Adult   Pulse: 93   Temp: 98 °F (36.7 °C)   SpO2: 99%   Weight: 77.6 kg (171 lb)   Height: 167.6 cm (66\")     Body mass index is 27.6 kg/m².    Physical Exam  Vitals and nursing note reviewed.   Constitutional:       General: She is not in acute distress.     Appearance: She is well-developed and well-groomed. She is not diaphoretic.   HENT:      Head: Normocephalic.      Right Ear: External ear normal.      Left Ear: External ear normal.   Eyes:      Conjunctiva/sclera: Conjunctivae normal.   Neck:      Vascular: No carotid bruit.   Cardiovascular:      Rate and Rhythm: Normal rate and regular rhythm.      Pulses: Normal pulses.      Heart sounds: Normal heart sounds. No murmur heard.  Pulmonary:      Effort: Pulmonary effort is normal. No respiratory distress.      Breath sounds: Decreased breath sounds (some) and wheezing present. No rales.   Abdominal:      General: Bowel sounds are normal.      Palpations: Abdomen is soft. There is no hepatomegaly or splenomegaly.      Tenderness: There is abdominal tenderness in the epigastric area. There is no rebound. Guarding: mild.   Musculoskeletal:      Right elbow: Normal range of motion.        Arms:       Cervical back: Normal range of motion and neck supple.      Right lower leg: No edema.      Left lower leg: No edema.   Skin:     General: Skin is warm and dry.      Findings: No rash.   Neurological:      Mental Status: She is alert and oriented to person, place, and time.      Gait: Abnormal gait: guarded gait.   Psychiatric: "         Mood and Affect: Mood normal.         Behavior: Behavior normal.         Thought Content: Thought content normal.         Cognition and Memory: Cognition normal.         Judgment: Judgment normal.         Lab Results   Component Value Date    CHLPL 203 (H) 03/31/2022    TRIG 222 (H) 03/31/2022    HDL 57 03/31/2022    VLDL 38 03/31/2022     (H) 03/31/2022     Lab Results   Component Value Date    TSH 1.870 03/08/2021     10/28/22 CBC WNL except WBC 11.99, Hgb 11.7, neut 89.1, lymph 2.7; CMP WNL; mag 1.8      Assessment    Problem List Items Addressed This Visit     Hypertension    Current Assessment & Plan     Hypertension is increased today.  Continue current medications.  Ambulatory blood pressure monitoring.  Blood pressure will be reassessed in 2 weeks.  Continue Metoprolol twice daily.  Call if your blood pressure is consistently greater than 140/90.         GERD (gastroesophageal reflux disease)    Current Assessment & Plan     Continue Pantoprazole daily.         Pain and swelling of right elbow    Current Assessment & Plan     Improving.  Continue to monitor for signs of infection, such as redness, swelling, drainage, etc.         Relevant Orders    CBC & Differential (Completed)    Comprehensive Metabolic Panel (Completed)    Ambulatory Referral to Orthopedic Surgery    Epigastric abdominal tenderness without rebound tenderness    Relevant Orders    CBC & Differential (Completed)    Comprehensive Metabolic Panel (Completed)    Lipase (Completed)    Cellulitis of right upper limb - Primary    Current Assessment & Plan     Resolving.         Relevant Orders    CBC & Differential (Completed)    Comprehensive Metabolic Panel (Completed)    Ambulatory Referral to Orthopedic Surgery        Return in about 2 weeks (around 4/25/2023).or sooner if symptoms persist or worsen.

## 2023-04-12 LAB
ALBUMIN SERPL-MCNC: 4.3 G/DL (ref 3.8–4.8)
ALBUMIN/GLOB SERPL: 1.4 {RATIO} (ref 1.2–2.2)
ALP SERPL-CCNC: 82 IU/L (ref 44–121)
ALT SERPL-CCNC: 16 IU/L (ref 0–32)
AST SERPL-CCNC: 28 IU/L (ref 0–40)
BASOPHILS # BLD AUTO: 0.1 X10E3/UL (ref 0–0.2)
BASOPHILS NFR BLD AUTO: 1 %
BILIRUB SERPL-MCNC: 0.2 MG/DL (ref 0–1.2)
BUN SERPL-MCNC: 9 MG/DL (ref 8–27)
BUN/CREAT SERPL: 12 (ref 12–28)
CALCIUM SERPL-MCNC: 9.2 MG/DL (ref 8.7–10.3)
CHLORIDE SERPL-SCNC: 97 MMOL/L (ref 96–106)
CO2 SERPL-SCNC: 25 MMOL/L (ref 20–29)
CREAT SERPL-MCNC: 0.77 MG/DL (ref 0.57–1)
EGFRCR SERPLBLD CKD-EPI 2021: 84 ML/MIN/1.73
EOSINOPHIL # BLD AUTO: 0.2 X10E3/UL (ref 0–0.4)
EOSINOPHIL NFR BLD AUTO: 2 %
ERYTHROCYTE [DISTWIDTH] IN BLOOD BY AUTOMATED COUNT: 12.6 % (ref 11.7–15.4)
GLOBULIN SER CALC-MCNC: 3 G/DL (ref 1.5–4.5)
GLUCOSE SERPL-MCNC: 80 MG/DL (ref 70–99)
HCT VFR BLD AUTO: 38.6 % (ref 34–46.6)
HGB BLD-MCNC: 12.4 G/DL (ref 11.1–15.9)
IMM GRANULOCYTES # BLD AUTO: 0.1 X10E3/UL (ref 0–0.1)
IMM GRANULOCYTES NFR BLD AUTO: 1 %
LIPASE SERPL-CCNC: 32 U/L (ref 14–72)
LYMPHOCYTES # BLD AUTO: 3.1 X10E3/UL (ref 0.7–3.1)
LYMPHOCYTES NFR BLD AUTO: 33 %
MCH RBC QN AUTO: 29.9 PG (ref 26.6–33)
MCHC RBC AUTO-ENTMCNC: 32.1 G/DL (ref 31.5–35.7)
MCV RBC AUTO: 93 FL (ref 79–97)
MONOCYTES # BLD AUTO: 1 X10E3/UL (ref 0.1–0.9)
MONOCYTES NFR BLD AUTO: 11 %
NEUTROPHILS # BLD AUTO: 5 X10E3/UL (ref 1.4–7)
NEUTROPHILS NFR BLD AUTO: 52 %
PLATELET # BLD AUTO: 371 X10E3/UL (ref 150–450)
POTASSIUM SERPL-SCNC: 4.3 MMOL/L (ref 3.5–5.2)
PROT SERPL-MCNC: 7.3 G/DL (ref 6–8.5)
RBC # BLD AUTO: 4.15 X10E6/UL (ref 3.77–5.28)
SODIUM SERPL-SCNC: 138 MMOL/L (ref 134–144)
WBC # BLD AUTO: 9.4 X10E3/UL (ref 3.4–10.8)

## 2023-04-13 PROBLEM — M25.421 PAIN AND SWELLING OF RIGHT ELBOW: Status: ACTIVE | Noted: 2023-04-13

## 2023-04-13 PROBLEM — L03.113 CELLULITIS OF RIGHT UPPER LIMB: Status: ACTIVE | Noted: 2023-04-13

## 2023-04-13 PROBLEM — R10.816 EPIGASTRIC ABDOMINAL TENDERNESS WITHOUT REBOUND TENDERNESS: Status: ACTIVE | Noted: 2023-04-13

## 2023-04-13 PROBLEM — M25.521 PAIN AND SWELLING OF RIGHT ELBOW: Status: ACTIVE | Noted: 2023-04-13

## 2023-04-14 NOTE — ASSESSMENT & PLAN NOTE
Hypertension is increased today.  Continue current medications.  Ambulatory blood pressure monitoring.  Blood pressure will be reassessed in 2 weeks.  Continue Metoprolol twice daily.  Call if your blood pressure is consistently greater than 140/90.

## 2023-04-28 ENCOUNTER — OFFICE VISIT (OUTPATIENT)
Dept: ORTHOPEDIC SURGERY | Facility: CLINIC | Age: 68
End: 2023-04-28
Payer: MEDICARE

## 2023-04-28 VITALS — TEMPERATURE: 97.5 F | WEIGHT: 161.2 LBS | HEIGHT: 66 IN | BODY MASS INDEX: 25.91 KG/M2

## 2023-04-28 DIAGNOSIS — M25.521 ELBOW PAIN, RIGHT: ICD-10-CM

## 2023-04-28 DIAGNOSIS — R52 PAIN: Primary | ICD-10-CM

## 2023-04-28 PROCEDURE — 1160F RVW MEDS BY RX/DR IN RCRD: CPT | Performed by: ORTHOPAEDIC SURGERY

## 2023-04-28 PROCEDURE — 1159F MED LIST DOCD IN RCRD: CPT | Performed by: ORTHOPAEDIC SURGERY

## 2023-04-28 RX ORDER — DOXYCYCLINE HYCLATE 100 MG/1
100 CAPSULE ORAL
COMMUNITY
Start: 2023-04-21 | End: 2023-04-28

## 2023-04-28 NOTE — PROGRESS NOTES
New Elbow Notes:   Patient: Lula Decker    YOB: 1955    Medical Record Number: [id number]    Chief Complaints:  Right elbow pain    History of Present Illness:     67 y.o. female patient who presents with right elbow pain and swelling.  She says her symptoms for started about a month ago.  She developed a pressure sore on the back of her elbow.  Soon after that she developed redness and swelling extending down the arm.  She went to the emergency room and was diagnosed with cellulitis.  She was given doxycycline and the symptoms improved.  She says that every time she comes off the antibiotics the symptoms recur.  Just this past weekend she ended up having to go to the emergency room to get IV antibiotics.  She was given another round of doxycycline.  She is currently on that and the symptoms seem to be better but she still has some swelling and pain.  When asked to localize her pain she gestures diffusely around the elbow.  She reports pain when she tries to push or pull with the right arm.  She is very fearful that the symptoms are going to come right back as soon as she comes off the antibiotics.  Denies fevers, chills or sweats.  Denies any other joint pains or associated complaints.    Allergies   Allergen Reactions   • Sulfa Antibiotics Anaphylaxis   • Nystatin GI Intolerance     Nausea with oral solution       Home Medications:    Current Outpatient Medications:   •  albuterol sulfate  (90 Base) MCG/ACT inhaler, INHALE 2 PUFFS INTO THE LUNGS EVERY 4 HOURS AS NEEDED, Disp: 8.5 g, Rfl: 6  •  amitriptyline (ELAVIL) 100 MG tablet, Take 1 tablet by mouth every night at bedtime., Disp: 90 tablet, Rfl: 1  •  arformoterol (BROVANA) 15 MCG/2ML nebulizer solution, Take  by nebulization 2 (Two) Times a Day., Disp: , Rfl:   •  budesonide (PULMICORT) 0.5 MG/2ML nebulizer solution, Take 2 mL by nebulization Daily., Disp: , Rfl:   •  CRANBERRY PO, Take 1 tablet by mouth Daily., Disp: , Rfl:   •   doxycycline (VIBRAMYCIN) 100 MG capsule, 1 capsule., Disp: , Rfl:   •  doxylamine (UNISOM) 25 MG tablet, Take 1 tablet by mouth At Night As Needed., Disp: , Rfl:   •  HYDROcodone-acetaminophen (NORCO) 7.5-325 MG per tablet, Take 1 tablet by mouth Every 8 (Eight) Hours As Needed for Moderate Pain., Disp: 90 tablet, Rfl: 0  •  hydrocortisone 2.5 % cream, Apply 2.5 application topically to the appropriate area as directed 3 (Three) Times a Day., Disp: , Rfl:   •  ipratropium-albuterol (DUO-NEB) 0.5-2.5 mg/3 ml nebulizer, Take 3 mL by nebulization 4 (Four) Times a Day., Disp: 360 mL, Rfl: 3  •  metoprolol tartrate (LOPRESSOR) 100 MG tablet, TAKE 1 TABLET BY MOUTH TWICE DAILY, Disp: 180 tablet, Rfl: 0  •  pantoprazole (PROTONIX) 40 MG EC tablet, Take 1 tablet by mouth Daily., Disp: 90 tablet, Rfl: 1  •  rosuvastatin (CRESTOR) 10 MG tablet, TAKE 1 TABLET BY MOUTH EVERY DAY, Disp: 90 tablet, Rfl: 3  •  triamcinolone (KENALOG) 0.1 % cream, Apply  topically to the appropriate area as directed 2 (Two) Times a Day., Disp: 45 g, Rfl: 0  •  vitamin B-12 (CYANOCOBALAMIN) 1000 MCG tablet, Take 1 tablet by mouth Daily., Disp: , Rfl:      Past Medical History:   Diagnosis Date   • Abnormal findings on diagnostic imaging of abdomen    • Alopecia areata    • Ankle sprain right ankle   • Arthritis of neck cervical ribs   • Asthma    • Bleeding external hemorrhoids    • BMI 25.0-25.9,adult    • Calf cramp    • Carpal tunnel syndrome    • Cervical disc disorder cervical ribs 3surgries   • Chest pain    • Chest wall pain    • Constipation    • COPD (chronic obstructive pulmonary disease)    • COPD with acute exacerbation    • Discoloration of skin of foot    • Dyslipidemia    • Dysuria    • Eczema    • Encounter for immunization    • Esophageal reflux    • Fatty liver    • Fibromyalgia, primary    • GERD (gastroesophageal reflux disease)    • Headache    • Hives of unknown origin    • Hyperactivity of bladder    • Hypertension    • IBS  "(irritable bowel syndrome)    • Incontinence of urine    • Insomnia    • Menopausal symptom    • Migraine    • MRSA exposure    • Muscle spasm    • Nocturia    • OA (osteoarthritis)    • Obstructive chronic bronchitis with acute exacerbation    • Oral thrush    • Osteoarthritis of left knee    • Osteoporosis    • PAD (peripheral artery disease)    • Periarthritis of shoulder    • Sinusitis, acute    • Synovial cyst of popliteal space    • Thoracic disc disorder    • Tremor    • Tubular adenoma of colon    • Urinary frequency    • Urinary urgency    • Urinary, incontinence, stress female    • UTI (urinary tract infection)      Past Surgical History:   Procedure Laterality Date   • BREAST LUMPECTOMY     • BREAST SURGERY     • COLONOSCOPY     • EYE SURGERY  catract surgery   • HAND SURGERY  carpel tunnel   • HYSTERECTOMY      Not due to cancer   • INCONTINENCE SURGERY     • NECK SURGERY     • NEUROPLASTY      Median nerve at carpal tunnel.   • OOPHORECTOMY     • OTHER SURGICAL HISTORY  08/20/2014    Esophadogastroduodenoscopy - Gastritis    • TONSILLECTOMY     • TUBAL ABDOMINAL LIGATION     • WRIST SURGERY       Social History     Occupational History   • Not on file   Tobacco Use   • Smoking status: Every Day     Packs/day: 0.50     Years: 50.00     Pack years: 25.00     Types: Cigarettes   • Smokeless tobacco: Never   Substance and Sexual Activity   • Alcohol use: Yes     Comment: occasional beer with pizza   • Drug use: No   • Sexual activity: Defer      Social History     Social History Narrative   • Not on file     Family History   Problem Relation Age of Onset   • Heart failure Father    • Heart disease Father        Review of Systems:     10 point review of systems reviewed with the patient.  Pertinent positives listed above.  All others negative.    Physical Exam:   67 y.o. female  Vitals:    04/28/23 1120   Temp: 97.5 °F (36.4 °C)   Weight: 73.1 kg (161 lb 3.2 oz)   Height: 167.6 cm (66\")     General:  Patient " is awake and alert.  Appears in no acute distress or discomfort.    Psych:  Affect and demeanor are appropriate.    Cardiovascular:  Regular rate and rhythm.    Lungs:  Good chest expansion.  Breathing unlabored.    Extremities:  Right elbow is examined.  Skin is benign.  She has some peeling skin over the back of her olecranon process but no skin breaks, erythema, lesions or ulcerations.  There is mild edema over the posterior aspect of the olecranon with some increased warmth in that area.  She has tenderness over the soft spot of the elbow but there is no obvious elbow effusion.  Elbow range of motion is nearly symmetric to the other side.  She lacks maybe 5 degrees of extension but her flexion is full.  Pronation and supination of the forearm are still full.  No instability.  Good strength with elbow flexion, extension, pronation, supination.  Intact sensation distally.  Palpable radial pulse.  Brisk cap refill.    Radiology:  AP and lateral views of the right elbow are ordered by myself and reviewed to evaluate the patient's complaint.  No comparison films are immediately available.  The x-rays show a questionable lytic area at the tip of the olecranon process.  I do not see any significant degenerative changes or other concerning findings.    Assessment/Plan: History of right elbow cellulitis and septic bursitis with persistent joint pain, possible component of chronic septic arthritis    I told her to continue the antibiotics as prescribed.  I am going to send her for an MRI to see if there is any evidence for septic arthritis.  The questionable lytic area noted on her x-rays is concerning.  I do not think there is enough fluid in the elbow that I can get anything out today. If there is evidence for septic arthritis we may have to consider a washout of her joint.  Obviously that is potentially very risky for her with her COPD and chronic oxygen use.  If the MRI is clean then may have to treat her with a  longer term course of oral antibiotics.  We will take that as it comes.  I will call her as soon as I see the results of the MRI.  I am going to try to get that done expeditiously.      Dieudonne Sapp MD    04/28/2023    CC to Nghia Eaton MD

## 2023-05-01 ENCOUNTER — OFFICE VISIT (OUTPATIENT)
Dept: FAMILY MEDICINE CLINIC | Facility: CLINIC | Age: 68
End: 2023-05-01
Payer: MEDICARE

## 2023-05-01 VITALS
TEMPERATURE: 97.5 F | OXYGEN SATURATION: 96 % | WEIGHT: 161.5 LBS | BODY MASS INDEX: 26.91 KG/M2 | SYSTOLIC BLOOD PRESSURE: 140 MMHG | HEIGHT: 65 IN | DIASTOLIC BLOOD PRESSURE: 80 MMHG | HEART RATE: 78 BPM

## 2023-05-01 DIAGNOSIS — M71.121 SEPTIC BURSITIS OF ELBOW, RIGHT: ICD-10-CM

## 2023-05-01 DIAGNOSIS — I10 PRIMARY HYPERTENSION: Primary | ICD-10-CM

## 2023-05-01 DIAGNOSIS — G89.4 CHRONIC PAIN SYNDROME: ICD-10-CM

## 2023-05-01 DIAGNOSIS — J44.9 CHRONIC OBSTRUCTIVE PULMONARY DISEASE, UNSPECIFIED COPD TYPE: ICD-10-CM

## 2023-05-01 DIAGNOSIS — M15.9 PRIMARY OSTEOARTHRITIS INVOLVING MULTIPLE JOINTS: ICD-10-CM

## 2023-05-01 PROCEDURE — 1160F RVW MEDS BY RX/DR IN RCRD: CPT | Performed by: INTERNAL MEDICINE

## 2023-05-01 PROCEDURE — 3077F SYST BP >= 140 MM HG: CPT | Performed by: INTERNAL MEDICINE

## 2023-05-01 PROCEDURE — 99213 OFFICE O/P EST LOW 20 MIN: CPT | Performed by: INTERNAL MEDICINE

## 2023-05-01 PROCEDURE — 3079F DIAST BP 80-89 MM HG: CPT | Performed by: INTERNAL MEDICINE

## 2023-05-01 PROCEDURE — 1159F MED LIST DOCD IN RCRD: CPT | Performed by: INTERNAL MEDICINE

## 2023-05-01 RX ORDER — HYDROCODONE BITARTRATE AND ACETAMINOPHEN 7.5; 325 MG/1; MG/1
1 TABLET ORAL EVERY 8 HOURS PRN
Qty: 90 TABLET | Refills: 0 | Status: SHIPPED | OUTPATIENT
Start: 2023-05-01

## 2023-05-01 NOTE — PROGRESS NOTES
Subjective   Lula Decker is a 67 y.o. female.     Chief Complaint   Patient presents with   • Shoulder Pain   • Hypertension       History of Present Illness   History of hypertension.  Currently, has been feeling well and asymptomatic without any headaches,vision changes, cough, chest pain, shortness of breath, swelling, focal neurologic deficit, memory loss or syncope.  Has been taking the medications regularly and adherent with the regimen of metoprolol tartrate 100 mg twice a day.  Denies medication side effects and no significant interval events.      Follow-up for cholesterol.  Currently, has been feeling well without any myalgias, muscle aches, weakness, numbness, chest pain, short of breath or other issues.  Currently, is adherent with medication regimen of crestor 10 mg and denies medication side effects. Is due for lab follow-up since was changed to crestor in Jan 2022 due to insurance.     Continued knee pain that intermittently flares and is severe in the right.  Has seen ortho and only options are injections or surgery but patient continues to refuse these options at this time.     Follow-up with GERD and intermittent nausea lasting 5- 10 minutes then self resolves.  This has been a chronic issue for a long time and unchanged.  She continues with the pantoprazole 40 mg daily.     Patient with COPD and on oxygen nasal canula and using the inhaled medications is stable and continues to see pulmonology.  Now on duo-neb, pulmicort and brovana.  Having progressive SOA and wheezing.     Continues to have chronic pain in the back, shoulders and knee.  Using the hydrocodone as needed and averages 2-3 times per day.  Denies side effects or issues with the problems or medications.  Having right elbow pain and swelling with septic bursitis and cellulitis.  Seeing Dr Sekou burton and has scan ordered.       The following portions of the patient's history were reviewed and updated as appropriate: allergies,  current medications, past family history, past medical history, past social history, past surgical history and problem list.    Depression Screen:      10/27/2022     1:00 PM   PHQ-2/PHQ-9 Depression Screening   Little Interest or Pleasure in Doing Things 0-->not at all   Feeling Down, Depressed or Hopeless 0-->not at all   PHQ-9: Brief Depression Severity Measure Score 0       Past Medical History:   Diagnosis Date   • Abnormal findings on diagnostic imaging of abdomen    • Alopecia areata    • Ankle sprain right ankle   • Arthritis of neck cervical ribs   • Asthma    • Bleeding external hemorrhoids    • BMI 25.0-25.9,adult    • Calf cramp    • Carpal tunnel syndrome    • Cervical disc disorder cervical ribs 3surgries   • Chest pain    • Chest wall pain    • Constipation    • COPD (chronic obstructive pulmonary disease)    • COPD with acute exacerbation    • Discoloration of skin of foot    • Dyslipidemia    • Dysuria    • Eczema    • Encounter for immunization    • Esophageal reflux    • Fatty liver    • Fibromyalgia, primary    • GERD (gastroesophageal reflux disease)    • Headache    • Hives of unknown origin    • Hyperactivity of bladder    • Hypertension    • IBS (irritable bowel syndrome)    • Incontinence of urine    • Insomnia    • Menopausal symptom    • Migraine    • MRSA exposure    • Muscle spasm    • Nocturia    • OA (osteoarthritis)    • Obstructive chronic bronchitis with acute exacerbation    • Oral thrush    • Osteoarthritis of left knee    • Osteoporosis    • PAD (peripheral artery disease)    • Periarthritis of shoulder    • Sinusitis, acute    • Synovial cyst of popliteal space    • Thoracic disc disorder    • Tremor    • Tubular adenoma of colon    • Urinary frequency    • Urinary urgency    • Urinary, incontinence, stress female    • UTI (urinary tract infection)        Past Surgical History:   Procedure Laterality Date   • BREAST LUMPECTOMY     • BREAST SURGERY     • COLONOSCOPY     • EYE  SURGERY  catract surgery   • HAND SURGERY  carpel tunnel   • HYSTERECTOMY      Not due to cancer   • INCONTINENCE SURGERY     • NECK SURGERY     • NEUROPLASTY      Median nerve at carpal tunnel.   • OOPHORECTOMY     • OTHER SURGICAL HISTORY  08/20/2014    Esophadogastroduodenoscopy - Gastritis    • TONSILLECTOMY     • TUBAL ABDOMINAL LIGATION     • WRIST SURGERY         Family History   Problem Relation Age of Onset   • Heart failure Father    • Heart disease Father        Social History     Socioeconomic History   • Marital status:    Tobacco Use   • Smoking status: Every Day     Packs/day: 0.50     Years: 50.00     Pack years: 25.00     Types: Cigarettes   • Smokeless tobacco: Never   Substance and Sexual Activity   • Alcohol use: Yes     Comment: occasional beer with pizza   • Drug use: No   • Sexual activity: Defer       Current Outpatient Medications   Medication Sig Dispense Refill   • albuterol sulfate  (90 Base) MCG/ACT inhaler INHALE 2 PUFFS INTO THE LUNGS EVERY 4 HOURS AS NEEDED 8.5 g 6   • amitriptyline (ELAVIL) 100 MG tablet Take 1 tablet by mouth every night at bedtime. 90 tablet 1   • arformoterol (BROVANA) 15 MCG/2ML nebulizer solution Take  by nebulization 2 (Two) Times a Day.     • budesonide (PULMICORT) 0.5 MG/2ML nebulizer solution Take 2 mL by nebulization Daily.     • CRANBERRY PO Take 1 tablet by mouth Daily.     • doxylamine (UNISOM) 25 MG tablet Take 1 tablet by mouth At Night As Needed.     • HYDROcodone-acetaminophen (NORCO) 7.5-325 MG per tablet Take 1 tablet by mouth Every 8 (Eight) Hours As Needed for Moderate Pain. 90 tablet 0   • hydrocortisone 2.5 % cream Apply 2.5 application topically to the appropriate area as directed 3 (Three) Times a Day.     • ipratropium-albuterol (DUO-NEB) 0.5-2.5 mg/3 ml nebulizer Take 3 mL by nebulization 4 (Four) Times a Day. 360 mL 3   • metoprolol tartrate (LOPRESSOR) 100 MG tablet TAKE 1 TABLET BY MOUTH TWICE DAILY 180 tablet 0   •  "pantoprazole (PROTONIX) 40 MG EC tablet Take 1 tablet by mouth Daily. 90 tablet 1   • triamcinolone (KENALOG) 0.1 % cream Apply  topically to the appropriate area as directed 2 (Two) Times a Day. 45 g 0   • vitamin B-12 (CYANOCOBALAMIN) 1000 MCG tablet Take 1 tablet by mouth Daily.       No current facility-administered medications for this visit.       Review of Systems   Constitutional: Negative for activity change, appetite change, fatigue, fever, unexpected weight gain and unexpected weight loss.   HENT: Negative for nosebleeds, rhinorrhea, trouble swallowing and voice change.    Eyes: Negative for visual disturbance.   Respiratory: Negative for cough, chest tightness, shortness of breath and wheezing.    Cardiovascular: Negative for chest pain, palpitations and leg swelling.   Gastrointestinal: Negative for abdominal pain, blood in stool, constipation, diarrhea, nausea, vomiting, GERD and indigestion.   Genitourinary: Negative for dysuria, frequency and hematuria.   Musculoskeletal: Negative for arthralgias, back pain and myalgias.        Right elbow tender and swollen.  Chronic shoulder and back pain.   Skin: Negative for rash and wound.   Neurological: Negative for dizziness, tremors, weakness, light-headedness, numbness, headache and memory problem.   Hematological: Negative for adenopathy. Does not bruise/bleed easily.   Psychiatric/Behavioral: Negative for sleep disturbance and depressed mood. The patient is not nervous/anxious.        Objective   /80 (BP Location: Left arm, Patient Position: Sitting, Cuff Size: Large Adult)   Pulse 78   Temp 97.5 °F (36.4 °C) (Temporal)   Ht 164.6 cm (64.8\")   Wt 73.3 kg (161 lb 8 oz)   SpO2 96%   BMI 27.04 kg/m²     Physical Exam  Vitals and nursing note reviewed.   Constitutional:       General: She is not in acute distress.     Appearance: She is well-developed. She is not diaphoretic.   HENT:      Head: Normocephalic and atraumatic.      Right Ear: " External ear normal.      Left Ear: External ear normal.      Nose: Nose normal.   Eyes:      Conjunctiva/sclera: Conjunctivae normal.      Pupils: Pupils are equal, round, and reactive to light.   Neck:      Thyroid: No thyromegaly.      Trachea: No tracheal deviation.   Cardiovascular:      Rate and Rhythm: Normal rate and regular rhythm.      Heart sounds: Normal heart sounds. No murmur heard.    No friction rub. No gallop.   Pulmonary:      Effort: Pulmonary effort is normal. No respiratory distress.      Breath sounds: Normal breath sounds.      Comments: Using oxygen 2L/NC  Abdominal:      General: Bowel sounds are normal.      Palpations: Abdomen is soft. There is no mass.      Tenderness: There is no abdominal tenderness. There is no guarding.   Musculoskeletal:         General: Normal range of motion.      Cervical back: Normal range of motion and neck supple.      Comments: Right elbow swollen and tender.   Lymphadenopathy:      Cervical: No cervical adenopathy.   Skin:     General: Skin is warm and dry.      Capillary Refill: Capillary refill takes less than 2 seconds.      Findings: No rash.   Neurological:      Mental Status: She is alert and oriented to person, place, and time.      Motor: No abnormal muscle tone.      Deep Tendon Reflexes: Reflexes normal.   Psychiatric:         Behavior: Behavior normal.         Thought Content: Thought content normal.         Judgment: Judgment normal.       Recent Results (from the past 2016 hour(s))   CBC & Differential    Collection Time: 04/11/23  3:25 PM    Specimen: Blood   Result Value Ref Range    WBC 9.4 3.4 - 10.8 x10E3/uL    RBC 4.15 3.77 - 5.28 x10E6/uL    Hemoglobin 12.4 11.1 - 15.9 g/dL    Hematocrit 38.6 34.0 - 46.6 %    MCV 93 79 - 97 fL    MCH 29.9 26.6 - 33.0 pg    MCHC 32.1 31.5 - 35.7 g/dL    RDW 12.6 11.7 - 15.4 %    Platelets 371 150 - 450 x10E3/uL    Neutrophil Rel % 52 Not Estab. %    Lymphocyte Rel % 33 Not Estab. %    Monocyte Rel % 11 Not  Estab. %    Eosinophil Rel % 2 Not Estab. %    Basophil Rel % 1 Not Estab. %    Neutrophils Absolute 5.0 1.4 - 7.0 x10E3/uL    Lymphocytes Absolute 3.1 0.7 - 3.1 x10E3/uL    Monocytes Absolute 1.0 (H) 0.1 - 0.9 x10E3/uL    Eosinophils Absolute 0.2 0.0 - 0.4 x10E3/uL    Basophils Absolute 0.1 0.0 - 0.2 x10E3/uL    Immature Granulocyte Rel % 1 Not Estab. %    Immature Grans Absolute 0.1 0.0 - 0.1 x10E3/uL   Comprehensive Metabolic Panel    Collection Time: 04/11/23  3:25 PM    Specimen: Blood   Result Value Ref Range    Glucose 80 70 - 99 mg/dL    BUN 9 8 - 27 mg/dL    Creatinine 0.77 0.57 - 1.00 mg/dL    EGFR Result 84 >59 mL/min/1.73    BUN/Creatinine Ratio 12 12 - 28    Sodium 138 134 - 144 mmol/L    Potassium 4.3 3.5 - 5.2 mmol/L    Chloride 97 96 - 106 mmol/L    Total CO2 25 20 - 29 mmol/L    Calcium 9.2 8.7 - 10.3 mg/dL    Total Protein 7.3 6.0 - 8.5 g/dL    Albumin 4.3 3.8 - 4.8 g/dL    Globulin 3.0 1.5 - 4.5 g/dL    A/G Ratio 1.4 1.2 - 2.2    Total Bilirubin 0.2 0.0 - 1.2 mg/dL    Alkaline Phosphatase 82 44 - 121 IU/L    AST (SGOT) 28 0 - 40 IU/L    ALT (SGPT) 16 0 - 32 IU/L   Lipase    Collection Time: 04/11/23  3:25 PM    Specimen: Blood   Result Value Ref Range    Lipase 32 14 - 72 U/L   SEDIMENTATION RATE    Collection Time: 04/21/23  6:39 PM    Specimen: Blood   Result Value Ref Range    Sed Rate 40 (H) 0 - 29 mm/Hr   AUTODIFF    Collection Time: 04/21/23  6:39 PM    Specimen: Blood   Result Value Ref Range    Neutrophil % 52.6 34.0 - 69.5 %    Lymphocyte % 28.7 20.0 - 53.0 %    Monocyte % 17.1 (H) 5.0 - 12.5 %    Eosinophil % 1.1 0.7 - 6.0 %    Basophil % 0.5 0.0 - 3.0 %    Neutrophils Absolute 5.00 1.70 - 6.00 x10(3)/ul    Lymphocytes Absolute 2.7 0.8 - 3.2 x10(3)/ul    Monocytes Absolute 1.6 (H) 0.2 - 1.4 x10(3)/ul    Eosinophils Absolute 0.1 0.0 - 0.6 x10(3)/ul    Basophils Absolute 0.1 0.0 - 0.3 x10(3)/ul   HIGH SENSITIVITY CRP    Collection Time: 04/21/23  8:07 PM    Specimen: Blood   Result Value  Ref Range    C-Reactive Protein 126.12 (H) <=7.48 mg/Liter     Assessment & Plan   Diagnoses and all orders for this visit:    1. Primary hypertension (Primary)    2. Chronic pain syndrome  -     HYDROcodone-acetaminophen (NORCO) 7.5-325 MG per tablet; Take 1 tablet by mouth Every 8 (Eight) Hours As Needed for Moderate Pain.  Dispense: 90 tablet; Refill: 0    3. Primary osteoarthritis involving multiple joints  -     HYDROcodone-acetaminophen (NORCO) 7.5-325 MG per tablet; Take 1 tablet by mouth Every 8 (Eight) Hours As Needed for Moderate Pain.  Dispense: 90 tablet; Refill: 0    4. Septic bursitis of elbow, right    5. Chronic obstructive pulmonary disease, unspecified COPD type    ROGER ordered but system is not processing requests at this time.  Risks of the medication include but are not limited to fatigue, somnolence, increased risk of falls, constipation, allergic reaction, dependence, and addiction.  Continue to follow up with Dr Sapp for the elbow and continue the current pain medications.         · COVID-19 Precautions - Patient was compliant in wearing a mask. When I saw the patient, I used appropriate personal protective equipment (PPE) including mask and eye shield (standard procedure).  Additionally, I used gown and gloves if indicated.  Hand hygiene was completed before and after seeing the patient.  · Dictated utilizing Dragon Dictation

## 2023-05-03 ENCOUNTER — TELEPHONE (OUTPATIENT)
Dept: ORTHOPEDIC SURGERY | Facility: CLINIC | Age: 68
End: 2023-05-03

## 2023-05-03 NOTE — TELEPHONE ENCOUNTER
"  Caller: Lula Decker \"gulshan\"    Relationship: Self    Best call back number: [phone]    What orders are you requesting (i.e. lab or imaging): RT ELBOW MRI     In what timeframe would the patient need to come in: ASAP    [facility] [phone]    Additional notes: PT WAS OFFERED SOONER APPT.   "
Pt wants MRI completed at [facility]. 
Endometritis

## 2023-05-10 ENCOUNTER — TELEPHONE (OUTPATIENT)
Dept: ORTHOPEDIC SURGERY | Facility: CLINIC | Age: 68
End: 2023-05-10
Payer: MEDICARE

## 2023-05-10 NOTE — TELEPHONE ENCOUNTER
I HAD THIS PATIENT SCHDEDULED LAS Saturday 05-06-23 AT [facility] , SHE DID NOT GO, SHE NOW WANTS TO BE SCHEDULED AT [facility], I WILL RESCHEDULE HER THERE,LLR

## 2023-05-17 DIAGNOSIS — M25.521 ELBOW PAIN, RIGHT: ICD-10-CM

## 2023-05-18 ENCOUNTER — TELEPHONE (OUTPATIENT)
Dept: ORTHOPEDIC SURGERY | Facility: HOSPITAL | Age: 68
End: 2023-05-18
Payer: MEDICARE

## 2023-05-18 DIAGNOSIS — I10 ESSENTIAL HYPERTENSION: ICD-10-CM

## 2023-05-18 RX ORDER — METOPROLOL TARTRATE 100 MG/1
TABLET ORAL
Qty: 180 TABLET | Refills: 1 | Status: SHIPPED | OUTPATIENT
Start: 2023-05-18

## 2023-05-18 NOTE — TELEPHONE ENCOUNTER
LOV             5/1/2023   NOV             8/7/2023   Last RF        2/21/23    Debra Richardson, YUNIA/LMR

## 2023-05-18 NOTE — TELEPHONE ENCOUNTER
I spoke with her and gave her the MRI results.  The MRI did show a lot of marrow edema in the ulna and some dorsal swelling.  Given her history I was concerned that this could represent infection.  She says that she has been doing very well lately.  She says that her pain is tremendously improved and all of the swelling seems to be gone.  She feels like the elbow is doing very well.  I counseled her about the findings and I told her to keep a close eye on things.  If her symptoms start to flareup again then she needs to notify me immediately.  I am going to make her an appointment to see me in a few weeks just to make sure everything looks okay.  If she is doing great at that appointment then I will probably release her.

## 2023-05-19 NOTE — TELEPHONE ENCOUNTER
Spoke with patient to schedule follow up and she cannot do 4:20 which is the only open spot you have. I went ahead and put her on Madisyn's schedule for 6/2.

## 2023-05-25 DIAGNOSIS — G89.4 CHRONIC PAIN SYNDROME: ICD-10-CM

## 2023-05-25 DIAGNOSIS — M15.9 PRIMARY OSTEOARTHRITIS INVOLVING MULTIPLE JOINTS: ICD-10-CM

## 2023-05-25 RX ORDER — HYDROCODONE BITARTRATE AND ACETAMINOPHEN 7.5; 325 MG/1; MG/1
1 TABLET ORAL EVERY 8 HOURS PRN
Qty: 90 TABLET | Refills: 0 | Status: SHIPPED | OUTPATIENT
Start: 2023-05-25

## 2023-07-30 DIAGNOSIS — M15.9 PRIMARY OSTEOARTHRITIS INVOLVING MULTIPLE JOINTS: ICD-10-CM

## 2023-07-30 DIAGNOSIS — G89.4 CHRONIC PAIN SYNDROME: ICD-10-CM

## 2023-07-31 RX ORDER — HYDROCODONE BITARTRATE AND ACETAMINOPHEN 7.5; 325 MG/1; MG/1
1 TABLET ORAL EVERY 8 HOURS PRN
Qty: 90 TABLET | Refills: 0 | Status: SHIPPED | OUTPATIENT
Start: 2023-07-31

## 2023-08-07 ENCOUNTER — OFFICE VISIT (OUTPATIENT)
Dept: FAMILY MEDICINE CLINIC | Facility: CLINIC | Age: 68
End: 2023-08-07
Payer: MEDICARE

## 2023-08-07 VITALS
TEMPERATURE: 97.7 F | SYSTOLIC BLOOD PRESSURE: 136 MMHG | WEIGHT: 164.4 LBS | OXYGEN SATURATION: 99 % | DIASTOLIC BLOOD PRESSURE: 74 MMHG | BODY MASS INDEX: 27.39 KG/M2 | HEART RATE: 83 BPM | HEIGHT: 65 IN

## 2023-08-07 DIAGNOSIS — F51.01 PRIMARY INSOMNIA: ICD-10-CM

## 2023-08-07 DIAGNOSIS — L73.9 FOLLICULITIS: ICD-10-CM

## 2023-08-07 DIAGNOSIS — I10 PRIMARY HYPERTENSION: Primary | ICD-10-CM

## 2023-08-07 DIAGNOSIS — E78.5 HYPERLIPIDEMIA, UNSPECIFIED HYPERLIPIDEMIA TYPE: ICD-10-CM

## 2023-08-07 DIAGNOSIS — K21.9 GASTROESOPHAGEAL REFLUX DISEASE WITHOUT ESOPHAGITIS: ICD-10-CM

## 2023-08-07 PROCEDURE — 99214 OFFICE O/P EST MOD 30 MIN: CPT | Performed by: INTERNAL MEDICINE

## 2023-08-07 PROCEDURE — 1159F MED LIST DOCD IN RCRD: CPT | Performed by: INTERNAL MEDICINE

## 2023-08-07 PROCEDURE — 3078F DIAST BP <80 MM HG: CPT | Performed by: INTERNAL MEDICINE

## 2023-08-07 PROCEDURE — 1160F RVW MEDS BY RX/DR IN RCRD: CPT | Performed by: INTERNAL MEDICINE

## 2023-08-07 PROCEDURE — 3075F SYST BP GE 130 - 139MM HG: CPT | Performed by: INTERNAL MEDICINE

## 2023-08-07 RX ORDER — AMITRIPTYLINE HYDROCHLORIDE 100 MG/1
100 TABLET ORAL
Qty: 90 TABLET | Refills: 1 | Status: SHIPPED | OUTPATIENT
Start: 2023-08-07

## 2023-08-07 RX ORDER — PANTOPRAZOLE SODIUM 40 MG/1
40 TABLET, DELAYED RELEASE ORAL DAILY
Qty: 90 TABLET | Refills: 1 | Status: SHIPPED | OUTPATIENT
Start: 2023-08-07

## 2023-08-07 NOTE — PROGRESS NOTES
Subjective   Lula Decker is a 68 y.o. female.     Chief Complaint   Patient presents with    Hypertension    Hyperlipidemia    Mass     Has two bumps on the back of her head she wants  you to check for her.        History of Present Illness   Has 2 scabbed lesions back of head for several months.  No pain or itching.  Tends to pick the scabs off frequently.    Has had nasal congestion with drainage mainly at night.    History of hypertension.  Currently, has been feeling well and asymptomatic without any headaches,vision changes, cough, chest pain, shortness of breath, swelling, focal neurologic deficit, memory loss or syncope.  Has been taking the medications regularly and adherent with the regimen of metoprolol tartrate 100 mg twice a day.  Denies medication side effects and no significant interval events.      Follow-up for cholesterol.  Currently, has been feeling well without any myalgias, muscle aches, weakness, numbness, chest pain, short of breath or other issues.  Currently, is adherent with medication regimen of crestor 10 mg and denies medication side effects. Is due for lab follow-up since was changed to crestor in Jan 2022 due to insurance.     Follow-up with GERD and intermittent nausea lasting 5-10 minutes then self resolves.  This has been a chronic issue for a long time and unchanged.  She continues with the pantoprazole 40 mg daily.     Patient with COPD and on oxygen nasal canula and using the inhaled medications is stable and continues to see pulmonology.  Now on duo-neb, pulmicort and brovana.  Having progressive SOA and wheezing.     Continues to have chronic pain in the back, shoulders and knee.  Using the hydrocodone as needed and averages 2-3 times per day.  Denies side effects or issues with the problems or medications.  Having right elbow pain and swelling with septic bursitis and cellulitis.  Seeing Dr Sekou burton.  Continued knee pain that intermittently flares and is severe in the  right.  Has seen ortho and only options are injections or surgery but patient continues to refuse these options at this time.  Last script filled 7/31/23.     The following portions of the patient's history were reviewed and updated as appropriate: allergies, current medications, past family history, past medical history, past social history, past surgical history and problem list.    Depression Screen:      10/27/2022     1:00 PM   PHQ-2/PHQ-9 Depression Screening   Little Interest or Pleasure in Doing Things 0-->not at all   Feeling Down, Depressed or Hopeless 0-->not at all   PHQ-9: Brief Depression Severity Measure Score 0       Past Medical History:   Diagnosis Date    Abnormal findings on diagnostic imaging of abdomen     Alopecia areata     Ankle sprain right ankle    Arthritis of neck cervical ribs    Asthma     Bleeding external hemorrhoids     BMI 25.0-25.9,adult     Calf cramp     Carpal tunnel syndrome     Cervical disc disorder cervical ribs 3surgries    Chest pain     Chest wall pain     Constipation     COPD (chronic obstructive pulmonary disease)     COPD with acute exacerbation     Discoloration of skin of foot     Dyslipidemia     Dysuria     Eczema     Encounter for immunization     Esophageal reflux     Fatty liver     Fibromyalgia, primary     GERD (gastroesophageal reflux disease)     Headache     Hives of unknown origin     Hyperactivity of bladder     Hypertension     IBS (irritable bowel syndrome)     Incontinence of urine     Insomnia     Menopausal symptom     Migraine     MRSA exposure     Muscle spasm     Nocturia     OA (osteoarthritis)     Obstructive chronic bronchitis with acute exacerbation     Oral thrush     Osteoarthritis of left knee     Osteoporosis     PAD (peripheral artery disease)     Periarthritis of shoulder     Sinusitis, acute     Synovial cyst of popliteal space     Thoracic disc disorder     Tremor     Tubular adenoma of colon     Urinary frequency     Urinary urgency      Urinary, incontinence, stress female     UTI (urinary tract infection)        Past Surgical History:   Procedure Laterality Date    BREAST LUMPECTOMY      BREAST SURGERY      COLONOSCOPY      EYE SURGERY  catract surgery    HAND SURGERY  carpel tunnel    HYSTERECTOMY      Not due to cancer    INCONTINENCE SURGERY      NECK SURGERY      NEUROPLASTY      Median nerve at carpal tunnel.    OOPHORECTOMY      OTHER SURGICAL HISTORY  08/20/2014    Esophadogastroduodenoscopy - Gastritis     TONSILLECTOMY      TUBAL ABDOMINAL LIGATION      WRIST SURGERY         Family History   Problem Relation Age of Onset    Heart failure Father     Heart disease Father        Social History     Socioeconomic History    Marital status:    Tobacco Use    Smoking status: Every Day     Packs/day: 0.50     Years: 50.00     Pack years: 25.00     Types: Cigarettes    Smokeless tobacco: Never   Substance and Sexual Activity    Alcohol use: Yes     Comment: occasional beer with pizza    Drug use: No    Sexual activity: Defer       Current Outpatient Medications   Medication Sig Dispense Refill    albuterol sulfate  (90 Base) MCG/ACT inhaler INHALE 2 PUFFS INTO THE LUNGS EVERY 4 HOURS AS NEEDED 8.5 g 6    amitriptyline (ELAVIL) 100 MG tablet Take 1 tablet by mouth every night at bedtime. 90 tablet 1    arformoterol (BROVANA) 15 MCG/2ML nebulizer solution Take  by nebulization 2 (Two) Times a Day.      budesonide (PULMICORT) 0.5 MG/2ML nebulizer solution Take 2 mL by nebulization Daily.      CRANBERRY PO Take 1 tablet by mouth Daily.      doxylamine (UNISOM) 25 MG tablet Take 1 tablet by mouth At Night As Needed.      HYDROcodone-acetaminophen (NORCO) 7.5-325 MG per tablet Take 1 tablet by mouth Every 8 (Eight) Hours As Needed for Moderate Pain. 90 tablet 0    hydrocortisone 2.5 % cream Apply 2.5 application topically to the appropriate area as directed 3 (Three) Times a Day.      ipratropium-albuterol (DUO-NEB) 0.5-2.5 mg/3 ml  "nebulizer Take 3 mL by nebulization 4 (Four) Times a Day. 360 mL 3    metoprolol tartrate (LOPRESSOR) 100 MG tablet TAKE 1 TABLET BY MOUTH TWICE DAILY 180 tablet 1    pantoprazole (PROTONIX) 40 MG EC tablet Take 1 tablet by mouth Daily. 90 tablet 1    triamcinolone (KENALOG) 0.1 % cream Apply  topically to the appropriate area as directed 2 (Two) Times a Day. 45 g 0    vitamin B-12 (CYANOCOBALAMIN) 1000 MCG tablet Take 1 tablet by mouth Daily.       No current facility-administered medications for this visit.       Review of Systems   Constitutional:  Negative for activity change, appetite change, fatigue, fever, unexpected weight gain and unexpected weight loss.   HENT:  Negative for nosebleeds, rhinorrhea, trouble swallowing and voice change.    Eyes:  Negative for visual disturbance.   Respiratory:  Negative for cough, chest tightness, shortness of breath and wheezing.    Cardiovascular:  Negative for chest pain, palpitations and leg swelling.   Gastrointestinal:  Negative for abdominal pain, blood in stool, constipation, diarrhea, nausea, vomiting, GERD and indigestion.   Genitourinary:  Negative for dysuria, frequency and hematuria.   Musculoskeletal:  Negative for arthralgias, back pain and myalgias.   Skin:  Negative for rash and wound.        Posterior scalp with small scabs.   Neurological:  Negative for dizziness, tremors, weakness, light-headedness, numbness, headache and memory problem.   Hematological:  Negative for adenopathy. Does not bruise/bleed easily.   Psychiatric/Behavioral:  Negative for sleep disturbance and depressed mood. The patient is not nervous/anxious.      Objective   /74 (BP Location: Left arm, Patient Position: Sitting, Cuff Size: Large Adult)   Pulse 83   Temp 97.7 øF (36.5 øC) (Temporal)   Ht 164.6 cm (64.8\")   Wt 74.6 kg (164 lb 6.4 oz)   SpO2 99%   BMI 27.52 kg/mý     Physical Exam  Vitals and nursing note reviewed.   Constitutional:       General: She is not in acute " distress.     Appearance: She is well-developed. She is not diaphoretic.   HENT:      Head: Normocephalic and atraumatic.      Right Ear: External ear normal.      Left Ear: External ear normal.      Nose: Nose normal.   Eyes:      Conjunctiva/sclera: Conjunctivae normal.      Pupils: Pupils are equal, round, and reactive to light.   Neck:      Thyroid: No thyromegaly.      Trachea: No tracheal deviation.   Cardiovascular:      Rate and Rhythm: Normal rate and regular rhythm.      Heart sounds: Normal heart sounds. No murmur heard.    No friction rub. No gallop.   Pulmonary:      Effort: Pulmonary effort is normal. No respiratory distress.      Breath sounds: Normal breath sounds.      Comments: Wearing 2-3L NC oxygen.  Abdominal:      General: Bowel sounds are normal.      Palpations: Abdomen is soft. There is no mass.      Tenderness: There is no abdominal tenderness. There is no guarding.   Musculoskeletal:         General: Normal range of motion.      Cervical back: Normal range of motion and neck supple.   Lymphadenopathy:      Cervical: No cervical adenopathy.   Skin:     General: Skin is warm and dry.      Capillary Refill: Capillary refill takes less than 2 seconds.      Findings: No rash.   Neurological:      Mental Status: She is alert and oriented to person, place, and time.      Motor: No abnormal muscle tone.      Deep Tendon Reflexes: Reflexes normal.   Psychiatric:         Behavior: Behavior normal.         Thought Content: Thought content normal.         Judgment: Judgment normal.       No results found for this or any previous visit (from the past 2016 hour(s)).  Assessment & Plan   Diagnoses and all orders for this visit:    1. Primary hypertension (Primary)  -     Comprehensive Metabolic Panel  -     Lipid Panel    2. Hyperlipidemia, unspecified hyperlipidemia type  -     Comprehensive Metabolic Panel  -     Lipid Panel    3. Primary insomnia  -     amitriptyline (ELAVIL) 100 MG tablet; Take 1  tablet by mouth every night at bedtime.  Dispense: 90 tablet; Refill: 1    4. Gastroesophageal reflux disease without esophagitis  -     pantoprazole (PROTONIX) 40 MG EC tablet; Take 1 tablet by mouth Daily.  Dispense: 90 tablet; Refill: 1    5. Folliculitis      Hypertension controlled.  Continue the current medication.  Check the labs as ordered.  OTC antihistamine recommended such as allegra/claritin/zyrtec.       COVID-19 Precautions - Patient was compliant in wearing a mask. When I saw the patient, I used appropriate personal protective equipment (PPE) including mask and eye shield (standard procedure).  Additionally, I used gown and gloves if indicated.  Hand hygiene was completed before and after seeing the patient.  Dictated utilizing Dragon Dictation

## 2023-08-08 LAB
ALBUMIN SERPL-MCNC: 4.8 G/DL (ref 3.9–4.9)
ALBUMIN/GLOB SERPL: 1.7 {RATIO} (ref 1.2–2.2)
ALP SERPL-CCNC: 77 IU/L (ref 44–121)
ALT SERPL-CCNC: 13 IU/L (ref 0–32)
AST SERPL-CCNC: 23 IU/L (ref 0–40)
BILIRUB SERPL-MCNC: 0.2 MG/DL (ref 0–1.2)
BUN SERPL-MCNC: 7 MG/DL (ref 8–27)
BUN/CREAT SERPL: 9 (ref 12–28)
CALCIUM SERPL-MCNC: 9.3 MG/DL (ref 8.7–10.3)
CHLORIDE SERPL-SCNC: 99 MMOL/L (ref 96–106)
CHOLEST SERPL-MCNC: 331 MG/DL (ref 100–199)
CO2 SERPL-SCNC: 24 MMOL/L (ref 20–29)
CREAT SERPL-MCNC: 0.78 MG/DL (ref 0.57–1)
EGFRCR SERPLBLD CKD-EPI 2021: 83 ML/MIN/1.73
GLOBULIN SER CALC-MCNC: 2.8 G/DL (ref 1.5–4.5)
GLUCOSE SERPL-MCNC: 84 MG/DL (ref 70–99)
HDLC SERPL-MCNC: 69 MG/DL
LABORATORY COMMENT REPORT: ABNORMAL
LDLC SERPL CALC-MCNC: 207 MG/DL (ref 0–99)
POTASSIUM SERPL-SCNC: 4.2 MMOL/L (ref 3.5–5.2)
PROT SERPL-MCNC: 7.6 G/DL (ref 6–8.5)
SODIUM SERPL-SCNC: 140 MMOL/L (ref 134–144)
TRIGL SERPL-MCNC: 277 MG/DL (ref 0–149)
VLDLC SERPL CALC-MCNC: 55 MG/DL (ref 5–40)

## 2023-08-29 DIAGNOSIS — M15.9 PRIMARY OSTEOARTHRITIS INVOLVING MULTIPLE JOINTS: ICD-10-CM

## 2023-08-29 DIAGNOSIS — G89.4 CHRONIC PAIN SYNDROME: ICD-10-CM

## 2023-08-30 RX ORDER — HYDROCODONE BITARTRATE AND ACETAMINOPHEN 7.5; 325 MG/1; MG/1
1 TABLET ORAL EVERY 8 HOURS PRN
Qty: 90 TABLET | Refills: 0 | Status: SHIPPED | OUTPATIENT
Start: 2023-08-30

## 2023-08-30 NOTE — TELEPHONE ENCOUNTER
LOV             8/7/2023   NOV            12/4/2023   Last RF       7/31/23  Protocol      not met  Controlled Substance Med Protocol Failed    Urine Toxicology Performed in Last 12 Months    Controlled Substance Agreement is on file      CHAYO Crook/JOCELINE

## 2023-09-30 DIAGNOSIS — G89.4 CHRONIC PAIN SYNDROME: ICD-10-CM

## 2023-09-30 DIAGNOSIS — M15.9 PRIMARY OSTEOARTHRITIS INVOLVING MULTIPLE JOINTS: ICD-10-CM

## 2023-10-02 RX ORDER — HYDROCODONE BITARTRATE AND ACETAMINOPHEN 7.5; 325 MG/1; MG/1
1 TABLET ORAL EVERY 8 HOURS PRN
Qty: 90 TABLET | Refills: 0 | Status: SHIPPED | OUTPATIENT
Start: 2023-10-02

## 2023-10-29 DIAGNOSIS — G89.4 CHRONIC PAIN SYNDROME: ICD-10-CM

## 2023-10-29 DIAGNOSIS — M15.9 PRIMARY OSTEOARTHRITIS INVOLVING MULTIPLE JOINTS: ICD-10-CM

## 2023-10-30 RX ORDER — HYDROCODONE BITARTRATE AND ACETAMINOPHEN 7.5; 325 MG/1; MG/1
1 TABLET ORAL EVERY 8 HOURS PRN
Qty: 90 TABLET | Refills: 0 | Status: SHIPPED | OUTPATIENT
Start: 2023-10-30

## 2023-10-31 RX ORDER — ALBUTEROL SULFATE 90 UG/1
2 AEROSOL, METERED RESPIRATORY (INHALATION) EVERY 4 HOURS PRN
Qty: 8.5 G | Refills: 6 | Status: SHIPPED | OUTPATIENT
Start: 2023-10-31

## 2023-11-11 DIAGNOSIS — I10 ESSENTIAL HYPERTENSION: ICD-10-CM

## 2023-11-14 RX ORDER — METOPROLOL TARTRATE 100 MG/1
TABLET ORAL
Qty: 60 TABLET | Refills: 0 | Status: SHIPPED | OUTPATIENT
Start: 2023-11-14

## 2023-11-26 DIAGNOSIS — G89.4 CHRONIC PAIN SYNDROME: ICD-10-CM

## 2023-11-26 DIAGNOSIS — M15.9 PRIMARY OSTEOARTHRITIS INVOLVING MULTIPLE JOINTS: ICD-10-CM

## 2023-11-27 RX ORDER — HYDROCODONE BITARTRATE AND ACETAMINOPHEN 7.5; 325 MG/1; MG/1
1 TABLET ORAL EVERY 8 HOURS PRN
Qty: 90 TABLET | Refills: 0 | Status: SHIPPED | OUTPATIENT
Start: 2023-11-27 | End: 2023-11-28 | Stop reason: RX

## 2023-11-28 DIAGNOSIS — G89.4 CHRONIC PAIN SYNDROME: ICD-10-CM

## 2023-11-28 DIAGNOSIS — M15.9 PRIMARY OSTEOARTHRITIS INVOLVING MULTIPLE JOINTS: ICD-10-CM

## 2023-11-28 RX ORDER — HYDROCODONE BITARTRATE AND ACETAMINOPHEN 5; 325 MG/1; MG/1
TABLET ORAL
Qty: 90 TABLET | Refills: 0 | Status: SHIPPED | OUTPATIENT
Start: 2023-11-28

## 2023-11-30 ENCOUNTER — PRIOR AUTHORIZATION (OUTPATIENT)
Dept: FAMILY MEDICINE CLINIC | Facility: CLINIC | Age: 68
End: 2023-11-30
Payer: MEDICARE

## 2023-12-04 ENCOUNTER — OFFICE VISIT (OUTPATIENT)
Dept: FAMILY MEDICINE CLINIC | Facility: CLINIC | Age: 68
End: 2023-12-04
Payer: MEDICARE

## 2023-12-04 VITALS
HEART RATE: 89 BPM | OXYGEN SATURATION: 96 % | HEIGHT: 66 IN | WEIGHT: 163 LBS | BODY MASS INDEX: 26.2 KG/M2 | SYSTOLIC BLOOD PRESSURE: 128 MMHG | DIASTOLIC BLOOD PRESSURE: 78 MMHG

## 2023-12-04 DIAGNOSIS — E78.5 HYPERLIPIDEMIA, UNSPECIFIED HYPERLIPIDEMIA TYPE: ICD-10-CM

## 2023-12-04 DIAGNOSIS — Z23 IMMUNIZATION DUE: ICD-10-CM

## 2023-12-04 DIAGNOSIS — Z00.00 MEDICARE ANNUAL WELLNESS VISIT, SUBSEQUENT: Primary | ICD-10-CM

## 2023-12-04 DIAGNOSIS — I10 PRIMARY HYPERTENSION: ICD-10-CM

## 2023-12-04 PROBLEM — I73.9 PERIPHERAL VASCULAR DISEASE: Status: ACTIVE | Noted: 2023-12-04

## 2023-12-04 PROCEDURE — G0008 ADMIN INFLUENZA VIRUS VAC: HCPCS | Performed by: INTERNAL MEDICINE

## 2023-12-04 PROCEDURE — 1160F RVW MEDS BY RX/DR IN RCRD: CPT | Performed by: INTERNAL MEDICINE

## 2023-12-04 PROCEDURE — 3074F SYST BP LT 130 MM HG: CPT | Performed by: INTERNAL MEDICINE

## 2023-12-04 PROCEDURE — 1170F FXNL STATUS ASSESSED: CPT | Performed by: INTERNAL MEDICINE

## 2023-12-04 PROCEDURE — 90480 ADMN SARSCOV2 VAC 1/ONLY CMP: CPT | Performed by: INTERNAL MEDICINE

## 2023-12-04 PROCEDURE — 90662 IIV NO PRSV INCREASED AG IM: CPT | Performed by: INTERNAL MEDICINE

## 2023-12-04 PROCEDURE — 1159F MED LIST DOCD IN RCRD: CPT | Performed by: INTERNAL MEDICINE

## 2023-12-04 PROCEDURE — G0439 PPPS, SUBSEQ VISIT: HCPCS | Performed by: INTERNAL MEDICINE

## 2023-12-04 PROCEDURE — 91320 SARSCV2 VAC 30MCG TRS-SUC IM: CPT | Performed by: INTERNAL MEDICINE

## 2023-12-04 PROCEDURE — 3078F DIAST BP <80 MM HG: CPT | Performed by: INTERNAL MEDICINE

## 2023-12-04 NOTE — PROGRESS NOTES
The ABCs of the Annual Wellness Visit  Subsequent Medicare Wellness Visit    Subjective    Lula Decker is a 68 y.o. female who presents for a Subsequent Medicare Wellness Visit.    History of hypertension.  Currently, has been feeling well and asymptomatic without any headaches,vision changes, cough, chest pain, shortness of breath, swelling, focal neurologic deficit, memory loss or syncope.  Has been taking the medications regularly and adherent with the regimen of metoprolol tartrate 100 mg twice a day.  Denies medication side effects and no significant interval events.      Follow-up for cholesterol.  Currently, has been feeling well without any myalgias, muscle aches, weakness, numbness, chest pain, short of breath or other issues.  Currently, is adherent with medication regimen of crestor 10 mg and denies medication side effects. Is due for lab follow-up since was changed to crestor in Jan 2022 due to insurance.  Patient is adamant will not take any medications for the cholesterol     Follow-up with GERD and intermittent nausea lasting 5-10 minutes then self resolves.  This has been a chronic issue for a long time and unchanged.  She continues with the pantoprazole 40 mg daily.     Patient with COPD and on oxygen nasal canula and using the inhaled medications is stable and continues to see pulmonology.  Now on duo-neb, pulmicort and brovana.  Having progressive SOA and wheezing.     Continues to have chronic pain in the back, shoulders and knee.  Using the hydrocodone as needed and averages 2-3 times per day.  Denies side effects or issues with the problems or medications.  Having right elbow pain and swelling with septic bursitis and cellulitis.  Seeing Dr Sekou burton.  Continued knee pain that intermittently flares and is severe in the right.  Has seen ortho and only options are injections or surgery but patient continues to refuse these options at this time.  Last script filled 12/1/2023 for hydrocodone  5-325 due to availability issues.     The following portions of the patient's history were reviewed and updated as appropriate: allergies, current medications, past family history, past medical history, past social history, past surgical history, and problem list.    Compared to one year ago, the patient feels her physical health is the same.    Compared to one year ago, the patient feels her mental health is the same.    Recent Hospitalizations:  She was not admitted to the hospital during the last year.     Current Medical Providers:  Patient Care Team:  Nghia Eaton MD as PCP - General (Internal Medicine)  Jeb Smart MD as Consulting Physician (Orthopedic Surgery)  Stefani Prince MD (Pulmonary Disease)  Hank Sawant MD as Consulting Physician (Gastroenterology)    Outpatient Medications Prior to Visit   Medication Sig Dispense Refill    albuterol sulfate  (90 Base) MCG/ACT inhaler INHALE 2 PUFFS BY MOUTH EVERY 4 HOURS AS NEEDED 8.5 g 6    amitriptyline (ELAVIL) 100 MG tablet Take 1 tablet by mouth every night at bedtime. 90 tablet 1    arformoterol (BROVANA) 15 MCG/2ML nebulizer solution Take  by nebulization 2 (Two) Times a Day.      budesonide (PULMICORT) 0.5 MG/2ML nebulizer solution Take 2 mL by nebulization Daily.      CRANBERRY PO Take 1 tablet by mouth Daily.      doxylamine (UNISOM) 25 MG tablet Take 1 tablet by mouth At Night As Needed.      HYDROcodone-acetaminophen (NORCO) 5-325 MG per tablet Take 1 to 1.5 tablets every 8 hours as needed for pain. 90 tablet 0    hydrocortisone 2.5 % cream Apply 2.5 application topically to the appropriate area as directed 3 (Three) Times a Day.      ipratropium-albuterol (DUO-NEB) 0.5-2.5 mg/3 ml nebulizer Take 3 mL by nebulization 4 (Four) Times a Day. 360 mL 3    metoprolol tartrate (LOPRESSOR) 100 MG tablet TAKE 1 TABLET BY MOUTH TWICE DAILY 60 tablet 0    pantoprazole (PROTONIX) 40 MG EC tablet Take 1 tablet by mouth Daily. 90 tablet 1     triamcinolone (KENALOG) 0.1 % cream Apply  topically to the appropriate area as directed 2 (Two) Times a Day. 45 g 0    vitamin B-12 (CYANOCOBALAMIN) 1000 MCG tablet Take 1 tablet by mouth Daily.       No facility-administered medications prior to visit.     Opioid medication/s are on active medication list.  and I have evaluated her active treatment plan and pain score trends (see table).  There were no vitals filed for this visit.  I have reviewed the chart for potential of high risk medication and harmful drug interactions in the elderly.        Aspirin is not on active medication list.  Aspirin use is not indicated based on review of current medical condition/s. Risk of harm outweighs potential benefits.  .    Patient Active Problem List   Diagnosis    Chronic pain syndrome    Hypertension    GERD (gastroesophageal reflux disease)    UTI (urinary tract infection)    Right knee pain    Visual disturbances    Postmenopausal symptoms    Worsening headaches    Hyperlipidemia    Urinary symptom or sign    OA (osteoarthritis)    Edema of left ankle    Edema of left lower extremity due to peripheral venous insufficiency    Varicose veins of both lower extremities    Asthma    COPD (chronic obstructive pulmonary disease)    Insomnia    Rash of foot    Herpes zoster without complication    Discoloration of skin of foot    Skin lesion of right arm    Facial rash    Contact dermatitis    Mammogram declined    Medicare annual wellness visit, subsequent    Pain and swelling of right elbow    Epigastric abdominal tenderness without rebound tenderness    Cellulitis of right upper limb    Septic bursitis of elbow, right    Peripheral vascular disease     Advance Care Planning   Advance Care Planning     Advance Directive is not on file.  ACP discussion was held with the patient during this visit. Patient has an advance directive (not in EMR), copy requested.     Objective    Vitals:    12/04/23 1302   BP: 128/78   BP  "Location: Left arm   Patient Position: Sitting   Cuff Size: Adult   Pulse: 89   SpO2: 96%  Comment: 3 liters   Weight: 73.9 kg (163 lb)   Height: 167.6 cm (66\")     Estimated body mass index is 26.31 kg/m² as calculated from the following:    Height as of this encounter: 167.6 cm (66\").    Weight as of this encounter: 73.9 kg (163 lb).    BMI is >= 25 and <30. (Overweight) The following options were offered after discussion;: weight loss educational material (shared in after visit summary), exercise counseling/recommendations, and nutrition counseling/recommendations    Does the patient have evidence of cognitive impairment? No        HEALTH RISK ASSESSMENT    Smoking Status:  Social History     Tobacco Use   Smoking Status Every Day    Packs/day: 0.50    Years: 50.00    Additional pack years: 0.00    Total pack years: 25.00    Types: Cigarettes   Smokeless Tobacco Never     Alcohol Consumption:  Social History     Substance and Sexual Activity   Alcohol Use Yes    Comment: occasional beer with pizza     Fall Risk Screen:    STEADI Fall Risk Assessment was completed, and patient is at MODERATE risk for falls. Assessment completed on:2023    Depression Screenin/4/2023     1:04 PM   PHQ-2/PHQ-9 Depression Screening   Little Interest or Pleasure in Doing Things 0-->not at all   Feeling Down, Depressed or Hopeless 0-->not at all   PHQ-9: Brief Depression Severity Measure Score 0       Health Habits and Functional and Cognitive Screenin/4/2023     1:04 PM   Functional & Cognitive Status   Do you have difficulty preparing food and eating? No   Do you have difficulty bathing yourself, getting dressed or grooming yourself? No   Do you have difficulty using the toilet? No   Do you have difficulty moving around from place to place? No   Do you have trouble with steps or getting out of a bed or a chair? No   Current Diet Well Balanced Diet   Dental Exam Up to date        Eye Exam Comment no vision " insurance..blurred vision   Exercise (times per week) 0 times per week   Current Exercises Include Walking   Do you need help using the phone?  No   Are you deaf or do you have serious difficulty hearing?  No   Do you need help to go to places out of walking distance? No   Do you need help shopping? No   Do you need help preparing meals?  No   Do you need help with housework?  No   Do you need help with laundry? No   Do you need help taking your medications? No   Do you need help managing money? No   Do you ever drive or ride in a car without wearing a seat belt? No   Have you felt unusual stress, anger or loneliness in the last month? No   Who do you live with? Alone   If you need help, do you have trouble finding someone available to you? No   Have you been bothered in the last four weeks by sexual problems? No   Do you have difficulty concentrating, remembering or making decisions? No       Age-appropriate Screening Schedule:  Refer to the list below for future screening recommendations based on patient's age, sex and/or medical conditions. Orders for these recommended tests are listed in the plan section. The patient has been provided with a written plan.    Health Maintenance   Topic Date Due    TDAP/TD VACCINES (1 - Tdap) Never done    ZOSTER VACCINE (1 of 2) Never done    DXA SCAN  12/04/2023 (Originally 1955)    LIPID PANEL  08/07/2024    COLORECTAL CANCER SCREENING  08/20/2024    ANNUAL WELLNESS VISIT  12/04/2024    BMI FOLLOWUP  12/04/2024    MAMMOGRAM  12/05/2024    HEPATITIS C SCREENING  Completed    COVID-19 Vaccine  Completed    INFLUENZA VACCINE  Completed    Pneumococcal Vaccine 65+  Completed    LUNG CANCER SCREENING  Discontinued        CMS Preventative Services Quick Reference  Risk Factors Identified During Encounter  Immunizations Discussed/Encouraged: Tdap, Shingrix, COVID19, and RSV (Respiratory Syncytial Virus)  The above risks/problems have been discussed with the patient.  Pertinent  information has been shared with the patient in the After Visit Summary.  An After Visit Summary and PPPS were made available to the patient.  Diagnoses and all orders for this visit:    1. Medicare annual wellness visit, subsequent (Primary)    2. Primary hypertension    3. Hyperlipidemia, unspecified hyperlipidemia type    4. Immunization due  -     Fluzone High-Dose 65+yrs ([id number])  -     COVID-19 F23 (Pfizer) 12yrs+ (COMIRNATY)    Reviewed history and annual wellness visit with patient during office time.  Medications reviewed as appropriate.  Discussed advanced directives and living will.  Patient has living will: Living will: yes and patient will bring copy to office.  Discussed fall risk and precautions encourage removing throw rugs and using grab bars within the home and bathroom.  Will check the labs as ordered above to evaluate the blood sugars, kidney, liver, cholesterol for screening.  Discussed flu shot recommended to get the high-dose influenza vaccine annually in the fall.  The patient has a COVID HM Topic on their chart, and they are fully vaccinated..  Prevnar-13 and pneumovax-23 up to date and appropriate.  Shingrix vaccination series recommended.  Encourage follow-up with the eye doctor on annual basis for glaucoma evaluation.  Discussed weight and encouraged exercise as tolerated while following a healthy diet.  Colon cancer screening discussed and current status: colonoscopy completed 8/20/14 normal but patient refuses any repeat.  Recommend to get annual mammograms but patient refuses due to difficulties with [facility] billing from last year.      Follow Up:   Next Medicare Wellness visit to be scheduled in 1 year.

## 2023-12-04 NOTE — PATIENT INSTRUCTIONS
Medicare Wellness  Personal Prevention Plan of Service     Date of Office Visit:    Encounter Provider:  Nghia Eaton MD  Place of Service:  [facility]  Patient Name: Lula Decker  :  1955    As part of the Medicare Wellness portion of your visit today, we are providing you with this personalized preventive plan of services (PPPS). This plan is based upon recommendations of the [place] Preventive Services Task Force (USPSTF) and the Advisory Committee on Immunization Practices (ACIP).    This lists the preventive care services that should be considered, and provides dates of when you are due. Items listed as completed are up-to-date and do not require any further intervention.    Health Maintenance   Topic Date Due    TDAP/TD VACCINES (1 - Tdap) Never done    ZOSTER VACCINE (1 of 2) Never done    INFLUENZA VACCINE  2023    COVID-19 Vaccine (5 - - season) 2023    DXA SCAN  2023 (Originally 1955)    LIPID PANEL  2024    COLORECTAL CANCER SCREENING  2024    ANNUAL WELLNESS VISIT  2024    BMI FOLLOWUP  2024    MAMMOGRAM  2024    HEPATITIS C SCREENING  Completed    Pneumococcal Vaccine 65+  Completed    LUNG CANCER SCREENING  Discontinued       Orders Placed This Encounter   Procedures    Fluzone High-Dose 65+yrs ([id number])    COVID-19 F23 (Pfizer) 12yrs+ (COMIRNATY)       Return in about 3 months (around 3/4/2024) for Next scheduled follow up.

## 2023-12-13 DIAGNOSIS — I10 ESSENTIAL HYPERTENSION: ICD-10-CM

## 2023-12-13 RX ORDER — METOPROLOL TARTRATE 100 MG/1
TABLET ORAL
Qty: 180 TABLET | Refills: 3 | Status: SHIPPED | OUTPATIENT
Start: 2023-12-13

## 2023-12-13 NOTE — TELEPHONE ENCOUNTER
Last OV 12/4/23  Next OV 3/4/24  Labs 8/7/23    [facility]

## 2023-12-31 DIAGNOSIS — I10 ESSENTIAL HYPERTENSION: ICD-10-CM

## 2023-12-31 DIAGNOSIS — M15.9 PRIMARY OSTEOARTHRITIS INVOLVING MULTIPLE JOINTS: ICD-10-CM

## 2023-12-31 DIAGNOSIS — G89.4 CHRONIC PAIN SYNDROME: ICD-10-CM

## 2024-01-02 RX ORDER — HYDROCODONE BITARTRATE AND ACETAMINOPHEN 5; 325 MG/1; MG/1
TABLET ORAL
Qty: 90 TABLET | Refills: 0 | Status: SHIPPED | OUTPATIENT
Start: 2024-01-02

## 2024-01-02 RX ORDER — METOPROLOL TARTRATE 100 MG/1
100 TABLET ORAL 2 TIMES DAILY
Qty: 180 TABLET | Refills: 3 | Status: SHIPPED | OUTPATIENT
Start: 2024-01-02

## 2024-01-28 DIAGNOSIS — M15.9 PRIMARY OSTEOARTHRITIS INVOLVING MULTIPLE JOINTS: ICD-10-CM

## 2024-01-28 DIAGNOSIS — G89.4 CHRONIC PAIN SYNDROME: ICD-10-CM

## 2024-01-29 RX ORDER — HYDROCODONE BITARTRATE AND ACETAMINOPHEN 5; 325 MG/1; MG/1
TABLET ORAL
Qty: 90 TABLET | Refills: 0 | Status: SHIPPED | OUTPATIENT
Start: 2024-01-29

## 2024-02-09 DIAGNOSIS — F51.01 PRIMARY INSOMNIA: ICD-10-CM

## 2024-02-09 DIAGNOSIS — K21.9 GASTROESOPHAGEAL REFLUX DISEASE WITHOUT ESOPHAGITIS: ICD-10-CM

## 2024-02-12 RX ORDER — AMITRIPTYLINE HYDROCHLORIDE 100 MG/1
100 TABLET ORAL
Qty: 90 TABLET | Refills: 1 | Status: SHIPPED | OUTPATIENT
Start: 2024-02-12

## 2024-02-12 RX ORDER — PANTOPRAZOLE SODIUM 40 MG/1
40 TABLET, DELAYED RELEASE ORAL DAILY
Qty: 90 TABLET | Refills: 1 | Status: SHIPPED | OUTPATIENT
Start: 2024-02-12

## 2024-02-27 ENCOUNTER — TELEPHONE (OUTPATIENT)
Dept: FAMILY MEDICINE CLINIC | Facility: CLINIC | Age: 69
End: 2024-02-27
Payer: MEDICARE

## 2024-02-27 DIAGNOSIS — M15.9 PRIMARY OSTEOARTHRITIS INVOLVING MULTIPLE JOINTS: ICD-10-CM

## 2024-02-27 DIAGNOSIS — G89.4 CHRONIC PAIN SYNDROME: ICD-10-CM

## 2024-02-27 RX ORDER — HYDROCODONE BITARTRATE AND ACETAMINOPHEN 7.5; 325 MG/1; MG/1
1 TABLET ORAL EVERY 8 HOURS PRN
Qty: 90 TABLET | Refills: 0 | Status: SHIPPED | OUTPATIENT
Start: 2024-02-27

## 2024-02-27 NOTE — TELEPHONE ENCOUNTER
The following patient called and she stated that the [facility] pharmacy should have 7.5mg now. Pt was informed that I would relay the msg.

## 2024-03-04 ENCOUNTER — OFFICE VISIT (OUTPATIENT)
Dept: FAMILY MEDICINE CLINIC | Facility: CLINIC | Age: 69
End: 2024-03-04
Payer: MEDICARE

## 2024-03-04 VITALS
BODY MASS INDEX: 26.16 KG/M2 | HEART RATE: 82 BPM | DIASTOLIC BLOOD PRESSURE: 82 MMHG | SYSTOLIC BLOOD PRESSURE: 132 MMHG | TEMPERATURE: 97.5 F | HEIGHT: 66 IN | WEIGHT: 162.8 LBS | OXYGEN SATURATION: 96 %

## 2024-03-04 DIAGNOSIS — M15.9 PRIMARY OSTEOARTHRITIS INVOLVING MULTIPLE JOINTS: ICD-10-CM

## 2024-03-04 DIAGNOSIS — M54.2 CERVICAL PAIN (NECK): ICD-10-CM

## 2024-03-04 DIAGNOSIS — G89.4 CHRONIC PAIN SYNDROME: ICD-10-CM

## 2024-03-04 DIAGNOSIS — E78.5 HYPERLIPIDEMIA, UNSPECIFIED HYPERLIPIDEMIA TYPE: ICD-10-CM

## 2024-03-04 DIAGNOSIS — I10 PRIMARY HYPERTENSION: Primary | ICD-10-CM

## 2024-03-04 NOTE — PROGRESS NOTES
Subjective   Lula Decker is a 68 y.o. female.     Chief Complaint   Patient presents with    Hypertension    Hyperlipidemia    Med Refill     Hydrocodone    Pain     She said she has been having pain in the base of her skull and in the top of her shoulder blades.        Hypertension  Pertinent negatives include no chest pain, palpitations or shortness of breath.   Hyperlipidemia  Pertinent negatives include no chest pain, myalgias or shortness of breath.   Pain  Pertinent negatives include no abdominal pain, arthralgias, chest pain, coughing, fatigue, fever, myalgias, nausea, numbness, rash, vomiting or weakness.      Answers submitted by the patient for this visit:  Other (Submitted on 3/2/2024)  Please describe your symptoms.: 3 mo checkup an shoulder blade pain, base of skull pain, continues runny nose.  Have you had these symptoms before?: Yes  How long have you been having these symptoms?: Greater than 2 weeks  Primary Reason for Visit (Submitted on 3/2/2024)  What is the primary reason for your visit?: Other    Patient is having some pain in the back of her head and the base of her skull and top of her shoulder blades.  It has been present for several weeks at a time then comes and goes.  No falls or injuries.    History of hypertension.  Currently, has been feeling well and asymptomatic without any headaches,vision changes, cough, chest pain, shortness of breath, swelling, focal neurologic deficit, memory loss or syncope.  Has been taking the medications regularly and adherent with the regimen of metoprolol tartrate 100 mg twice a day.  Denies medication side effects and no significant interval events.      Follow-up for cholesterol.  Currently, has been feeling well without any myalgias, muscle aches, weakness, numbness, chest pain, short of breath or other issues.  Currently, is adherent with medication regimen of crestor 10 mg and denies medication side effects. Is due for lab follow-up since was changed  to crestor in Jan 2022 due to insurance.  Patient is adamant will not take any medications for the cholesterol and last labs were done on 8/7/2023 , HDL 69, triglycerides 277, and total cholesterol 331 with normal CMP.     Follow-up with GERD and intermittent nausea lasting 5-10 minutes then self resolves.  This has been a chronic issue for a long time and unchanged.  She continues with the pantoprazole 40 mg daily.     Patient with COPD and on oxygen nasal canula and using the inhaled medications is stable and continues to see pulmonology.  Now on duo-neb, pulmicort and brovana.  Having progressive SOA and wheezing.     Continues to have chronic pain in the back, shoulders and knee.  Using the hydrocodone as needed and averages 2-3 times per day.  Denies side effects or issues with the problems or medications.  Having right elbow pain and swelling with septic bursitis and cellulitis.  Seeing Dr Sekou burton.  Continued knee pain that intermittently flares and is severe in the right.  Has seen ortho and only options are injections or surgery but patient continues to refuse these options at this time.  Last script filled 12/1/2023 for hydrocodone 5-325 due to availability issues.      The following portions of the patient's history were reviewed and updated as appropriate: allergies, current medications, past family history, past medical history, past social history, past surgical history and problem list.    Depression Screen:      12/4/2023     1:04 PM   PHQ-2/PHQ-9 Depression Screening   Little Interest or Pleasure in Doing Things 0-->not at all   Feeling Down, Depressed or Hopeless 0-->not at all   PHQ-9: Brief Depression Severity Measure Score 0       Past Medical History:   Diagnosis Date    Abnormal findings on diagnostic imaging of abdomen     Alopecia areata     Ankle sprain right ankle    Arthritis of neck cervical ribs    Asthma     Bleeding external hemorrhoids     BMI 25.0-25.9,adult     Calf  cramp     Carpal tunnel syndrome     Cervical disc disorder cervical ribs 3surgries    Chest pain     Chest wall pain     Constipation     COPD (chronic obstructive pulmonary disease)     COPD with acute exacerbation     Discoloration of skin of foot     Dyslipidemia     Dysuria     Eczema     Encounter for immunization     Esophageal reflux     Fatty liver     Fibromyalgia, primary     GERD (gastroesophageal reflux disease)     Headache     Hives of unknown origin     Hyperactivity of bladder     Hypertension     IBS (irritable bowel syndrome)     Incontinence of urine     Insomnia     Menopausal symptom     Migraine     MRSA exposure     Muscle spasm     Nocturia     OA (osteoarthritis)     Obstructive chronic bronchitis with acute exacerbation     Oral thrush     Osteoarthritis of left knee     Osteoporosis     PAD (peripheral artery disease)     Periarthritis of shoulder     Sinusitis, acute     Synovial cyst of popliteal space     Thoracic disc disorder     Tremor     Tubular adenoma of colon     Urinary frequency     Urinary urgency     Urinary, incontinence, stress female     UTI (urinary tract infection)        Past Surgical History:   Procedure Laterality Date    BREAST LUMPECTOMY      BREAST SURGERY      COLONOSCOPY      EYE SURGERY  catract surgery    HAND SURGERY  carpel tunnel    HYSTERECTOMY      Not due to cancer    INCONTINENCE SURGERY      NECK SURGERY      NEUROPLASTY      Median nerve at carpal tunnel.    OOPHORECTOMY      OTHER SURGICAL HISTORY  08/20/2014    Esophadogastroduodenoscopy - Gastritis     TONSILLECTOMY      TUBAL ABDOMINAL LIGATION      WRIST SURGERY         Family History   Problem Relation Age of Onset    Heart failure Father     Heart disease Father        Social History     Socioeconomic History    Marital status:    Tobacco Use    Smoking status: Every Day     Current packs/day: 0.50     Average packs/day: 0.5 packs/day for 50.0 years (25.0 ttl pk-yrs)     Types:  Cigarettes    Smokeless tobacco: Never   Vaping Use    Vaping status: Never Used   Substance and Sexual Activity    Alcohol use: Yes     Comment: occasional beer with pizza    Drug use: No    Sexual activity: Defer       Current Outpatient Medications   Medication Sig Dispense Refill    albuterol sulfate  (90 Base) MCG/ACT inhaler INHALE 2 PUFFS BY MOUTH EVERY 4 HOURS AS NEEDED 8.5 g 6    amitriptyline (ELAVIL) 100 MG tablet TAKE ONE TABLET BY MOUTH EVERY NIGHT AT BEDTIME 90 tablet 1    arformoterol (BROVANA) 15 MCG/2ML nebulizer solution Take  by nebulization 2 (Two) Times a Day.      budesonide (PULMICORT) 0.5 MG/2ML nebulizer solution Take 2 mL by nebulization Daily.      CRANBERRY PO Take 1 tablet by mouth Daily.      doxylamine (UNISOM) 25 MG tablet Take 1 tablet by mouth At Night As Needed.      HYDROcodone-acetaminophen (NORCO) 7.5-325 MG per tablet Take 1 tablet by mouth Every 8 (Eight) Hours As Needed for Moderate Pain. 90 tablet 0    hydrocortisone 2.5 % cream Apply 2.5 application topically to the appropriate area as directed 3 (Three) Times a Day.      ipratropium-albuterol (DUO-NEB) 0.5-2.5 mg/3 ml nebulizer Take 3 mL by nebulization 4 (Four) Times a Day. 360 mL 3    metoprolol tartrate (LOPRESSOR) 100 MG tablet Take 1 tablet by mouth 2 (Two) Times a Day. 180 tablet 3    pantoprazole (PROTONIX) 40 MG EC tablet TAKE 1 TABLET BY MOUTH DAILY 90 tablet 1    triamcinolone (KENALOG) 0.1 % cream Apply  topically to the appropriate area as directed 2 (Two) Times a Day. 45 g 0    vitamin B-12 (CYANOCOBALAMIN) 1000 MCG tablet Take 1 tablet by mouth Daily.       No current facility-administered medications for this visit.       Review of Systems   Constitutional:  Negative for activity change, appetite change, fatigue, fever, unexpected weight gain and unexpected weight loss.   HENT:  Negative for nosebleeds, rhinorrhea, trouble swallowing and voice change.    Eyes:  Negative for visual disturbance.  "  Respiratory:  Negative for cough, chest tightness, shortness of breath and wheezing.    Cardiovascular:  Negative for chest pain, palpitations and leg swelling.   Gastrointestinal:  Negative for abdominal pain, blood in stool, constipation, diarrhea, nausea, vomiting, GERD and indigestion.   Genitourinary:  Negative for dysuria, frequency and hematuria.   Musculoskeletal:  Negative for arthralgias, back pain and myalgias.   Skin:  Negative for rash and wound.   Neurological:  Negative for dizziness, tremors, weakness, light-headedness, numbness, headache and memory problem.   Hematological:  Negative for adenopathy. Does not bruise/bleed easily.   Psychiatric/Behavioral:  Negative for sleep disturbance and depressed mood. The patient is not nervous/anxious.        Objective   /82 (BP Location: Left arm, Patient Position: Sitting, Cuff Size: Adult)   Pulse 82   Temp 97.5 °F (36.4 °C) (Temporal)   Ht 167.6 cm (65.98\")   Wt 73.8 kg (162 lb 12.8 oz)   SpO2 96% Comment: On 2 liters  BMI 26.29 kg/m²     Physical Exam  Vitals and nursing note reviewed.   Constitutional:       General: She is not in acute distress.     Appearance: She is well-developed. She is not diaphoretic.   HENT:      Head: Normocephalic and atraumatic.      Right Ear: External ear normal.      Left Ear: External ear normal.      Nose: Nose normal.   Eyes:      Conjunctiva/sclera: Conjunctivae normal.      Pupils: Pupils are equal, round, and reactive to light.   Neck:      Thyroid: No thyromegaly.      Trachea: No tracheal deviation.   Cardiovascular:      Rate and Rhythm: Normal rate and regular rhythm.      Heart sounds: Normal heart sounds. No murmur heard.     No friction rub. No gallop.   Pulmonary:      Effort: Pulmonary effort is normal. No respiratory distress.      Breath sounds: Normal breath sounds.      Comments: On oxygen NC  Abdominal:      General: Bowel sounds are normal.      Palpations: Abdomen is soft. There is no " mass.      Tenderness: There is no abdominal tenderness. There is no guarding.   Musculoskeletal:         General: Normal range of motion.      Cervical back: Normal range of motion and neck supple.   Lymphadenopathy:      Cervical: No cervical adenopathy.   Skin:     General: Skin is warm and dry.      Capillary Refill: Capillary refill takes less than 2 seconds.      Findings: No rash.   Neurological:      Mental Status: She is alert and oriented to person, place, and time.      Motor: No abnormal muscle tone.      Deep Tendon Reflexes: Reflexes normal.   Psychiatric:         Behavior: Behavior normal.         Thought Content: Thought content normal.         Judgment: Judgment normal.       No results found for this or any previous visit (from the past 2016 hour(s)).  Assessment & Plan   Diagnoses and all orders for this visit:    1. Primary hypertension (Primary)    2. Hyperlipidemia, unspecified hyperlipidemia type    3. Cervical pain (neck)    4. Chronic pain syndrome  -     Compliance Drug Analysis, Ur - Urine, Clean Catch    5. Primary osteoarthritis involving multiple joints  -     Compliance Drug Analysis, Ur - Urine, Clean Catch    Hypertension is currently well-controlled.  Continue current medication unchanged.  We discussed the cholesterol and her neck pain which appears to be more musculoskeletal.  Heat to the area and slow stretching exercises.  Will perform urine drug screen for the chronic pain and the use of Hydrocodone.  ROGER run and reviewed.  Risks of the medication include but are not limited to fatigue, somnolence, increased risk of falls, constipation, allergic reaction, dependence, and addiction.  Patient will follow-up with us every 3 months currently for monitoring and medications.             COVID-19 Precautions - Patient was compliant in wearing a mask. When I saw the patient, I used appropriate personal protective equipment (PPE) including mask and eye shield (standard procedure).   Additionally, I used gown and gloves if indicated.  Hand hygiene was completed before and after seeing the patient.  Dictated utilizing Dragon Dictation

## 2024-03-12 LAB — DRUGS UR: NORMAL

## 2024-03-24 DIAGNOSIS — M15.9 PRIMARY OSTEOARTHRITIS INVOLVING MULTIPLE JOINTS: ICD-10-CM

## 2024-03-24 DIAGNOSIS — G89.4 CHRONIC PAIN SYNDROME: ICD-10-CM

## 2024-03-25 RX ORDER — HYDROCODONE BITARTRATE AND ACETAMINOPHEN 7.5; 325 MG/1; MG/1
1 TABLET ORAL EVERY 8 HOURS PRN
Qty: 90 TABLET | Refills: 0 | Status: SHIPPED | OUTPATIENT
Start: 2024-03-25

## 2024-04-22 DIAGNOSIS — G89.4 CHRONIC PAIN SYNDROME: ICD-10-CM

## 2024-04-22 DIAGNOSIS — M15.9 PRIMARY OSTEOARTHRITIS INVOLVING MULTIPLE JOINTS: ICD-10-CM

## 2024-04-22 RX ORDER — HYDROCODONE BITARTRATE AND ACETAMINOPHEN 7.5; 325 MG/1; MG/1
1 TABLET ORAL EVERY 8 HOURS PRN
Qty: 90 TABLET | Refills: 0 | Status: SHIPPED | OUTPATIENT
Start: 2024-04-22

## 2024-05-19 DIAGNOSIS — M15.9 PRIMARY OSTEOARTHRITIS INVOLVING MULTIPLE JOINTS: ICD-10-CM

## 2024-05-19 DIAGNOSIS — G89.4 CHRONIC PAIN SYNDROME: ICD-10-CM

## 2024-05-20 RX ORDER — HYDROCODONE BITARTRATE AND ACETAMINOPHEN 7.5; 325 MG/1; MG/1
1 TABLET ORAL EVERY 8 HOURS PRN
Qty: 90 TABLET | Refills: 0 | Status: SHIPPED | OUTPATIENT
Start: 2024-05-20

## 2024-06-03 ENCOUNTER — OFFICE VISIT (OUTPATIENT)
Dept: FAMILY MEDICINE CLINIC | Facility: CLINIC | Age: 69
End: 2024-06-03
Payer: MEDICARE

## 2024-06-03 VITALS
SYSTOLIC BLOOD PRESSURE: 136 MMHG | DIASTOLIC BLOOD PRESSURE: 86 MMHG | WEIGHT: 154.2 LBS | BODY MASS INDEX: 24.78 KG/M2 | HEIGHT: 66 IN | TEMPERATURE: 97.3 F | OXYGEN SATURATION: 96 % | HEART RATE: 80 BPM

## 2024-06-03 DIAGNOSIS — R53.82 CHRONIC FATIGUE: ICD-10-CM

## 2024-06-03 DIAGNOSIS — Z99.81 SUPPLEMENTAL OXYGEN DEPENDENT: ICD-10-CM

## 2024-06-03 DIAGNOSIS — I10 PRIMARY HYPERTENSION: Primary | ICD-10-CM

## 2024-06-03 DIAGNOSIS — Z66 DNR (DO NOT RESUSCITATE): ICD-10-CM

## 2024-06-03 DIAGNOSIS — H53.9 VISUAL DISTURBANCES: ICD-10-CM

## 2024-06-03 DIAGNOSIS — J44.9 CHRONIC OBSTRUCTIVE PULMONARY DISEASE, UNSPECIFIED COPD TYPE: ICD-10-CM

## 2024-06-03 DIAGNOSIS — R42 ORTHOSTATIC DIZZINESS: ICD-10-CM

## 2024-06-03 PROCEDURE — 99214 OFFICE O/P EST MOD 30 MIN: CPT | Performed by: INTERNAL MEDICINE

## 2024-06-03 PROCEDURE — 3075F SYST BP GE 130 - 139MM HG: CPT | Performed by: INTERNAL MEDICINE

## 2024-06-03 PROCEDURE — G2211 COMPLEX E/M VISIT ADD ON: HCPCS | Performed by: INTERNAL MEDICINE

## 2024-06-03 PROCEDURE — 1126F AMNT PAIN NOTED NONE PRSNT: CPT | Performed by: INTERNAL MEDICINE

## 2024-06-03 PROCEDURE — 1160F RVW MEDS BY RX/DR IN RCRD: CPT | Performed by: INTERNAL MEDICINE

## 2024-06-03 PROCEDURE — 1159F MED LIST DOCD IN RCRD: CPT | Performed by: INTERNAL MEDICINE

## 2024-06-03 PROCEDURE — 3079F DIAST BP 80-89 MM HG: CPT | Performed by: INTERNAL MEDICINE

## 2024-06-03 NOTE — PROGRESS NOTES
Subjective   Lula Decker is a 68 y.o. female.     Chief Complaint   Patient presents with    Med Refill    Blurred Vision     She is having problems with her vision going in and out     Dizziness     She is having problems with feeling lightheaded. She said she feels it when she gets up and down and if she goes into her coughing and sneezing fits    Fatigue     She is having problems with extra fatigue       History of Present Illness     Here for medicine refill but also has been having some difficulty with her vision becoming blurred off and on, most of the time both eyes but sometimes in just one.  She is also been having some dizziness feeling lightheaded states when she gets up and gets down it happens but also when she is having coughing or sneezing fits.  She has had excessive fatigue beyond her baseline.  It is noted patient has lost 8 pounds in the last 3 months    History of hypertension.  Currently, has been feeling well and asymptomatic without any headaches, cough, chest pain, shortness of breath, swelling, focal neurologic deficit, memory loss or syncope.  Has been taking the medications regularly and adherent with the regimen of metoprolol tartrate 100 mg twice a day.  Denies medication side effects and no significant interval events.     History of high cholesterol.  Currently, has been feeling well without any myalgias, muscle aches, weakness, numbness, chest pain, short of breath or other issues.  Currently, is has not been taking any medications for the cholesterol for the last 18 months.  Patient is adamant will not take any medications for the cholesterol and last labs were done on 8/7/2023 , HDL 69, triglycerides 277, and total cholesterol 331 with normal CMP.     History of GERD and intermittent nausea lasting 5-10 minutes then self resolves.  This has been a chronic issue for a long time and unchanged.  She continues with the pantoprazole 40 mg daily.     Patient with COPD and on  oxygen nasal canula and using the inhaled medications is stable and continues to see pulmonology.  Now on duo-neb, pulmicort and brovana.  Having progressive SOA and wheezing.     Continues to have chronic pain in the back, shoulders and knee.  Using the hydrocodone as needed and averages 2-3 times per day.  Denies side effects or issues with the problems or medications.  Having right elbow pain and swelling with septic bursitis and cellulitis.  Seeing Dr Sekou burton.  Continued knee pain that intermittently flares and is severe in the right.  Has seen ortho and only options are injections or surgery but patient continues to refuse these options at this time.  Last script filled 5/22/2024 for hydrocodone 7.5-325 #90 tabs.      The following portions of the patient's history were reviewed and updated as appropriate: allergies, current medications, past family history, past medical history, past social history, past surgical history and problem list.    Depression Screen:      6/3/2024     1:35 PM   PHQ-2/PHQ-9 Depression Screening   Little Interest or Pleasure in Doing Things 0-->not at all   Feeling Down, Depressed or Hopeless 0-->not at all   PHQ-9: Brief Depression Severity Measure Score 0       Past Medical History:   Diagnosis Date    Abnormal findings on diagnostic imaging of abdomen     Alopecia areata     Ankle sprain right ankle    Arthritis of neck cervical ribs    Asthma     Bleeding external hemorrhoids     BMI 25.0-25.9,adult     Calf cramp     Carpal tunnel syndrome     Cervical disc disorder cervical ribs 3surgries    Chest pain     Chest wall pain     Constipation     COPD (chronic obstructive pulmonary disease)     COPD with acute exacerbation     Discoloration of skin of foot     Dyslipidemia     Dysuria     Eczema     Encounter for immunization     Esophageal reflux     Fatty liver     Fibromyalgia, primary     GERD (gastroesophageal reflux disease)     Headache     Hives of unknown origin      Hyperactivity of bladder     Hypertension     IBS (irritable bowel syndrome)     Incontinence of urine     Insomnia     Menopausal symptom     Migraine     MRSA exposure     Muscle spasm     Nocturia     OA (osteoarthritis)     Obstructive chronic bronchitis with acute exacerbation     Oral thrush     Osteoarthritis of left knee     Osteoporosis     PAD (peripheral artery disease)     Periarthritis of shoulder     Sinusitis, acute     Synovial cyst of popliteal space     Thoracic disc disorder     Tremor     Tubular adenoma of colon     Urinary frequency     Urinary urgency     Urinary, incontinence, stress female     UTI (urinary tract infection)        Past Surgical History:   Procedure Laterality Date    BREAST LUMPECTOMY      BREAST SURGERY      COLONOSCOPY      EYE SURGERY  catract surgery    HAND SURGERY  carpel tunnel    HYSTERECTOMY      Not due to cancer    INCONTINENCE SURGERY      NECK SURGERY      NEUROPLASTY      Median nerve at carpal tunnel.    OOPHORECTOMY      OTHER SURGICAL HISTORY  08/20/2014    Esophadogastroduodenoscopy - Gastritis     TONSILLECTOMY      TUBAL ABDOMINAL LIGATION      WRIST SURGERY         Family History   Problem Relation Age of Onset    Heart failure Father     Heart disease Father        Social History     Socioeconomic History    Marital status:    Tobacco Use    Smoking status: Every Day     Current packs/day: 0.50     Average packs/day: 0.5 packs/day for 50.0 years (25.0 ttl pk-yrs)     Types: Cigarettes    Smokeless tobacco: Never   Vaping Use    Vaping status: Never Used   Substance and Sexual Activity    Alcohol use: Yes     Comment: occasional beer with pizza    Drug use: No    Sexual activity: Defer       Current Outpatient Medications   Medication Sig Dispense Refill    albuterol sulfate  (90 Base) MCG/ACT inhaler INHALE 2 PUFFS BY MOUTH EVERY 4 HOURS AS NEEDED 8.5 g 6    amitriptyline (ELAVIL) 100 MG tablet TAKE ONE TABLET BY MOUTH EVERY NIGHT AT  BEDTIME 90 tablet 1    arformoterol (BROVANA) 15 MCG/2ML nebulizer solution Take  by nebulization 2 (Two) Times a Day.      budesonide (PULMICORT) 0.5 MG/2ML nebulizer solution Take 2 mL by nebulization Daily.      CRANBERRY PO Take 1 tablet by mouth Daily.      doxylamine (UNISOM) 25 MG tablet Take 1 tablet by mouth At Night As Needed.      HYDROcodone-acetaminophen (NORCO) 7.5-325 MG per tablet Take 1 tablet by mouth Every 8 (Eight) Hours As Needed for Moderate Pain. 90 tablet 0    hydrocortisone 2.5 % cream Apply 2.5 application topically to the appropriate area as directed 3 (Three) Times a Day.      ipratropium-albuterol (DUO-NEB) 0.5-2.5 mg/3 ml nebulizer Take 3 mL by nebulization 4 (Four) Times a Day. 360 mL 3    metoprolol tartrate (LOPRESSOR) 100 MG tablet Take 1 tablet by mouth 2 (Two) Times a Day. 180 tablet 3    pantoprazole (PROTONIX) 40 MG EC tablet TAKE 1 TABLET BY MOUTH DAILY 90 tablet 1    triamcinolone (KENALOG) 0.1 % cream Apply  topically to the appropriate area as directed 2 (Two) Times a Day. 45 g 0    vitamin B-12 (CYANOCOBALAMIN) 1000 MCG tablet Take 1 tablet by mouth Daily.       No current facility-administered medications for this visit.     Review of Systems   Constitutional:  Positive for fatigue. Negative for activity change, appetite change, fever, unexpected weight gain and unexpected weight loss.   HENT:  Negative for nosebleeds, rhinorrhea, trouble swallowing and voice change.    Eyes:  Positive for blurred vision. Negative for visual disturbance.   Respiratory:  Positive for shortness of breath. Negative for cough, chest tightness and wheezing.    Cardiovascular:  Negative for chest pain, palpitations and leg swelling.   Gastrointestinal:  Negative for abdominal pain, blood in stool, constipation, diarrhea, nausea, vomiting, GERD and indigestion.   Genitourinary:  Negative for dysuria, frequency and hematuria.   Musculoskeletal:  Negative for arthralgias, back pain and myalgias.  "  Skin:  Negative for rash and wound.   Neurological:  Positive for dizziness and light-headedness. Negative for tremors, weakness, numbness, headache and memory problem.   Hematological:  Negative for adenopathy. Does not bruise/bleed easily.   Psychiatric/Behavioral:  Negative for sleep disturbance and depressed mood. The patient is not nervous/anxious.      Objective   /86 (BP Location: Left arm, Patient Position: Sitting, Cuff Size: Adult)   Pulse 80   Temp 97.3 °F (36.3 °C) (Temporal)   Ht 167.6 cm (65.98\")   Wt 69.9 kg (154 lb 3.2 oz)   SpO2 96%   BMI 24.90 kg/m²     Physical Exam  Vitals and nursing note reviewed.   Constitutional:       General: She is not in acute distress.     Appearance: She is well-developed. She is not diaphoretic.   HENT:      Head: Normocephalic and atraumatic.      Right Ear: External ear normal.      Left Ear: External ear normal.      Nose: Nose normal.   Eyes:      Conjunctiva/sclera: Conjunctivae normal.      Pupils: Pupils are equal, round, and reactive to light.   Neck:      Thyroid: No thyromegaly.      Trachea: No tracheal deviation.   Cardiovascular:      Rate and Rhythm: Normal rate and regular rhythm.      Heart sounds: Normal heart sounds. No murmur heard.     No friction rub. No gallop.   Pulmonary:      Effort: Pulmonary effort is normal. No respiratory distress.      Breath sounds: Normal breath sounds.   Abdominal:      General: Bowel sounds are normal.      Palpations: Abdomen is soft. There is no mass.      Tenderness: There is no abdominal tenderness. There is no guarding.   Musculoskeletal:         General: Normal range of motion.      Cervical back: Normal range of motion and neck supple.   Lymphadenopathy:      Cervical: No cervical adenopathy.   Skin:     General: Skin is warm and dry.      Capillary Refill: Capillary refill takes less than 2 seconds.      Findings: No rash.   Neurological:      Mental Status: She is alert and oriented to person, " place, and time.      Motor: No abnormal muscle tone.      Deep Tendon Reflexes: Reflexes normal.   Psychiatric:         Behavior: Behavior normal.         Thought Content: Thought content normal.         Judgment: Judgment normal.     Advance Care Planning   ACP discussion was held with the patient during this visit. Patient has an advance directive in EMR which is still valid.         No results found for this or any previous visit (from the past 2016 hour(s)).  Assessment & Plan   Diagnoses and all orders for this visit:    1. Primary hypertension (Primary)  -     CBC & Differential  -     Comprehensive Metabolic Panel    2. Visual disturbances  -     CBC & Differential  -     TSH Rfx On Abnormal To Free T4    3. Chronic fatigue  -     CBC & Differential  -     TSH Rfx On Abnormal To Free T4  -     Vitamin B12 & Folate    4. Orthostatic dizziness  -     CBC & Differential  -     TSH Rfx On Abnormal To Free T4    5. Chronic obstructive pulmonary disease, unspecified COPD type    6. Supplemental oxygen dependent    7. DNR (do not resuscitate)    I discussed with the patient concerning her visual disturbances QT orthostasis which may all be related to her oxygen levels with her COPD.  She is DNR and this was reviewed and patient is understanding.  Form in chart.  Must rule out anemia, thyroid deficiency, B12 folic acid disorders, blood sugars etc.  Labs as noted above.  Blood pressure is borderline elevated but I am reluctant to increase antihypertensives with her orthostasis symptoms.           COVID-19 Precautions - Patient was compliant in wearing a mask. When I saw the patient, I used appropriate personal protective equipment (PPE) including mask and eye shield (standard procedure).  Additionally, I used gown and gloves if indicated.  Hand hygiene was completed before and after seeing the patient.  Dictated utilizing Dragon Dictation

## 2024-06-04 LAB
ALBUMIN SERPL-MCNC: 4.6 G/DL (ref 3.9–4.9)
ALBUMIN/GLOB SERPL: 1.8 {RATIO} (ref 1.2–2.2)
ALP SERPL-CCNC: 77 IU/L (ref 44–121)
ALT SERPL-CCNC: 12 IU/L (ref 0–32)
AST SERPL-CCNC: 19 IU/L (ref 0–40)
BASOPHILS # BLD AUTO: 0.1 X10E3/UL (ref 0–0.2)
BASOPHILS NFR BLD AUTO: 1 %
BILIRUB SERPL-MCNC: 0.3 MG/DL (ref 0–1.2)
BUN SERPL-MCNC: 7 MG/DL (ref 8–27)
BUN/CREAT SERPL: 10 (ref 12–28)
CALCIUM SERPL-MCNC: 9.3 MG/DL (ref 8.7–10.3)
CHLORIDE SERPL-SCNC: 97 MMOL/L (ref 96–106)
CO2 SERPL-SCNC: 26 MMOL/L (ref 20–29)
CREAT SERPL-MCNC: 0.71 MG/DL (ref 0.57–1)
EGFRCR SERPLBLD CKD-EPI 2021: 93 ML/MIN/1.73
EOSINOPHIL # BLD AUTO: 0.2 X10E3/UL (ref 0–0.4)
EOSINOPHIL NFR BLD AUTO: 2 %
ERYTHROCYTE [DISTWIDTH] IN BLOOD BY AUTOMATED COUNT: 12.5 % (ref 11.7–15.4)
FOLATE SERPL-MCNC: 8.3 NG/ML
GLOBULIN SER CALC-MCNC: 2.6 G/DL (ref 1.5–4.5)
GLUCOSE SERPL-MCNC: 79 MG/DL (ref 70–99)
HCT VFR BLD AUTO: 40.2 % (ref 34–46.6)
HGB BLD-MCNC: 13 G/DL (ref 11.1–15.9)
IMM GRANULOCYTES # BLD AUTO: 0 X10E3/UL (ref 0–0.1)
IMM GRANULOCYTES NFR BLD AUTO: 0 %
LYMPHOCYTES # BLD AUTO: 3.2 X10E3/UL (ref 0.7–3.1)
LYMPHOCYTES NFR BLD AUTO: 39 %
MCH RBC QN AUTO: 30.3 PG (ref 26.6–33)
MCHC RBC AUTO-ENTMCNC: 32.3 G/DL (ref 31.5–35.7)
MCV RBC AUTO: 94 FL (ref 79–97)
MONOCYTES # BLD AUTO: 0.9 X10E3/UL (ref 0.1–0.9)
MONOCYTES NFR BLD AUTO: 11 %
NEUTROPHILS # BLD AUTO: 3.8 X10E3/UL (ref 1.4–7)
NEUTROPHILS NFR BLD AUTO: 47 %
PLATELET # BLD AUTO: 309 X10E3/UL (ref 150–450)
POTASSIUM SERPL-SCNC: 4.6 MMOL/L (ref 3.5–5.2)
PROT SERPL-MCNC: 7.2 G/DL (ref 6–8.5)
RBC # BLD AUTO: 4.29 X10E6/UL (ref 3.77–5.28)
SODIUM SERPL-SCNC: 137 MMOL/L (ref 134–144)
TSH SERPL DL<=0.005 MIU/L-ACNC: 1.62 UIU/ML (ref 0.45–4.5)
VIT B12 SERPL-MCNC: 773 PG/ML (ref 232–1245)
WBC # BLD AUTO: 8.2 X10E3/UL (ref 3.4–10.8)

## 2024-06-18 DIAGNOSIS — M15.9 PRIMARY OSTEOARTHRITIS INVOLVING MULTIPLE JOINTS: ICD-10-CM

## 2024-06-18 DIAGNOSIS — G89.4 CHRONIC PAIN SYNDROME: ICD-10-CM

## 2024-06-19 RX ORDER — HYDROCODONE BITARTRATE AND ACETAMINOPHEN 7.5; 325 MG/1; MG/1
1 TABLET ORAL EVERY 8 HOURS PRN
Qty: 90 TABLET | Refills: 0 | Status: SHIPPED | OUTPATIENT
Start: 2024-06-19

## 2024-07-21 DIAGNOSIS — M15.9 PRIMARY OSTEOARTHRITIS INVOLVING MULTIPLE JOINTS: ICD-10-CM

## 2024-07-21 DIAGNOSIS — G89.4 CHRONIC PAIN SYNDROME: ICD-10-CM

## 2024-07-23 RX ORDER — HYDROCODONE BITARTRATE AND ACETAMINOPHEN 7.5; 325 MG/1; MG/1
1 TABLET ORAL EVERY 8 HOURS PRN
Qty: 90 TABLET | Refills: 0 | Status: SHIPPED | OUTPATIENT
Start: 2024-07-23

## 2024-08-11 DIAGNOSIS — K21.9 GASTROESOPHAGEAL REFLUX DISEASE WITHOUT ESOPHAGITIS: ICD-10-CM

## 2024-08-11 DIAGNOSIS — F51.01 PRIMARY INSOMNIA: ICD-10-CM

## 2024-08-12 RX ORDER — PANTOPRAZOLE SODIUM 40 MG/1
40 TABLET, DELAYED RELEASE ORAL DAILY
Qty: 90 TABLET | Refills: 1 | Status: SHIPPED | OUTPATIENT
Start: 2024-08-12

## 2024-08-12 RX ORDER — AMITRIPTYLINE HYDROCHLORIDE 100 MG/1
100 TABLET ORAL
Qty: 90 TABLET | Refills: 1 | Status: SHIPPED | OUTPATIENT
Start: 2024-08-12

## 2024-08-22 DIAGNOSIS — M15.9 PRIMARY OSTEOARTHRITIS INVOLVING MULTIPLE JOINTS: ICD-10-CM

## 2024-08-22 DIAGNOSIS — G89.4 CHRONIC PAIN SYNDROME: ICD-10-CM

## 2024-08-22 RX ORDER — HYDROCODONE BITARTRATE AND ACETAMINOPHEN 7.5; 325 MG/1; MG/1
1 TABLET ORAL EVERY 8 HOURS PRN
Qty: 90 TABLET | Refills: 0 | Status: SHIPPED | OUTPATIENT
Start: 2024-08-22

## 2024-08-22 NOTE — TELEPHONE ENCOUNTER
Last visit  06/03/2024  Upcoming visit  09/05/2024  Last refill   07/23/2024  Protocols:     Opioid Analgesics Medication Protocol [id number]/2024 12:20 PM    Controlled Substance Agreement is on file    No active pregnancy on record    No positive pregnancy test in the past 12 months    Patient has  had a urine drug screen in the past 12 months    Appt with prescriber in the past 90 days    Medication not refilled in past 28 days    No benzodiazepines active on med list   Controlled Substance Med Protocol Failed   Protocol Details Controlled Substance Agreement is on file    Medication not refilled in past 28 days    No Active Pregnancy on Record    No Positive Pregnancy Test in Past 12 Months    Urine Toxicology Performed in Last 12 Months    Recent Appt with me in Past 3 Months    No Benzodiazepines on Active Med List

## 2024-09-05 ENCOUNTER — OFFICE VISIT (OUTPATIENT)
Dept: FAMILY MEDICINE CLINIC | Facility: CLINIC | Age: 69
End: 2024-09-05
Payer: MEDICARE

## 2024-09-05 VITALS
DIASTOLIC BLOOD PRESSURE: 70 MMHG | HEART RATE: 89 BPM | SYSTOLIC BLOOD PRESSURE: 120 MMHG | TEMPERATURE: 98.7 F | BODY MASS INDEX: 24.91 KG/M2 | HEIGHT: 66 IN | WEIGHT: 155 LBS | OXYGEN SATURATION: 97 %

## 2024-09-05 DIAGNOSIS — G89.4 CHRONIC PAIN SYNDROME: ICD-10-CM

## 2024-09-05 DIAGNOSIS — M15.9 PRIMARY OSTEOARTHRITIS INVOLVING MULTIPLE JOINTS: ICD-10-CM

## 2024-09-05 DIAGNOSIS — I10 PRIMARY HYPERTENSION: Primary | ICD-10-CM

## 2024-09-05 NOTE — PROGRESS NOTES
Subjective   Lula Decker is a 69 y.o. female.     Chief Complaint   Patient presents with    Hypertension       History of Present Illness   History of hypertension.  Currently, has been feeling well and asymptomatic without any headaches, cough, chest pain, shortness of breath, swelling, focal neurologic deficit, memory loss or syncope.  Has been taking the medications regularly and adherent with the regimen of metoprolol tartrate 100 mg twice a day.  Denies medication side effects and no significant interval events.      History of high cholesterol.  Currently, has been feeling well without any myalgias, muscle aches, weakness, numbness, chest pain, short of breath or other issues.  Currently, is has not been taking any medications for the cholesterol for the last 18 months.  Patient is adamant will not take any medications for the cholesterol and last labs were done on 8/7/2023 , HDL 69, triglycerides 277, and total cholesterol 331 with normal CMP.     History of GERD and intermittent nausea lasting 5-10 minutes then self resolves.  This has been a chronic issue for a long time and unchanged.  She continues with the pantoprazole 40 mg daily.     Patient with COPD and on oxygen nasal canula and using the inhaled medications is stable and continues to see pulmonology.  Now on duo-neb, pulmicort and brovana.  Having progressive SOA and wheezing.     Continues to have chronic pain in the back, shoulders and knee.  Using the hydrocodone as needed and averages 2-3 times per day.  Denies side effects or issues with the problems or medications.  Having right elbow pain and swelling with septic bursitis and cellulitis.  Seeing Dr Sekou burton.  Continued knee pain that intermittently flares and is severe in the right.  Has seen ortho and only options are injections or surgery but patient continues to refuse these options at this time.  Last script filled 5/22/2024 for hydrocodone 7.5-325 #90 tabs.        The  following portions of the patient's history were reviewed and updated as appropriate: allergies, current medications, past family history, past medical history, past social history, past surgical history and problem list.    Depression Screen:      6/3/2024     1:35 PM   PHQ-2/PHQ-9 Depression Screening   Little Interest or Pleasure in Doing Things 0-->not at all   Feeling Down, Depressed or Hopeless 0-->not at all   PHQ-9: Brief Depression Severity Measure Score 0       Past Medical History:   Diagnosis Date    Abnormal findings on diagnostic imaging of abdomen     Alopecia areata     Ankle sprain right ankle    Arthritis of neck cervical ribs    Asthma     Bleeding external hemorrhoids     BMI 25.0-25.9,adult     Calf cramp     Carpal tunnel syndrome     Cervical disc disorder cervical ribs 3surgries    Chest pain     Chest wall pain     Constipation     COPD (chronic obstructive pulmonary disease)     COPD with acute exacerbation     Discoloration of skin of foot     Dyslipidemia     Dysuria     Eczema     Encounter for immunization     Esophageal reflux     Fatty liver     Fibromyalgia, primary     GERD (gastroesophageal reflux disease)     Headache     Hives of unknown origin     Hyperactivity of bladder     Hypertension     IBS (irritable bowel syndrome)     Incontinence of urine     Insomnia     Menopausal symptom     Migraine     MRSA exposure     Muscle spasm     Nocturia     OA (osteoarthritis)     Obstructive chronic bronchitis with acute exacerbation     Oral thrush     Osteoarthritis of left knee     Osteoporosis     PAD (peripheral artery disease)     Periarthritis of shoulder     Sinusitis, acute     Synovial cyst of popliteal space     Thoracic disc disorder     Tremor     Tubular adenoma of colon     Urinary frequency     Urinary urgency     Urinary, incontinence, stress female     UTI (urinary tract infection)        Past Surgical History:   Procedure Laterality Date    BREAST LUMPECTOMY       BREAST SURGERY      COLONOSCOPY      EYE SURGERY  catract surgery    HAND SURGERY  carpel tunnel    HYSTERECTOMY      Not due to cancer    INCONTINENCE SURGERY      NECK SURGERY      NEUROPLASTY      Median nerve at carpal tunnel.    OOPHORECTOMY      OTHER SURGICAL HISTORY  08/20/2014    Esophadogastroduodenoscopy - Gastritis     TONSILLECTOMY      TUBAL ABDOMINAL LIGATION      WRIST SURGERY         Family History   Problem Relation Age of Onset    Heart failure Father     Heart disease Father        Social History     Socioeconomic History    Marital status:    Tobacco Use    Smoking status: Every Day     Current packs/day: 0.50     Average packs/day: 0.5 packs/day for 50.0 years (25.0 ttl pk-yrs)     Types: Cigarettes    Smokeless tobacco: Never   Vaping Use    Vaping status: Never Used   Substance and Sexual Activity    Alcohol use: Yes     Comment: occasional beer with pizza    Drug use: No    Sexual activity: Defer       Current Outpatient Medications   Medication Sig Dispense Refill    albuterol sulfate  (90 Base) MCG/ACT inhaler INHALE 2 PUFFS BY MOUTH EVERY 4 HOURS AS NEEDED 8.5 g 6    amitriptyline (ELAVIL) 100 MG tablet Take 1 tablet by mouth every night at bedtime. 90 tablet 1    arformoterol (BROVANA) 15 MCG/2ML nebulizer solution Take  by nebulization 2 (Two) Times a Day.      budesonide (PULMICORT) 0.5 MG/2ML nebulizer solution Take 2 mL by nebulization Daily.      CRANBERRY PO Take 1 tablet by mouth Daily.      doxylamine (UNISOM) 25 MG tablet Take 1 tablet by mouth At Night As Needed.      HYDROcodone-acetaminophen (NORCO) 7.5-325 MG per tablet Take 1 tablet by mouth Every 8 (Eight) Hours As Needed for Moderate Pain. 90 tablet 0    hydrocortisone 2.5 % cream Apply 2.5 application topically to the appropriate area as directed 3 (Three) Times a Day.      ipratropium-albuterol (DUO-NEB) 0.5-2.5 mg/3 ml nebulizer Take 3 mL by nebulization 4 (Four) Times a Day. 360 mL 3    metoprolol  "tartrate (LOPRESSOR) 100 MG tablet Take 1 tablet by mouth 2 (Two) Times a Day. 180 tablet 3    pantoprazole (PROTONIX) 40 MG EC tablet Take 1 tablet by mouth Daily. 90 tablet 1    triamcinolone (KENALOG) 0.1 % cream Apply  topically to the appropriate area as directed 2 (Two) Times a Day. 45 g 0    vitamin B-12 (CYANOCOBALAMIN) 1000 MCG tablet Take 1 tablet by mouth Daily.       No current facility-administered medications for this visit.       Review of Systems   Constitutional:  Positive for fatigue. Negative for activity change, appetite change, fever, unexpected weight gain and unexpected weight loss.   HENT:  Negative for nosebleeds, rhinorrhea, trouble swallowing and voice change.    Eyes:  Negative for visual disturbance.   Respiratory:  Positive for shortness of breath. Negative for cough, chest tightness and wheezing.    Cardiovascular:  Negative for chest pain, palpitations and leg swelling.   Gastrointestinal:  Negative for abdominal pain, blood in stool, constipation, diarrhea, nausea, vomiting, GERD and indigestion.   Genitourinary:  Negative for dysuria, frequency and hematuria.   Musculoskeletal:  Positive for back pain. Negative for arthralgias and myalgias.   Skin:  Negative for rash and wound.   Neurological:  Negative for dizziness, tremors, weakness, light-headedness, numbness, headache and memory problem.   Hematological:  Negative for adenopathy. Does not bruise/bleed easily.   Psychiatric/Behavioral:  Negative for sleep disturbance and depressed mood. The patient is not nervous/anxious.        Objective   /70 (BP Location: Left arm, Patient Position: Sitting, Cuff Size: Large Adult)   Pulse 89   Temp 98.7 °F (37.1 °C) (Temporal)   Ht 167.6 cm (65.98\")   Wt 70.3 kg (155 lb)   SpO2 97%   BMI 25.03 kg/m²     Physical Exam  Vitals and nursing note reviewed.   Constitutional:       General: She is not in acute distress.     Appearance: She is well-developed. She is not diaphoretic. "   HENT:      Head: Normocephalic and atraumatic.      Right Ear: External ear normal.      Left Ear: External ear normal.      Nose: Nose normal.   Eyes:      Conjunctiva/sclera: Conjunctivae normal.      Pupils: Pupils are equal, round, and reactive to light.   Neck:      Thyroid: No thyromegaly.      Trachea: No tracheal deviation.   Cardiovascular:      Rate and Rhythm: Normal rate and regular rhythm.      Heart sounds: Normal heart sounds. No murmur heard.     No friction rub. No gallop.   Pulmonary:      Effort: Pulmonary effort is normal. No respiratory distress.      Breath sounds: Normal breath sounds.      Comments: Using Oxygen per nasal cannula.  Abdominal:      General: Bowel sounds are normal.      Palpations: Abdomen is soft. There is no mass.      Tenderness: There is no abdominal tenderness. There is no guarding.   Musculoskeletal:         General: Normal range of motion.      Cervical back: Normal range of motion and neck supple.   Lymphadenopathy:      Cervical: No cervical adenopathy.   Skin:     General: Skin is warm and dry.      Capillary Refill: Capillary refill takes less than 2 seconds.      Findings: No rash.   Neurological:      Mental Status: She is alert and oriented to person, place, and time.      Motor: No abnormal muscle tone.      Deep Tendon Reflexes: Reflexes normal.   Psychiatric:         Behavior: Behavior normal.         Thought Content: Thought content normal.         Judgment: Judgment normal.     No results found for this or any previous visit (from the past 2016 hour(s)).  Assessment & Plan   Diagnoses and all orders for this visit:    1. Primary hypertension (Primary)    2. Chronic pain syndrome    3. Primary osteoarthritis involving multiple joints      Hypertension is well-controlled.  Continue current medication.  We discussed her chronic pain syndrome and osteoarthritis.  She is utilizing the Hydrocodone 7.5 as needed with most recent refill on 8/23/2024.  ROGER run  and reviewed.  Risks of the medication include but are not limited to fatigue, somnolence, increased risk of falls, constipation, allergic reaction, dependence, and addiction.  Is unable to discontinue at this time and trying to get her to wean down on use.  Patient will follow-up in 3 months which time we will do all labs.         Dictated utilizing Dragon Dictation

## 2024-09-23 DIAGNOSIS — G89.4 CHRONIC PAIN SYNDROME: ICD-10-CM

## 2024-09-23 DIAGNOSIS — M15.9 PRIMARY OSTEOARTHRITIS INVOLVING MULTIPLE JOINTS: ICD-10-CM

## 2024-09-23 RX ORDER — HYDROCODONE BITARTRATE AND ACETAMINOPHEN 7.5; 325 MG/1; MG/1
1 TABLET ORAL EVERY 8 HOURS PRN
Qty: 90 TABLET | Refills: 0 | Status: SHIPPED | OUTPATIENT
Start: 2024-09-23

## 2024-10-21 DIAGNOSIS — G89.4 CHRONIC PAIN SYNDROME: ICD-10-CM

## 2024-10-21 DIAGNOSIS — M15.0 PRIMARY OSTEOARTHRITIS INVOLVING MULTIPLE JOINTS: ICD-10-CM

## 2024-10-21 RX ORDER — HYDROCODONE BITARTRATE AND ACETAMINOPHEN 7.5; 325 MG/1; MG/1
1 TABLET ORAL EVERY 8 HOURS PRN
Qty: 90 TABLET | Refills: 0 | Status: SHIPPED | OUTPATIENT
Start: 2024-10-21

## 2024-11-21 DIAGNOSIS — M15.0 PRIMARY OSTEOARTHRITIS INVOLVING MULTIPLE JOINTS: ICD-10-CM

## 2024-11-21 DIAGNOSIS — G89.4 CHRONIC PAIN SYNDROME: ICD-10-CM

## 2024-11-25 RX ORDER — ALBUTEROL SULFATE 90 UG/1
2 INHALANT RESPIRATORY (INHALATION) EVERY 4 HOURS PRN
Qty: 8.5 G | Refills: 6 | Status: SHIPPED | OUTPATIENT
Start: 2024-11-25

## 2024-11-25 RX ORDER — HYDROCODONE BITARTRATE AND ACETAMINOPHEN 7.5; 325 MG/1; MG/1
1 TABLET ORAL EVERY 8 HOURS PRN
Qty: 90 TABLET | Refills: 0 | Status: SHIPPED | OUTPATIENT
Start: 2024-11-25

## 2024-12-05 ENCOUNTER — OFFICE VISIT (OUTPATIENT)
Dept: FAMILY MEDICINE CLINIC | Facility: CLINIC | Age: 69
End: 2024-12-05
Payer: MEDICARE

## 2024-12-05 VITALS
BODY MASS INDEX: 24.43 KG/M2 | TEMPERATURE: 98.7 F | HEART RATE: 90 BPM | WEIGHT: 152 LBS | SYSTOLIC BLOOD PRESSURE: 128 MMHG | HEIGHT: 66 IN | DIASTOLIC BLOOD PRESSURE: 76 MMHG | OXYGEN SATURATION: 96 %

## 2024-12-05 DIAGNOSIS — Z53.20 MAMMOGRAM DECLINED: ICD-10-CM

## 2024-12-05 DIAGNOSIS — Z53.20 STATIN MEDICATION DECLINED BY PATIENT: ICD-10-CM

## 2024-12-05 DIAGNOSIS — I10 PRIMARY HYPERTENSION: ICD-10-CM

## 2024-12-05 DIAGNOSIS — E78.00 PURE HYPERCHOLESTEROLEMIA: ICD-10-CM

## 2024-12-05 DIAGNOSIS — Z53.20 COLON CANCER SCREENING DECLINED: ICD-10-CM

## 2024-12-05 DIAGNOSIS — Z00.00 MEDICARE ANNUAL WELLNESS VISIT, SUBSEQUENT: Primary | ICD-10-CM

## 2024-12-05 PROCEDURE — G0439 PPPS, SUBSEQ VISIT: HCPCS | Performed by: INTERNAL MEDICINE

## 2024-12-05 PROCEDURE — 3078F DIAST BP <80 MM HG: CPT | Performed by: INTERNAL MEDICINE

## 2024-12-05 PROCEDURE — 3074F SYST BP LT 130 MM HG: CPT | Performed by: INTERNAL MEDICINE

## 2024-12-05 PROCEDURE — 1126F AMNT PAIN NOTED NONE PRSNT: CPT | Performed by: INTERNAL MEDICINE

## 2024-12-05 NOTE — PROGRESS NOTES
Subjective   The ABCs of the Annual Wellness Visit  Medicare Wellness Visit      Lula Decker is a 69 y.o. patient who presents for a Medicare Wellness Visit.  History of hypertension.  Currently, has been feeling well and asymptomatic without any headaches, cough, chest pain, shortness of breath, swelling, focal neurologic deficit, memory loss or syncope.  Has been taking the medications regularly and adherent with the regimen of metoprolol tartrate 100 mg twice a day.  Denies medication side effects and no significant interval events.      History of high cholesterol.  Currently, has been feeling well without any myalgias, muscle aches, weakness, numbness, chest pain, short of breath or other issues.  Currently, is has not been taking any medications for the cholesterol for the last 18 months.  Patient is adamant will not take any medications for the cholesterol and last labs were done on 8/7/2023 , HDL 69, triglycerides 277, and total cholesterol 331 with normal CMP.     History of GERD and intermittent nausea lasting 5-10 minutes then self resolves.  This has been a chronic issue for a long time and unchanged.  She continues with the pantoprazole 40 mg daily.     Patient with COPD and on oxygen nasal canula and using the inhaled medications is stable and continues to see pulmonology.  Now on duo-neb, pulmicort and brovana.  Having progressive SOA and wheezing.     Continues to have chronic pain in the back, shoulders and knee.  Using the hydrocodone as needed and averages 2-3 times per day.  Denies side effects or issues with the problems or medications.  Having right elbow pain and swelling with septic bursitis and cellulitis.  Seeing Dr Sekou burton.  Continued knee pain that intermittently flares and is severe in the right.  Has seen ortho and only options are injections or surgery but patient continues to refuse these options at this time.  Last script filled 11/25/2024 for hydrocodone 7.5-325 #90  tabs.  Last urine compliance screen on 3/4/2024    The following portions of the patient's history were reviewed and updated as appropriate: allergies, current medications, past family history, past medical history, past social history, past surgical history, and problem list.  Compared to one year ago, the patient's physical health is the same.  Compared to one year ago, the patient's mental health is the same.    Recent Hospitalizations:  She was not admitted to the hospital during the last year.     Current Medical Providers:  Patient Care Team:  Nghia Eaton MD as PCP - General (Internal Medicine)  Jeb Smart MD as Consulting Physician (Orthopedic Surgery)  Stefani Prince MD (Pulmonary Disease)  Hank Sawant MD as Consulting Physician (Gastroenterology)    Outpatient Medications Prior to Visit   Medication Sig Dispense Refill    albuterol sulfate  (90 Base) MCG/ACT inhaler Inhale 2 puffs Every 4 (Four) Hours As Needed for Wheezing. 8.5 g 6    amitriptyline (ELAVIL) 100 MG tablet Take 1 tablet by mouth every night at bedtime. 90 tablet 1    arformoterol (BROVANA) 15 MCG/2ML nebulizer solution Take  by nebulization 2 (Two) Times a Day.      budesonide (PULMICORT) 0.5 MG/2ML nebulizer solution Take 2 mL by nebulization Daily.      CRANBERRY PO Take 1 tablet by mouth Daily.      doxylamine (UNISOM) 25 MG tablet Take 1 tablet by mouth At Night As Needed.      HYDROcodone-acetaminophen (NORCO) 7.5-325 MG per tablet Take 1 tablet by mouth Every 8 (Eight) Hours As Needed for Moderate Pain. 90 tablet 0    hydrocortisone 2.5 % cream Apply 2.5 application topically to the appropriate area as directed 3 (Three) Times a Day.      ipratropium-albuterol (DUO-NEB) 0.5-2.5 mg/3 ml nebulizer Take 3 mL by nebulization 4 (Four) Times a Day. 360 mL 3    metoprolol tartrate (LOPRESSOR) 100 MG tablet Take 1 tablet by mouth 2 (Two) Times a Day. 180 tablet 3    pantoprazole (PROTONIX) 40 MG EC tablet Take 1  tablet by mouth Daily. 90 tablet 1    triamcinolone (KENALOG) 0.1 % cream Apply  topically to the appropriate area as directed 2 (Two) Times a Day. 45 g 0    vitamin B-12 (CYANOCOBALAMIN) 1000 MCG tablet Take 1 tablet by mouth Daily.       No facility-administered medications prior to visit.     Opioid medication/s are on active medication list.  and I have evaluated her active treatment plan and pain score trends (see table).  Vitals:    12/05/24 1304   PainSc: 0-No pain     I have reviewed the chart for potential of high risk medication and harmful drug interactions in the elderly.        Aspirin is not on active medication list.  Aspirin use is not indicated based on review of current medical condition/s. Risk of harm outweighs potential benefits.  .    Patient Active Problem List   Diagnosis    Chronic pain syndrome    Hypertension    GERD (gastroesophageal reflux disease)    UTI (urinary tract infection)    Right knee pain    Visual disturbances    Postmenopausal symptoms    Worsening headaches    Hyperlipidemia    Urinary symptom or sign    OA (osteoarthritis)    Edema of left ankle    Edema of left lower extremity due to peripheral venous insufficiency    Varicose veins of both lower extremities    Asthma    COPD (chronic obstructive pulmonary disease)    Insomnia    Rash of foot    Herpes zoster without complication    Discoloration of skin of foot    Skin lesion of right arm    Facial rash    Contact dermatitis    Mammogram declined    Medicare annual wellness visit, subsequent    Pain and swelling of right elbow    Epigastric abdominal tenderness without rebound tenderness    Cellulitis of right upper limb    Septic bursitis of elbow, right    Peripheral vascular disease    Supplemental oxygen dependent    DNR (do not resuscitate)    Colon cancer screening declined    Statin medication declined by patient     Advance Care Planning Advance Directive is on file.  ACP discussion was held with the patient  "during this visit. Patient has an advance directive in EMR which is still valid.       Objective   Vitals:    24 1304   BP: 128/76   BP Location: Left arm   Patient Position: Sitting   Cuff Size: Adult   Pulse: 90   Temp: 98.7 °F (37.1 °C)   TempSrc: Temporal   SpO2: 96%   Weight: 68.9 kg (152 lb)   Height: 167.6 cm (66\")   PainSc: 0-No pain       Estimated body mass index is 24.53 kg/m² as calculated from the following:    Height as of this encounter: 167.6 cm (66\").    Weight as of this encounter: 68.9 kg (152 lb).    BMI is within normal parameters. No other follow-up for BMI required.     Does the patient have evidence of cognitive impairment? No, minicog 5 out of 5 score                                                                                           Health  Risk Assessment    Smoking Status:  Social History     Tobacco Use   Smoking Status Every Day    Current packs/day: 0.50    Average packs/day: 0.5 packs/day for 50.0 years (25.0 ttl pk-yrs)    Types: Cigarettes   Smokeless Tobacco Never     Alcohol Consumption:  Social History     Substance and Sexual Activity   Alcohol Use Yes    Comment: occasional beer with pizza       Fall Risk Screen  STEADI Fall Risk Assessment was completed, and patient is at LOW risk for falls.Assessment completed on:2024    Depression Screening   Little interest or pleasure in doing things? Not at all   Feeling down, depressed, or hopeless? Not at all   PHQ-2 Total Score 0      Health Habits and Functional and Cognitive Screenin/28/2024     8:30 PM   Functional & Cognitive Status   Do you have difficulty preparing food and eating? No    Do you have difficulty bathing yourself, getting dressed or grooming yourself? No    Do you have difficulty using the toilet? No    Do you have difficulty moving around from place to place? Yes    Do you have trouble with steps or getting out of a bed or a chair? No    Current Diet Well Balanced Diet    Dental Exam Not " up to date    Eye Exam Not up to date    Exercise (times per week) 0 times per week    Current Exercises Include No Regular Exercise    Do you need help using the phone?  No    Are you deaf or do you have serious difficulty hearing?  No    Do you need help to go to places out of walking distance? Yes    Do you need help shopping? No    Do you need help preparing meals?  No    Do you need help with housework?  Yes    Do you need help with laundry? No    Do you need help taking your medications? No    Do you need help managing money? No    Do you ever drive or ride in a car without wearing a seat belt? No    Have you felt unusual stress, anger or loneliness in the last month? No    Who do you live with? Alone    If you need help, do you have trouble finding someone available to you? Yes    Have you been bothered in the last four weeks by sexual problems? No    Do you have difficulty concentrating, remembering or making decisions? Yes        Patient-reported           Age-appropriate Screening Schedule:  Refer to the list below for future screening recommendations based on patient's age, sex and/or medical conditions. Orders for these recommended tests are listed in the plan section. The patient has been provided with a written plan.    Health Maintenance List  Health Maintenance   Topic Date Due    TDAP/TD VACCINES (1 - Tdap) Never done    ZOSTER VACCINE (1 of 2) Never done    MAMMOGRAM  12/04/2026 (Originally 12/5/2024)    ANNUAL WELLNESS VISIT  12/05/2025    HEPATITIS C SCREENING  Completed    COVID-19 Vaccine  Completed    INFLUENZA VACCINE  Completed    Pneumococcal Vaccine 65+  Completed    LIPID PANEL  Discontinued    DXA SCAN  Discontinued    LUNG CANCER SCREENING  Discontinued    COLORECTAL CANCER SCREENING  Discontinued   The ASCVD Risk score (Enrico IRVING, et al., 2019) failed to calculate for the following reasons:    The valid total cholesterol range is 130 to 320 mg/dL                                                                                                                                                CMS Preventative Services Quick Reference  Risk Factors Identified During Encounter  Fall Risk-High or Moderate: Discussed Fall Prevention in the home, Information on Fall Prevention Shared in After Visit Summary, and Sit to Stand Exercise Information Shared in After Visit Summary  Immunizations Discussed/Encouraged: Tdap, Shingrix, and RSV (Respiratory Syncytial Virus)  Diagnoses and all orders for this visit:    1. Medicare annual wellness visit, subsequent (Primary)    2. Primary hypertension  -     Comprehensive Metabolic Panel    3. Pure hypercholesterolemia    4. Colon cancer screening declined    5. Mammogram declined    6. Statin medication declined by patient      Reviewed history and annual wellness visit with patient during office time.  Medications reviewed as appropriate.  Discussed advanced directives and living will.  Patient has living will: Living will: Directive already scanned in chart.  Discussed fall risk and precautions encourage removing throw rugs and using grab bars within the home and bathroom.  Will check the labs as ordered above to evaluate the blood sugars, kidney, liver, cholesterol for screening.  Discussed flu shot recommended to get the high-dose influenza vaccine annually in the fall.  The patient has a COVID HM Topic on their chart, and they are fully vaccinated.  Influenza, covid booster, Prevnar-13 and pneumovax-23 up to date and appropriate.  RSV, Dtap, and Shingrix vaccination series recommended.  Encourage follow-up with the eye doctor on annual basis for glaucoma evaluation.  Discussed weight and encouraged exercise as tolerated while following a healthy diet.  Colon cancer screening discussed and patient absolutely declines screening as could not tolerate.  Recommend to get annual mammograms.  Follow-up with gynecology and specialists as scheduled.  Patient had low dose CT  chest 10/1/24 with pulmonology.  After discussion with the patient concerning her cholesterol patient refuses any treatment for high cholesterol and states even if it is high she would not do any care treatment for this.  Therefore doing lipid panel is not beneficial at this time.  We discussed all cancer screenings and she declines all of them despite our counseling and she understands the risks.    Lula Decker  reports that she has been smoking cigarettes. She has a 25 pack-year smoking history. She has never used smokeless tobacco. I have educated her on the risk of diseases from using tobacco products such as cancer, COPD, heart disease, cataracts, and arterial disease.   I advised her to quit and she is not willing to quit.  I spent 3  minutes counseling the patient.       The above risks/problems have been discussed with the patient.  Pertinent information has been shared with the patient in the After Visit Summary.  An After Visit Summary and PPPS were made available to the patient.    Follow Up:   Next Medicare Wellness visit to be scheduled in 1 year.

## 2024-12-06 LAB
ALBUMIN SERPL-MCNC: 4.8 G/DL (ref 3.9–4.9)
ALP SERPL-CCNC: 81 IU/L (ref 44–121)
ALT SERPL-CCNC: 11 IU/L (ref 0–32)
AST SERPL-CCNC: 22 IU/L (ref 0–40)
BILIRUB SERPL-MCNC: 0.2 MG/DL (ref 0–1.2)
BUN SERPL-MCNC: 6 MG/DL (ref 8–27)
BUN/CREAT SERPL: 8 (ref 12–28)
CALCIUM SERPL-MCNC: 9.6 MG/DL (ref 8.7–10.3)
CHLORIDE SERPL-SCNC: 95 MMOL/L (ref 96–106)
CO2 SERPL-SCNC: 28 MMOL/L (ref 20–29)
CREAT SERPL-MCNC: 0.75 MG/DL (ref 0.57–1)
EGFRCR SERPLBLD CKD-EPI 2021: 86 ML/MIN/1.73
GLOBULIN SER CALC-MCNC: 2.8 G/DL (ref 1.5–4.5)
GLUCOSE SERPL-MCNC: 93 MG/DL (ref 70–99)
POTASSIUM SERPL-SCNC: 4.4 MMOL/L (ref 3.5–5.2)
PROT SERPL-MCNC: 7.6 G/DL (ref 6–8.5)
SODIUM SERPL-SCNC: 137 MMOL/L (ref 134–144)

## 2024-12-23 DIAGNOSIS — I10 ESSENTIAL HYPERTENSION: ICD-10-CM

## 2024-12-23 DIAGNOSIS — M15.0 PRIMARY OSTEOARTHRITIS INVOLVING MULTIPLE JOINTS: ICD-10-CM

## 2024-12-23 DIAGNOSIS — G89.4 CHRONIC PAIN SYNDROME: ICD-10-CM

## 2024-12-23 RX ORDER — METOPROLOL TARTRATE 100 MG/1
100 TABLET ORAL 2 TIMES DAILY
Qty: 180 TABLET | Refills: 3 | Status: SHIPPED | OUTPATIENT
Start: 2024-12-23

## 2024-12-23 RX ORDER — HYDROCODONE BITARTRATE AND ACETAMINOPHEN 7.5; 325 MG/1; MG/1
1 TABLET ORAL EVERY 8 HOURS PRN
Qty: 90 TABLET | Refills: 0 | Status: SHIPPED | OUTPATIENT
Start: 2024-12-23

## 2025-01-20 DIAGNOSIS — M15.0 PRIMARY OSTEOARTHRITIS INVOLVING MULTIPLE JOINTS: ICD-10-CM

## 2025-01-20 DIAGNOSIS — G89.4 CHRONIC PAIN SYNDROME: ICD-10-CM

## 2025-01-20 RX ORDER — HYDROCODONE BITARTRATE AND ACETAMINOPHEN 7.5; 325 MG/1; MG/1
1 TABLET ORAL EVERY 8 HOURS PRN
Qty: 90 TABLET | Refills: 0 | Status: SHIPPED | OUTPATIENT
Start: 2025-01-20

## 2025-01-30 DIAGNOSIS — K21.9 GASTROESOPHAGEAL REFLUX DISEASE WITHOUT ESOPHAGITIS: ICD-10-CM

## 2025-01-30 DIAGNOSIS — F51.01 PRIMARY INSOMNIA: ICD-10-CM

## 2025-01-31 RX ORDER — PANTOPRAZOLE SODIUM 40 MG/1
40 TABLET, DELAYED RELEASE ORAL DAILY
Qty: 90 TABLET | Refills: 1 | Status: SHIPPED | OUTPATIENT
Start: 2025-01-31

## 2025-01-31 RX ORDER — AMITRIPTYLINE HYDROCHLORIDE 100 MG/1
100 TABLET ORAL
Qty: 90 TABLET | Refills: 1 | Status: SHIPPED | OUTPATIENT
Start: 2025-01-31

## 2025-02-19 DIAGNOSIS — G89.4 CHRONIC PAIN SYNDROME: ICD-10-CM

## 2025-02-19 DIAGNOSIS — M15.0 PRIMARY OSTEOARTHRITIS INVOLVING MULTIPLE JOINTS: ICD-10-CM

## 2025-02-20 RX ORDER — HYDROCODONE BITARTRATE AND ACETAMINOPHEN 7.5; 325 MG/1; MG/1
1 TABLET ORAL EVERY 8 HOURS PRN
Qty: 90 TABLET | Refills: 0 | Status: SHIPPED | OUTPATIENT
Start: 2025-02-20

## 2025-03-06 ENCOUNTER — OFFICE VISIT (OUTPATIENT)
Dept: FAMILY MEDICINE CLINIC | Facility: CLINIC | Age: 70
End: 2025-03-06
Payer: MEDICARE

## 2025-03-06 VITALS
HEIGHT: 66 IN | SYSTOLIC BLOOD PRESSURE: 124 MMHG | DIASTOLIC BLOOD PRESSURE: 84 MMHG | TEMPERATURE: 97.8 F | OXYGEN SATURATION: 97 % | BODY MASS INDEX: 25.3 KG/M2 | HEART RATE: 84 BPM | WEIGHT: 157.4 LBS

## 2025-03-06 DIAGNOSIS — Z51.81 ENCOUNTER FOR MEDICATION MONITORING: ICD-10-CM

## 2025-03-06 DIAGNOSIS — E78.5 HYPERLIPIDEMIA, UNSPECIFIED HYPERLIPIDEMIA TYPE: ICD-10-CM

## 2025-03-06 DIAGNOSIS — G89.4 CHRONIC PAIN SYNDROME: ICD-10-CM

## 2025-03-06 DIAGNOSIS — I10 PRIMARY HYPERTENSION: Primary | ICD-10-CM

## 2025-03-06 DIAGNOSIS — J44.9 CHRONIC OBSTRUCTIVE PULMONARY DISEASE, UNSPECIFIED COPD TYPE: ICD-10-CM

## 2025-03-06 DIAGNOSIS — Z99.81 SUPPLEMENTAL OXYGEN DEPENDENT: ICD-10-CM

## 2025-03-06 NOTE — PROGRESS NOTES
Subjective   Lula Decker is a 69 y.o. female.     Chief Complaint   Patient presents with    Follow-up     3 mth checkup        History of Present Illness     History of hypertension.  Currently, has been feeling well and asymptomatic without any headaches, cough, chest pain, shortness of breath, swelling, focal neurologic deficit, memory loss or syncope.  Has been taking the medications regularly and adherent with the regimen of metoprolol tartrate 100 mg twice a day.  Denies medication side effects and no significant interval events.      History of high cholesterol.  Currently, has been feeling well without any myalgias, muscle aches, weakness, numbness, chest pain, short of breath or other issues.  Currently, is has not been taking any medications for the cholesterol for the last 18 months.  Patient is adamant will not take any medications for the cholesterol and last labs were done on 8/7/2023 , HDL 69, triglycerides 277, and total cholesterol 331 with normal CMP.     History of GERD and intermittent nausea lasting 5-10 minutes then self resolves.  This has been a chronic issue for a long time and unchanged.  She continues with the pantoprazole 40 mg daily.     Patient with COPD and on oxygen nasal canula and using the inhaled medications is stable and continues to see pulmonology.  Now on duo-neb, pulmicort and brovana.  Having progressive SOA and wheezing.     Continues to have chronic pain in the back, shoulders and knee.  Using the hydrocodone as needed and averages 2-3 times per day.  Denies side effects or issues with the problems or medications.  Having right elbow pain and swelling with septic bursitis and cellulitis.  Seeing Dr Sekou burton.  Continued knee pain that intermittently flares and is severe in the right.  Has seen ortho and only options are injections or surgery but patient continues to refuse these options at this time.  Last script filled 12/21/25 for hydrocodone 7.5-325 #90  tabs.  Last urine compliance screen on 3/4/2024.    The following portions of the patient's history were reviewed and updated as appropriate: allergies, current medications, past family history, past medical history, past social history, past surgical history and problem list.    Depression Screen:      12/5/2024     1:04 PM   PHQ-2/PHQ-9 Depression Screening   Little interest or pleasure in doing things Not at all   Feeling down, depressed, or hopeless Not at all       Past Medical History:   Diagnosis Date    Abnormal findings on diagnostic imaging of abdomen     Alopecia areata     Ankle sprain right ankle    Arthritis of neck cervical ribs    Asthma     Bleeding external hemorrhoids     Calf cramp     Carpal tunnel syndrome     Cervical disc disorder cervical ribs 3surgries    Chest pain     Chest wall pain     Constipation     COPD (chronic obstructive pulmonary disease)     COPD with acute exacerbation     Discoloration of skin of foot     Dyslipidemia     Dysuria     Eczema     Encounter for immunization     Esophageal reflux     Fatty liver     Fibromyalgia, primary     GERD (gastroesophageal reflux disease)     Headache     Hives of unknown origin     Hyperactivity of bladder     Hypertension     IBS (irritable bowel syndrome)     Incontinence of urine     Insomnia     Menopausal symptom     Migraine     MRSA exposure     Muscle spasm     Nocturia     OA (osteoarthritis)     Obstructive chronic bronchitis with acute exacerbation     Oral thrush     Osteoarthritis of left knee     Osteoporosis     PAD (peripheral artery disease)     Periarthritis of shoulder     Sinusitis, acute     Synovial cyst of popliteal space     Thoracic disc disorder     Tremor     Tubular adenoma of colon     Urinary frequency     Urinary urgency     Urinary, incontinence, stress female     UTI (urinary tract infection)        Past Surgical History:   Procedure Laterality Date    BREAST LUMPECTOMY      BREAST SURGERY       COLONOSCOPY      EYE SURGERY  catract surgery    HAND SURGERY  carpel tunnel    HYSTERECTOMY      Not due to cancer    INCONTINENCE SURGERY      NECK SURGERY      NEUROPLASTY      Median nerve at carpal tunnel.    OOPHORECTOMY      OTHER SURGICAL HISTORY  08/20/2014    Esophadogastroduodenoscopy - Gastritis     TONSILLECTOMY      TUBAL ABDOMINAL LIGATION      WRIST SURGERY         Family History   Problem Relation Age of Onset    Heart failure Father     Heart disease Father        Social History     Socioeconomic History    Marital status:    Tobacco Use    Smoking status: Every Day     Current packs/day: 0.50     Average packs/day: 0.5 packs/day for 50.0 years (25.0 ttl pk-yrs)     Types: Cigarettes    Smokeless tobacco: Never   Vaping Use    Vaping status: Never Used   Substance and Sexual Activity    Alcohol use: Yes     Comment: occasional beer with pizza    Drug use: No    Sexual activity: Defer       Current Outpatient Medications   Medication Sig Dispense Refill    albuterol sulfate  (90 Base) MCG/ACT inhaler Inhale 2 puffs Every 4 (Four) Hours As Needed for Wheezing. 8.5 g 6    amitriptyline (ELAVIL) 100 MG tablet Take 1 tablet by mouth every night at bedtime. 90 tablet 1    arformoterol (BROVANA) 15 MCG/2ML nebulizer solution Take  by nebulization 2 (Two) Times a Day.      budesonide (PULMICORT) 0.5 MG/2ML nebulizer solution Take 2 mL by nebulization Daily.      CRANBERRY PO Take 1 tablet by mouth Daily.      doxylamine (UNISOM) 25 MG tablet Take 1 tablet by mouth At Night As Needed.      HYDROcodone-acetaminophen (NORCO) 7.5-325 MG per tablet Take 1 tablet by mouth Every 8 (Eight) Hours As Needed for Moderate Pain. 90 tablet 0    hydrocortisone 2.5 % cream Apply 2.5 application topically to the appropriate area as directed 3 (Three) Times a Day.      ipratropium-albuterol (DUO-NEB) 0.5-2.5 mg/3 ml nebulizer Take 3 mL by nebulization 4 (Four) Times a Day. 360 mL 3    metoprolol tartrate  "(LOPRESSOR) 100 MG tablet Take 1 tablet by mouth 2 (Two) Times a Day. 180 tablet 3    pantoprazole (PROTONIX) 40 MG EC tablet Take 1 tablet by mouth Daily. 90 tablet 1    triamcinolone (KENALOG) 0.1 % cream Apply  topically to the appropriate area as directed 2 (Two) Times a Day. 45 g 0    vitamin B-12 (CYANOCOBALAMIN) 1000 MCG tablet Take 1 tablet by mouth Daily.       No current facility-administered medications for this visit.       Review of Systems   Constitutional:  Positive for fatigue. Negative for chills and diaphoresis.   HENT:  Positive for congestion.    Respiratory:  Positive for cough and shortness of breath.    Cardiovascular:  Positive for chest pain.   Gastrointestinal:  Positive for nausea. Negative for abdominal pain.   Musculoskeletal:  Positive for myalgias and neck pain.   Neurological:  Positive for weakness and numbness.       Objective   /84 (BP Location: Left arm, Patient Position: Sitting, Cuff Size: Adult)   Pulse 84   Temp 97.8 °F (36.6 °C) (Temporal)   Ht 167.6 cm (65.98\")   Wt 71.4 kg (157 lb 6.4 oz)   SpO2 97%   BMI 25.42 kg/m²     Physical Exam  Vitals and nursing note reviewed.   Constitutional:       General: She is not in acute distress.     Appearance: She is well-developed. She is not diaphoretic.   HENT:      Head: Normocephalic and atraumatic.      Right Ear: External ear normal.      Left Ear: External ear normal.      Nose: Nose normal.   Eyes:      Conjunctiva/sclera: Conjunctivae normal.      Pupils: Pupils are equal, round, and reactive to light.   Neck:      Thyroid: No thyromegaly.      Trachea: No tracheal deviation.   Cardiovascular:      Rate and Rhythm: Normal rate and regular rhythm.      Heart sounds: Normal heart sounds. No murmur heard.     No friction rub. No gallop.   Pulmonary:      Comments: Patient utilizing oxygen per nasal cannula.  Breath sounds were noted to be decreased throughout without any wheezing, rhonchi, stridor.  Abdominal:      " General: Bowel sounds are normal.      Palpations: Abdomen is soft. There is no mass.      Tenderness: There is no abdominal tenderness. There is no guarding.   Musculoskeletal:         General: Normal range of motion.      Cervical back: Normal range of motion and neck supple.   Lymphadenopathy:      Cervical: No cervical adenopathy.   Skin:     General: Skin is warm and dry.      Capillary Refill: Capillary refill takes less than 2 seconds.      Findings: No rash.   Neurological:      Mental Status: She is alert and oriented to person, place, and time.      Motor: No abnormal muscle tone.      Deep Tendon Reflexes: Reflexes normal.   Psychiatric:         Behavior: Behavior normal.         Thought Content: Thought content normal.         Judgment: Judgment normal.         No results found for this or any previous visit (from the past 12 weeks).  Assessment & Plan   Diagnoses and all orders for this visit:    1. Primary hypertension (Primary)    2. Hyperlipidemia, unspecified hyperlipidemia type    3. Chronic pain syndrome  -     Compliance Drug Analysis, Ur - Urine, Clean Catch    4. Supplemental oxygen dependent    5. Chronic obstructive pulmonary disease, unspecified COPD type    6. Encounter for medication monitoring  -     Compliance Drug Analysis, Ur - Urine, Clean Catch    Hypertension is currently well-controlled.  We discussed the cholesterol which she adamantly refuses any medications or treatment and states there is no reason to check.  Chronic pain syndrome will continue with her current medications and a compliance drug analysis was obtained.  Patient with COPD and supplemental oxygen dependent.  Continue to follow-up with her pulmonologist.  We discussed the lung cancer screening with lung low-dose CT but she has been getting this through her pulmonologist and her most recent was performed on 10/1/2024.    ROGER run and reviewed.  Risks of the medication include but are not limited to fatigue,  somnolence, increased risk of falls, constipation, allergic reaction, dependence, and addiction.       Dictated utilizing Dragon Dictation

## 2025-03-13 LAB — DRUGS UR: NORMAL

## 2025-03-20 DIAGNOSIS — M15.0 PRIMARY OSTEOARTHRITIS INVOLVING MULTIPLE JOINTS: ICD-10-CM

## 2025-03-20 DIAGNOSIS — G89.4 CHRONIC PAIN SYNDROME: ICD-10-CM

## 2025-03-21 RX ORDER — HYDROCODONE BITARTRATE AND ACETAMINOPHEN 7.5; 325 MG/1; MG/1
1 TABLET ORAL EVERY 8 HOURS PRN
Qty: 90 TABLET | Refills: 0 | Status: SHIPPED | OUTPATIENT
Start: 2025-03-21

## 2025-04-20 DIAGNOSIS — M15.0 PRIMARY OSTEOARTHRITIS INVOLVING MULTIPLE JOINTS: ICD-10-CM

## 2025-04-20 DIAGNOSIS — G89.4 CHRONIC PAIN SYNDROME: ICD-10-CM

## 2025-04-21 RX ORDER — HYDROCODONE BITARTRATE AND ACETAMINOPHEN 7.5; 325 MG/1; MG/1
1 TABLET ORAL EVERY 8 HOURS PRN
Qty: 90 TABLET | Refills: 0 | Status: SHIPPED | OUTPATIENT
Start: 2025-04-21

## 2025-04-22 ENCOUNTER — PRIOR AUTHORIZATION (OUTPATIENT)
Dept: FAMILY MEDICINE CLINIC | Facility: CLINIC | Age: 70
End: 2025-04-22
Payer: MEDICARE

## 2025-04-22 NOTE — TELEPHONE ENCOUNTER
PA started 4/22/25    Key [id number]    Dx: G89.4 & M15.0    PA Case: [id number], Status: Approved, Coverage Starts on: 1/1/2025 12:00:00 AM, Coverage Ends on: 12/31/2025 12:00:00 AM. Questions? Contact [phone].

## 2025-05-19 DIAGNOSIS — G89.4 CHRONIC PAIN SYNDROME: ICD-10-CM

## 2025-05-19 DIAGNOSIS — M15.0 PRIMARY OSTEOARTHRITIS INVOLVING MULTIPLE JOINTS: ICD-10-CM

## 2025-05-20 RX ORDER — HYDROCODONE BITARTRATE AND ACETAMINOPHEN 7.5; 325 MG/1; MG/1
1 TABLET ORAL EVERY 8 HOURS PRN
Qty: 90 TABLET | Refills: 0 | Status: SHIPPED | OUTPATIENT
Start: 2025-05-20

## 2025-06-09 ENCOUNTER — OFFICE VISIT (OUTPATIENT)
Dept: FAMILY MEDICINE CLINIC | Facility: CLINIC | Age: 70
End: 2025-06-09
Payer: MEDICARE

## 2025-06-09 VITALS
TEMPERATURE: 98.7 F | HEART RATE: 90 BPM | DIASTOLIC BLOOD PRESSURE: 82 MMHG | OXYGEN SATURATION: 100 % | BODY MASS INDEX: 25.78 KG/M2 | SYSTOLIC BLOOD PRESSURE: 134 MMHG | WEIGHT: 160.4 LBS | HEIGHT: 66 IN

## 2025-06-09 DIAGNOSIS — J44.9 CHRONIC OBSTRUCTIVE PULMONARY DISEASE, UNSPECIFIED COPD TYPE: ICD-10-CM

## 2025-06-09 DIAGNOSIS — R35.0 URINARY FREQUENCY: Primary | ICD-10-CM

## 2025-06-09 DIAGNOSIS — I10 PRIMARY HYPERTENSION: ICD-10-CM

## 2025-06-09 DIAGNOSIS — Z99.81 SUPPLEMENTAL OXYGEN DEPENDENT: ICD-10-CM

## 2025-06-09 DIAGNOSIS — N39.0 URINARY TRACT INFECTION WITHOUT HEMATURIA, SITE UNSPECIFIED: ICD-10-CM

## 2025-06-09 LAB
BILIRUB BLD-MCNC: NEGATIVE MG/DL
CLARITY, POC: CLEAR
COLOR UR: YELLOW
EXPIRATION DATE: ABNORMAL
GLUCOSE UR STRIP-MCNC: NEGATIVE MG/DL
KETONES UR QL: NEGATIVE
LEUKOCYTE EST, POC: NEGATIVE
Lab: ABNORMAL
NITRITE UR-MCNC: NEGATIVE MG/ML
PH UR: 7 [PH] (ref 5–8)
PROT UR STRIP-MCNC: NEGATIVE MG/DL
RBC # UR STRIP: ABNORMAL /UL
SP GR UR: 1 (ref 1–1.03)
UROBILINOGEN UR QL: ABNORMAL

## 2025-06-09 PROCEDURE — 81003 URINALYSIS AUTO W/O SCOPE: CPT | Performed by: INTERNAL MEDICINE

## 2025-06-09 PROCEDURE — 1126F AMNT PAIN NOTED NONE PRSNT: CPT | Performed by: INTERNAL MEDICINE

## 2025-06-09 PROCEDURE — 99214 OFFICE O/P EST MOD 30 MIN: CPT | Performed by: INTERNAL MEDICINE

## 2025-06-09 PROCEDURE — 3079F DIAST BP 80-89 MM HG: CPT | Performed by: INTERNAL MEDICINE

## 2025-06-09 PROCEDURE — 3075F SYST BP GE 130 - 139MM HG: CPT | Performed by: INTERNAL MEDICINE

## 2025-06-09 PROCEDURE — 1160F RVW MEDS BY RX/DR IN RCRD: CPT | Performed by: INTERNAL MEDICINE

## 2025-06-09 PROCEDURE — 1159F MED LIST DOCD IN RCRD: CPT | Performed by: INTERNAL MEDICINE

## 2025-06-09 RX ORDER — CEPHALEXIN 500 MG/1
500 CAPSULE ORAL 3 TIMES DAILY
Qty: 30 CAPSULE | Refills: 0 | Status: SHIPPED | OUTPATIENT
Start: 2025-06-09

## 2025-06-09 NOTE — PROGRESS NOTES
Subjective   Lula Decker is a 69 y.o. female.     Chief Complaint   Patient presents with    Hypertension    Urinary Frequency     X 1 mos       History of Present Illness   Having urinary leakage chronically, darker urine, frequency and urgency.  Urinates up to 2-3 times over night.  Seems to have been the last month.    History of hypertension.  Currently, has been feeling well and asymptomatic without any headaches, cough, chest pain, shortness of breath, swelling, focal neurologic deficit, memory loss or syncope.  Has been taking the medications regularly and adherent with the regimen of metoprolol tartrate 100 mg twice a day.  Denies medication side effects and no significant interval events.      History of high cholesterol.  Currently, has been feeling well without any myalgias, muscle aches, weakness, numbness, chest pain, short of breath or other issues.  Currently, is has not been taking any medications for the cholesterol for the last 18 months.  Patient is adamant will not take any medications for the cholesterol and last labs were done on 8/7/2023 , HDL 69, triglycerides 277, and total cholesterol 331 with normal CMP.     History of GERD and intermittent nausea lasting 5-10 minutes then self resolves.  This has been a chronic issue for a long time and unchanged.  She continues with the pantoprazole 40 mg daily.     Patient with COPD and on oxygen nasal canula and using the inhaled medications is stable and continues to see pulmonology.  Now on duo-neb, pulmicort and brovana.  Having progressive SOA and wheezing.  Using [place] oxygen up to 4L.  O2 sats range 78% with  with exertion with shower to 93% with  when taking nebulizer.     Continues to have chronic pain in the back, shoulders and knee.  Using the hydrocodone as needed and averages 2-3 times per day.  Denies side effects or issues with the problems or medications.  Having right elbow pain and swelling with septic bursitis and  cellulitis.  Seeing Dr Sekou burton.  Continued knee pain that intermittently flares and is severe in the right.  Has seen ortho and only options are injections or surgery but patient continues to refuse these options at this time.  Last script filled 5/21/25 for hydrocodone 7.5-325 #90 tabs.  Last urine compliance screen on 3/6/25.       The following portions of the patient's history were reviewed and updated as appropriate: allergies, current medications, past family history, past medical history, past social history, past surgical history and problem list.    Depression Screen:      12/5/2024     1:04 PM   PHQ-2/PHQ-9 Depression Screening   Little interest or pleasure in doing things Not at all   Feeling down, depressed, or hopeless Not at all       Past Medical History:   Diagnosis Date    Abnormal findings on diagnostic imaging of abdomen     Alopecia areata     Ankle sprain right ankle    Arthritis of neck cervical ribs    Asthma     Bleeding external hemorrhoids     Calf cramp     Carpal tunnel syndrome     Cervical disc disorder cervical ribs 3surgries    Chest pain     Chest wall pain     Constipation     COPD (chronic obstructive pulmonary disease)     COPD with acute exacerbation     Discoloration of skin of foot     Dyslipidemia     Dysuria     Eczema     Encounter for immunization     Esophageal reflux     Fatty liver     Fibromyalgia, primary     GERD (gastroesophageal reflux disease)     Headache     Hives of unknown origin     Hyperactivity of bladder     Hypertension     IBS (irritable bowel syndrome)     Incontinence of urine     Insomnia     Menopausal symptom     Migraine     MRSA exposure     Muscle spasm     Nocturia     OA (osteoarthritis)     Obstructive chronic bronchitis with acute exacerbation     Oral thrush     Osteoarthritis of left knee     Osteoporosis     PAD (peripheral artery disease)     Periarthritis of shoulder     Sinusitis, acute     Synovial cyst of popliteal space      Thoracic disc disorder     Tremor     Tubular adenoma of colon     Urinary frequency     Urinary urgency     Urinary, incontinence, stress female     UTI (urinary tract infection)        Past Surgical History:   Procedure Laterality Date    BREAST LUMPECTOMY      BREAST SURGERY      COLONOSCOPY      EYE SURGERY  catract surgery    HAND SURGERY  carpel tunnel    HYSTERECTOMY      Not due to cancer    INCONTINENCE SURGERY      NECK SURGERY      NEUROPLASTY      Median nerve at carpal tunnel.    OOPHORECTOMY      OTHER SURGICAL HISTORY  08/20/2014    Esophadogastroduodenoscopy - Gastritis     TONSILLECTOMY      TUBAL ABDOMINAL LIGATION      WRIST SURGERY         Family History   Problem Relation Age of Onset    Heart failure Father     Heart disease Father        Social History     Socioeconomic History    Marital status:    Tobacco Use    Smoking status: Every Day     Current packs/day: 0.50     Average packs/day: 0.5 packs/day for 50.0 years (25.0 ttl pk-yrs)     Types: Cigarettes    Smokeless tobacco: Never   Vaping Use    Vaping status: Never Used   Substance and Sexual Activity    Alcohol use: Yes     Comment: occasional beer with pizza    Drug use: No    Sexual activity: Defer       Current Outpatient Medications   Medication Sig Dispense Refill    albuterol sulfate  (90 Base) MCG/ACT inhaler Inhale 2 puffs Every 4 (Four) Hours As Needed for Wheezing. 8.5 g 6    amitriptyline (ELAVIL) 100 MG tablet Take 1 tablet by mouth every night at bedtime. 90 tablet 1    arformoterol (BROVANA) 15 MCG/2ML nebulizer solution Take  by nebulization 2 (Two) Times a Day.      budesonide (PULMICORT) 0.5 MG/2ML nebulizer solution Take 2 mL by nebulization Daily.      CRANBERRY PO Take 1 tablet by mouth Daily.      doxylamine (UNISOM) 25 MG tablet Take 1 tablet by mouth At Night As Needed.      HYDROcodone-acetaminophen (NORCO) 7.5-325 MG per tablet Take 1 tablet by mouth Every 8 (Eight) Hours As Needed for Moderate  Pain. 90 tablet 0    ipratropium-albuterol (DUO-NEB) 0.5-2.5 mg/3 ml nebulizer Take 3 mL by nebulization 4 (Four) Times a Day. 360 mL 3    metoprolol tartrate (LOPRESSOR) 100 MG tablet Take 1 tablet by mouth 2 (Two) Times a Day. 180 tablet 3    pantoprazole (PROTONIX) 40 MG EC tablet Take 1 tablet by mouth Daily. 90 tablet 1    triamcinolone (KENALOG) 0.1 % cream Apply  topically to the appropriate area as directed 2 (Two) Times a Day. (Patient taking differently: Apply  topically to the appropriate area as directed 2 (Two) Times a Day As Needed.) 45 g 0    vitamin B-12 (CYANOCOBALAMIN) 1000 MCG tablet Take 1 tablet by mouth Daily.      cephalexin (KEFLEX) 500 MG capsule Take 1 capsule by mouth 3 (Three) Times a Day. 30 capsule 0     No current facility-administered medications for this visit.       Review of Systems   Constitutional:  Positive for fatigue. Negative for activity change, appetite change, fever, unexpected weight gain and unexpected weight loss.   HENT:  Positive for congestion. Negative for nosebleeds, rhinorrhea, trouble swallowing and voice change.    Eyes:  Negative for visual disturbance.   Respiratory:  Positive for shortness of breath. Negative for cough, chest tightness and wheezing.    Cardiovascular:  Negative for chest pain, palpitations and leg swelling.   Gastrointestinal:  Negative for abdominal pain, blood in stool, constipation, diarrhea, nausea, vomiting, GERD and indigestion.   Endocrine: Positive for polyuria.   Genitourinary:  Positive for frequency and urgency. Negative for dysuria and hematuria.   Musculoskeletal:  Negative for arthralgias, back pain and myalgias.   Skin:  Negative for rash and wound.   Neurological:  Positive for weakness and numbness. Negative for dizziness, tremors, light-headedness, headache and memory problem.   Hematological:  Negative for adenopathy. Does not bruise/bleed easily.   Psychiatric/Behavioral:  Negative for sleep disturbance and depressed mood.  "The patient is not nervous/anxious.        Objective   /82 (BP Location: Left arm, Patient Position: Sitting, Cuff Size: Adult)   Pulse 90   Temp 98.7 °F (37.1 °C) (Oral)   Ht 167.6 cm (66\")   Wt 72.8 kg (160 lb 6.4 oz)   SpO2 100%   BMI 25.89 kg/m²     Physical Exam  Vitals and nursing note reviewed.   Constitutional:       General: She is not in acute distress.     Appearance: She is well-developed. She is not diaphoretic.   HENT:      Head: Normocephalic and atraumatic.      Right Ear: External ear normal.      Left Ear: External ear normal.      Nose: Nose normal.   Eyes:      Conjunctiva/sclera: Conjunctivae normal.      Pupils: Pupils are equal, round, and reactive to light.   Neck:      Thyroid: No thyromegaly.      Trachea: No tracheal deviation.   Cardiovascular:      Rate and Rhythm: Normal rate and regular rhythm.      Heart sounds: Normal heart sounds. No murmur heard.     No friction rub. No gallop.   Pulmonary:      Effort: Pulmonary effort is normal. No respiratory distress.      Breath sounds: Wheezing present.      Comments: On 2-4 L per nasal canula oxygen  Abdominal:      General: Bowel sounds are normal.      Palpations: Abdomen is soft. There is no mass.      Tenderness: There is no abdominal tenderness. There is no guarding.   Musculoskeletal:         General: Normal range of motion.      Cervical back: Normal range of motion and neck supple.   Lymphadenopathy:      Cervical: No cervical adenopathy.   Skin:     General: Skin is warm and dry.      Capillary Refill: Capillary refill takes less than 2 seconds.      Findings: No rash.   Neurological:      Mental Status: She is alert and oriented to person, place, and time.      Motor: No abnormal muscle tone.      Deep Tendon Reflexes: Reflexes normal.   Psychiatric:         Behavior: Behavior normal.         Thought Content: Thought content normal.         Judgment: Judgment normal.     No results found for this or any previous visit " (from the past 12 weeks).  Assessment & Plan   Diagnoses and all orders for this visit:    1. Urinary tract infection without hematuria, site unspecified (Primary)  -     cephalexin (KEFLEX) 500 MG capsule; Take 1 capsule by mouth 3 (Three) Times a Day.  Dispense: 30 capsule; Refill: 0  -     Urine Culture - Urine, Urine, Clean Catch    2. Primary hypertension    3. Chronic obstructive pulmonary disease, unspecified COPD type    4. Supplemental oxygen dependent    Hypertension controlled.  Continue the current medications.  Hematuria and symptoms of UTI with frequency and urgency.  Will treat for UTI and await the culture.           Dictated utilizing Dragon Dictation

## 2025-06-11 LAB
BACTERIA UR CULT: NORMAL
BACTERIA UR CULT: NORMAL

## 2025-06-17 DIAGNOSIS — M15.0 PRIMARY OSTEOARTHRITIS INVOLVING MULTIPLE JOINTS: ICD-10-CM

## 2025-06-17 DIAGNOSIS — G89.4 CHRONIC PAIN SYNDROME: ICD-10-CM

## 2025-06-19 RX ORDER — HYDROCODONE BITARTRATE AND ACETAMINOPHEN 7.5; 325 MG/1; MG/1
1 TABLET ORAL EVERY 8 HOURS PRN
Qty: 90 TABLET | Refills: 0 | Status: SHIPPED | OUTPATIENT
Start: 2025-06-19

## 2025-07-20 DIAGNOSIS — G89.4 CHRONIC PAIN SYNDROME: ICD-10-CM

## 2025-07-20 DIAGNOSIS — M15.0 PRIMARY OSTEOARTHRITIS INVOLVING MULTIPLE JOINTS: ICD-10-CM

## 2025-07-20 DIAGNOSIS — F51.01 PRIMARY INSOMNIA: ICD-10-CM

## 2025-07-21 RX ORDER — HYDROCODONE BITARTRATE AND ACETAMINOPHEN 7.5; 325 MG/1; MG/1
1 TABLET ORAL EVERY 8 HOURS PRN
Qty: 90 TABLET | Refills: 0 | Status: SHIPPED | OUTPATIENT
Start: 2025-07-21

## 2025-07-21 RX ORDER — AMITRIPTYLINE HYDROCHLORIDE 100 MG/1
100 TABLET ORAL
Qty: 90 TABLET | Refills: 1 | Status: SHIPPED | OUTPATIENT
Start: 2025-07-21

## 2025-07-31 DIAGNOSIS — K21.9 GASTROESOPHAGEAL REFLUX DISEASE WITHOUT ESOPHAGITIS: ICD-10-CM

## 2025-07-31 RX ORDER — PANTOPRAZOLE SODIUM 40 MG/1
40 TABLET, DELAYED RELEASE ORAL DAILY
Qty: 90 TABLET | Refills: 1 | Status: SHIPPED | OUTPATIENT
Start: 2025-07-31

## 2025-07-31 NOTE — TELEPHONE ENCOUNTER
LOV                   6/9/2025  NOV                   9/22/2025  Last refill       1/31/25        Protocol        not met

## 2025-08-18 DIAGNOSIS — M15.0 PRIMARY OSTEOARTHRITIS INVOLVING MULTIPLE JOINTS: ICD-10-CM

## 2025-08-18 DIAGNOSIS — G89.4 CHRONIC PAIN SYNDROME: ICD-10-CM

## 2025-08-18 RX ORDER — HYDROCODONE BITARTRATE AND ACETAMINOPHEN 7.5; 325 MG/1; MG/1
1 TABLET ORAL EVERY 8 HOURS PRN
Qty: 90 TABLET | Refills: 0 | Status: SHIPPED | OUTPATIENT
Start: 2025-08-18